# Patient Record
Sex: MALE | Race: WHITE | Employment: OTHER | URBAN - METROPOLITAN AREA
[De-identification: names, ages, dates, MRNs, and addresses within clinical notes are randomized per-mention and may not be internally consistent; named-entity substitution may affect disease eponyms.]

---

## 2017-02-01 ASSESSMENT — MIFFLIN-ST. JEOR: SCORE: 1941.06

## 2017-02-06 ENCOUNTER — SURGERY - HEALTHEAST (OUTPATIENT)
Dept: SURGERY | Facility: CLINIC | Age: 67
End: 2017-02-06

## 2017-02-06 ENCOUNTER — ANESTHESIA - HEALTHEAST (OUTPATIENT)
Dept: SURGERY | Facility: CLINIC | Age: 67
End: 2017-02-06

## 2017-02-06 ASSESSMENT — MIFFLIN-ST. JEOR: SCORE: 1984.15

## 2019-11-16 ENCOUNTER — HOSPITAL ENCOUNTER (EMERGENCY)
Facility: CLINIC | Age: 69
Discharge: HOME | End: 2019-11-16
Attending: FAMILY MEDICINE | Admitting: FAMILY MEDICINE
Payer: MEDICARE

## 2019-11-16 ENCOUNTER — APPOINTMENT (OUTPATIENT)
Dept: GENERAL RADIOLOGY | Facility: CLINIC | Age: 69
End: 2019-11-16
Attending: FAMILY MEDICINE
Payer: MEDICARE

## 2019-11-16 ENCOUNTER — APPOINTMENT (OUTPATIENT)
Dept: CT IMAGING | Facility: CLINIC | Age: 69
End: 2019-11-16
Attending: FAMILY MEDICINE
Payer: MEDICARE

## 2019-11-16 VITALS
RESPIRATION RATE: 24 BRPM | SYSTOLIC BLOOD PRESSURE: 160 MMHG | BODY MASS INDEX: 34.52 KG/M2 | DIASTOLIC BLOOD PRESSURE: 93 MMHG | HEIGHT: 74 IN | TEMPERATURE: 97.9 F | HEART RATE: 77 BPM | WEIGHT: 269 LBS | OXYGEN SATURATION: 92 %

## 2019-11-16 DIAGNOSIS — R06.09 DYSPNEA ON EXERTION: ICD-10-CM

## 2019-11-16 DIAGNOSIS — R79.89 ELEVATED BRAIN NATRIURETIC PEPTIDE (BNP) LEVEL: ICD-10-CM

## 2019-11-16 DIAGNOSIS — R93.89 ABNORMAL CT OF THE CHEST: ICD-10-CM

## 2019-11-16 PROBLEM — M54.16 LUMBAR RADICULOPATHY: Status: ACTIVE | Noted: 2017-02-06

## 2019-11-16 PROBLEM — Z98.890 S/P SPINAL SURGERY: Status: ACTIVE | Noted: 2019-11-16

## 2019-11-16 LAB
ALBUMIN SERPL-MCNC: 3.2 G/DL (ref 3.4–5)
ALP SERPL-CCNC: 122 U/L (ref 40–150)
ALT SERPL W P-5'-P-CCNC: 32 U/L (ref 0–70)
ANION GAP SERPL CALCULATED.3IONS-SCNC: 2 MMOL/L (ref 3–14)
AST SERPL W P-5'-P-CCNC: 22 U/L (ref 0–45)
BASOPHILS # BLD AUTO: 0 10E9/L (ref 0–0.2)
BASOPHILS NFR BLD AUTO: 0.3 %
BILIRUB DIRECT SERPL-MCNC: 0.2 MG/DL (ref 0–0.2)
BILIRUB SERPL-MCNC: 0.7 MG/DL (ref 0.2–1.3)
BUN SERPL-MCNC: 15 MG/DL (ref 7–30)
CALCIUM SERPL-MCNC: 8.3 MG/DL (ref 8.5–10.1)
CHLORIDE SERPL-SCNC: 108 MMOL/L (ref 94–109)
CO2 SERPL-SCNC: 32 MMOL/L (ref 20–32)
CREAT SERPL-MCNC: 0.8 MG/DL (ref 0.66–1.25)
D DIMER PPP FEU-MCNC: 0.6 UG/ML FEU (ref 0–0.5)
DIFFERENTIAL METHOD BLD: NORMAL
EOSINOPHIL # BLD AUTO: 0.1 10E9/L (ref 0–0.7)
EOSINOPHIL NFR BLD AUTO: 2.3 %
ERYTHROCYTE [DISTWIDTH] IN BLOOD BY AUTOMATED COUNT: 12.9 % (ref 10–15)
GFR SERPL CREATININE-BSD FRML MDRD: >90 ML/MIN/{1.73_M2}
GLUCOSE SERPL-MCNC: 132 MG/DL (ref 70–99)
HCT VFR BLD AUTO: 43.8 % (ref 40–53)
HGB BLD-MCNC: 14.2 G/DL (ref 13.3–17.7)
IMM GRANULOCYTES # BLD: 0 10E9/L (ref 0–0.4)
IMM GRANULOCYTES NFR BLD: 0.3 %
LYMPHOCYTES # BLD AUTO: 1.3 10E9/L (ref 0.8–5.3)
LYMPHOCYTES NFR BLD AUTO: 22.6 %
MCH RBC QN AUTO: 29.8 PG (ref 26.5–33)
MCHC RBC AUTO-ENTMCNC: 32.4 G/DL (ref 31.5–36.5)
MCV RBC AUTO: 92 FL (ref 78–100)
MONOCYTES # BLD AUTO: 0.4 10E9/L (ref 0–1.3)
MONOCYTES NFR BLD AUTO: 7 %
NEUTROPHILS # BLD AUTO: 3.9 10E9/L (ref 1.6–8.3)
NEUTROPHILS NFR BLD AUTO: 67.5 %
NRBC # BLD AUTO: 0 10*3/UL
NRBC BLD AUTO-RTO: 0 /100
NT-PROBNP SERPL-MCNC: 1490 PG/ML (ref 0–900)
PLATELET # BLD AUTO: 203 10E9/L (ref 150–450)
POTASSIUM SERPL-SCNC: 3.7 MMOL/L (ref 3.4–5.3)
PROT SERPL-MCNC: 6.5 G/DL (ref 6.8–8.8)
RBC # BLD AUTO: 4.76 10E12/L (ref 4.4–5.9)
SODIUM SERPL-SCNC: 142 MMOL/L (ref 133–144)
TROPONIN I SERPL-MCNC: 0.04 UG/L (ref 0–0.04)
TSH SERPL DL<=0.005 MIU/L-ACNC: 1.7 MU/L (ref 0.4–4)
WBC # BLD AUTO: 5.8 10E9/L (ref 4–11)

## 2019-11-16 PROCEDURE — 76604 US EXAM CHEST: CPT

## 2019-11-16 PROCEDURE — 71046 X-RAY EXAM CHEST 2 VIEWS: CPT

## 2019-11-16 PROCEDURE — 93005 ELECTROCARDIOGRAM TRACING: CPT

## 2019-11-16 PROCEDURE — 83880 ASSAY OF NATRIURETIC PEPTIDE: CPT | Performed by: FAMILY MEDICINE

## 2019-11-16 PROCEDURE — 85379 FIBRIN DEGRADATION QUANT: CPT | Performed by: FAMILY MEDICINE

## 2019-11-16 PROCEDURE — 99285 EMERGENCY DEPT VISIT HI MDM: CPT | Mod: 25

## 2019-11-16 PROCEDURE — 25000125 ZZHC RX 250: Performed by: FAMILY MEDICINE

## 2019-11-16 PROCEDURE — 80048 BASIC METABOLIC PNL TOTAL CA: CPT | Performed by: FAMILY MEDICINE

## 2019-11-16 PROCEDURE — 84443 ASSAY THYROID STIM HORMONE: CPT | Performed by: FAMILY MEDICINE

## 2019-11-16 PROCEDURE — 93010 ELECTROCARDIOGRAM REPORT: CPT | Mod: Z6 | Performed by: FAMILY MEDICINE

## 2019-11-16 PROCEDURE — 71275 CT ANGIOGRAPHY CHEST: CPT

## 2019-11-16 PROCEDURE — 25000128 H RX IP 250 OP 636: Performed by: FAMILY MEDICINE

## 2019-11-16 PROCEDURE — 99285 EMERGENCY DEPT VISIT HI MDM: CPT | Mod: 25 | Performed by: FAMILY MEDICINE

## 2019-11-16 PROCEDURE — 80076 HEPATIC FUNCTION PANEL: CPT | Performed by: FAMILY MEDICINE

## 2019-11-16 PROCEDURE — 84484 ASSAY OF TROPONIN QUANT: CPT | Performed by: FAMILY MEDICINE

## 2019-11-16 PROCEDURE — 85025 COMPLETE CBC W/AUTO DIFF WBC: CPT | Performed by: FAMILY MEDICINE

## 2019-11-16 RX ORDER — LOSARTAN POTASSIUM 50 MG/1
50 TABLET ORAL AT BEDTIME
COMMUNITY
End: 2023-07-21

## 2019-11-16 RX ORDER — ALCOHOL 70.47 ML/100ML
1 GEL TOPICAL DAILY
COMMUNITY
End: 2023-07-21

## 2019-11-16 RX ORDER — POTASSIUM CHLORIDE 750 MG/1
10 TABLET, EXTENDED RELEASE ORAL DAILY
Qty: 14 TABLET | Refills: 0 | Status: SHIPPED | OUTPATIENT
Start: 2019-11-16 | End: 2021-05-29

## 2019-11-16 RX ORDER — INSULIN GLARGINE 100 [IU]/ML
36 INJECTION, SOLUTION SUBCUTANEOUS 2 TIMES DAILY
COMMUNITY

## 2019-11-16 RX ORDER — IOPAMIDOL 755 MG/ML
100 INJECTION, SOLUTION INTRAVASCULAR ONCE
Status: COMPLETED | OUTPATIENT
Start: 2019-11-16 | End: 2019-11-16

## 2019-11-16 RX ORDER — HYDROCHLOROTHIAZIDE 25 MG/1
25 TABLET ORAL DAILY
COMMUNITY
End: 2021-09-16

## 2019-11-16 RX ORDER — FUROSEMIDE 20 MG
20 TABLET ORAL DAILY
Qty: 14 TABLET | Refills: 0 | Status: SHIPPED | OUTPATIENT
Start: 2019-11-16 | End: 2021-06-03

## 2019-11-16 RX ADMIN — SODIUM CHLORIDE 100 ML: 9 INJECTION, SOLUTION INTRAVENOUS at 12:10

## 2019-11-16 RX ADMIN — IOPAMIDOL 100 ML: 755 INJECTION, SOLUTION INTRAVENOUS at 12:10

## 2019-11-16 ASSESSMENT — ENCOUNTER SYMPTOMS
COUGH: 0
CHILLS: 0
DYSURIA: 0
PALPITATIONS: 0
DIARRHEA: 0
BLOOD IN STOOL: 0
DIAPHORESIS: 0
CONSTIPATION: 0
WHEEZING: 0
ABDOMINAL PAIN: 0
HEADACHES: 0
VOMITING: 0
NAUSEA: 0
UNEXPECTED WEIGHT CHANGE: 1
SORE THROAT: 0
FEVER: 0
SHORTNESS OF BREATH: 1
SINUS PRESSURE: 0
FREQUENCY: 0

## 2019-11-16 ASSESSMENT — MIFFLIN-ST. JEOR: SCORE: 2059.93
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
## 2019-11-16 NOTE — ED AVS SNAPSHOT
[facility]  [address] [address]  Phone:  [phone]  Fax:  [phone]                                    Reilly Borja   MRN: [id number]    Department:  [facility]   Date of Visit:  11/16/2019           After Visit Summary Signature Page    I have received my discharge instructions, and my questions have been answered. I have discussed any challenges I see with this plan with the nurse or doctor.    ..........................................................................................................................................  Patient/Patient Representative Signature      ..........................................................................................................................................  Patient Representative Print Name and Relationship to Patient    ..................................................               ................................................  Date                                   Time    ..........................................................................................................................................  Reviewed by Signature/Title    ...................................................              ..............................................  Date                                               Time          22EPIC Rev 08/18

## 2021-05-25 ENCOUNTER — RECORDS - HEALTHEAST (OUTPATIENT)
Dept: ADMINISTRATIVE | Facility: CLINIC | Age: 71
End: 2021-05-25

## 2021-05-28 ENCOUNTER — HOSPITAL ENCOUNTER (EMERGENCY)
Facility: CLINIC | Age: 71
Discharge: HOME | End: 2021-05-29
Attending: EMERGENCY MEDICINE | Admitting: EMERGENCY MEDICINE
Payer: MEDICARE

## 2021-05-28 ENCOUNTER — COMMUNICATION - HEALTHEAST (OUTPATIENT)
Dept: SCHEDULING | Facility: CLINIC | Age: 71
End: 2021-05-28

## 2021-05-28 DIAGNOSIS — I50.9 ACUTE ON CHRONIC CONGESTIVE HEART FAILURE, UNSPECIFIED HEART FAILURE TYPE (H): ICD-10-CM

## 2021-05-28 LAB
BASOPHILS # BLD AUTO: 0 10E9/L (ref 0–0.2)
BASOPHILS NFR BLD AUTO: 0.3 %
DIFFERENTIAL METHOD BLD: ABNORMAL
EOSINOPHIL # BLD AUTO: 0.2 10E9/L (ref 0–0.7)
EOSINOPHIL NFR BLD AUTO: 2.5 %
ERYTHROCYTE [DISTWIDTH] IN BLOOD BY AUTOMATED COUNT: 13.2 % (ref 10–15)
HCT VFR BLD AUTO: 35.7 % (ref 40–53)
HGB BLD-MCNC: 11.5 G/DL (ref 13.3–17.7)
IMM GRANULOCYTES # BLD: 0.1 10E9/L (ref 0–0.4)
IMM GRANULOCYTES NFR BLD: 1 %
LYMPHOCYTES # BLD AUTO: 1.5 10E9/L (ref 0.8–5.3)
LYMPHOCYTES NFR BLD AUTO: 20.4 %
MCH RBC QN AUTO: 29 PG (ref 26.5–33)
MCHC RBC AUTO-ENTMCNC: 32.2 G/DL (ref 31.5–36.5)
MCV RBC AUTO: 90 FL (ref 78–100)
MONOCYTES # BLD AUTO: 0.6 10E9/L (ref 0–1.3)
MONOCYTES NFR BLD AUTO: 7.9 %
NEUTROPHILS # BLD AUTO: 4.9 10E9/L (ref 1.6–8.3)
NEUTROPHILS NFR BLD AUTO: 67.9 %
NRBC # BLD AUTO: 0 10*3/UL
NRBC BLD AUTO-RTO: 0 /100
PLATELET # BLD AUTO: 360 10E9/L (ref 150–450)
RBC # BLD AUTO: 3.96 10E12/L (ref 4.4–5.9)
WBC # BLD AUTO: 7.2 10E9/L (ref 4–11)

## 2021-05-28 PROCEDURE — 96374 THER/PROPH/DIAG INJ IV PUSH: CPT | Mod: 59 | Performed by: EMERGENCY MEDICINE

## 2021-05-28 PROCEDURE — 84484 ASSAY OF TROPONIN QUANT: CPT | Performed by: EMERGENCY MEDICINE

## 2021-05-28 PROCEDURE — 83880 ASSAY OF NATRIURETIC PEPTIDE: CPT | Performed by: EMERGENCY MEDICINE

## 2021-05-28 PROCEDURE — 80053 COMPREHEN METABOLIC PANEL: CPT | Performed by: EMERGENCY MEDICINE

## 2021-05-28 PROCEDURE — 99285 EMERGENCY DEPT VISIT HI MDM: CPT | Mod: 25 | Performed by: EMERGENCY MEDICINE

## 2021-05-28 PROCEDURE — 93010 ELECTROCARDIOGRAM REPORT: CPT | Performed by: EMERGENCY MEDICINE

## 2021-05-28 PROCEDURE — 85025 COMPLETE CBC W/AUTO DIFF WBC: CPT | Performed by: EMERGENCY MEDICINE

## 2021-05-28 PROCEDURE — 93005 ELECTROCARDIOGRAM TRACING: CPT | Performed by: EMERGENCY MEDICINE

## 2021-05-28 PROCEDURE — 85379 FIBRIN DEGRADATION QUANT: CPT | Performed by: EMERGENCY MEDICINE

## 2021-05-28 RX ORDER — FUROSEMIDE 10 MG/ML
80 INJECTION INTRAMUSCULAR; INTRAVENOUS ONCE
Status: COMPLETED | OUTPATIENT
Start: 2021-05-28 | End: 2021-05-29

## 2021-05-28 ASSESSMENT — MIFFLIN-ST. JEOR: SCORE: 2031.78

## 2021-05-29 ENCOUNTER — APPOINTMENT (OUTPATIENT)
Dept: CT IMAGING | Facility: CLINIC | Age: 71
End: 2021-05-29
Attending: EMERGENCY MEDICINE
Payer: MEDICARE

## 2021-05-29 VITALS
RESPIRATION RATE: 18 BRPM | BODY MASS INDEX: 34.01 KG/M2 | HEART RATE: 99 BPM | SYSTOLIC BLOOD PRESSURE: 166 MMHG | WEIGHT: 265 LBS | OXYGEN SATURATION: 97 % | TEMPERATURE: 98.6 F | DIASTOLIC BLOOD PRESSURE: 89 MMHG | HEIGHT: 74 IN

## 2021-05-29 LAB
ALBUMIN SERPL-MCNC: 2.8 G/DL (ref 3.4–5)
ALP SERPL-CCNC: 562 U/L (ref 40–150)
ALT SERPL W P-5'-P-CCNC: 33 U/L (ref 0–70)
ANION GAP SERPL CALCULATED.3IONS-SCNC: 4 MMOL/L (ref 3–14)
AST SERPL W P-5'-P-CCNC: 21 U/L (ref 0–45)
BILIRUB SERPL-MCNC: 0.6 MG/DL (ref 0.2–1.3)
BUN SERPL-MCNC: 20 MG/DL (ref 7–30)
CALCIUM SERPL-MCNC: 8.7 MG/DL (ref 8.5–10.1)
CHLORIDE SERPL-SCNC: 100 MMOL/L (ref 94–109)
CO2 SERPL-SCNC: 32 MMOL/L (ref 20–32)
CREAT SERPL-MCNC: 0.86 MG/DL (ref 0.66–1.25)
D DIMER PPP FEU-MCNC: 3.3 UG/ML FEU (ref 0–0.5)
GFR SERPL CREATININE-BSD FRML MDRD: 88 ML/MIN/{1.73_M2}
GLUCOSE SERPL-MCNC: 238 MG/DL (ref 70–99)
NT-PROBNP SERPL-MCNC: 5762 PG/ML (ref 0–900)
POTASSIUM SERPL-SCNC: 3.8 MMOL/L (ref 3.4–5.3)
PROT SERPL-MCNC: 7.8 G/DL (ref 6.8–8.8)
SODIUM SERPL-SCNC: 136 MMOL/L (ref 133–144)
TROPONIN I SERPL-MCNC: 0.02 UG/L (ref 0–0.04)

## 2021-05-29 PROCEDURE — 71275 CT ANGIOGRAPHY CHEST: CPT | Mod: ME

## 2021-05-29 PROCEDURE — 250N000009 HC RX 250: Performed by: EMERGENCY MEDICINE

## 2021-05-29 PROCEDURE — 250N000011 HC RX IP 250 OP 636: Performed by: EMERGENCY MEDICINE

## 2021-05-29 RX ORDER — IOPAMIDOL 755 MG/ML
93 INJECTION, SOLUTION INTRAVASCULAR ONCE
Status: COMPLETED | OUTPATIENT
Start: 2021-05-29 | End: 2021-05-29

## 2021-05-29 RX ORDER — POTASSIUM CHLORIDE 750 MG/1
10 TABLET, EXTENDED RELEASE ORAL 2 TIMES DAILY
Qty: 14 TABLET | Refills: 0 | Status: SHIPPED | OUTPATIENT
Start: 2021-05-29 | End: 2021-09-16

## 2021-05-29 RX ADMIN — IOPAMIDOL 93 ML: 755 INJECTION, SOLUTION INTRAVENOUS at 01:31

## 2021-05-29 RX ADMIN — FUROSEMIDE 80 MG: 10 INJECTION, SOLUTION INTRAMUSCULAR; INTRAVENOUS at 01:48

## 2021-05-29 RX ADMIN — SODIUM CHLORIDE 100 ML: 9 INJECTION, SOLUTION INTRAVENOUS at 01:31

## 2021-05-29 ASSESSMENT — ENCOUNTER SYMPTOMS
VOMITING: 0
RHINORRHEA: 0
DYSPHORIC MOOD: 0
FATIGUE: 0
PALPITATIONS: 0
APPETITE CHANGE: 0
FEVER: 0
CHEST TIGHTNESS: 0
ABDOMINAL PAIN: 0
WEAKNESS: 0
LIGHT-HEADEDNESS: 0
NUMBNESS: 0
ACTIVITY CHANGE: 1
SHORTNESS OF BREATH: 1
BACK PAIN: 0
NAUSEA: 0
HEADACHES: 0
COUGH: 1

## 2021-05-29 NOTE — ED TRIAGE NOTES
Has hx of CHF. Last 4 days unable to lay down d/t unable to catch his breath. Having trouble now sleep upright in a chair. States he thinks he gained about 15 lbs of water weight. o2 sat goes down to 91% at night.

## 2021-05-29 NOTE — ED PROVIDER NOTES
History     Chief Complaint   Patient presents with     Shortness of Breath     HPI  Reilly Borja is a 71 year old male with a history of CHF, hypertension, and diabetes presenting for evaluation of gradually progressive dyspnea.  Patient ports worsening dyspnea especially at night over the past 4 to 5 days.  He does report difficulty with exertion as well but mostly notices it when trying to sleep.  He reports often waking up with difficulty breathing leading to poor sleep over the past 4 days.  He reports that he has noticed increased weight of about 15 pounds over the past week or so.  Has measured his oxygen levels dropping down to around 90% when sleeping and normally runs around 95% when sleeping.  Denies any chest pains.  Reports an occasional cough.  No fevers or chills.  Denies lightheadedness or dizziness.  Does report some increased swelling in his legs.  Had recent surgery on his left foot and remains in a boot and has had limited mobility for the past several weeks due to his surgery.  Has had bilateral lower leg swelling more than the left where his surgery occurred.  No history of blood clots.    Allergies:  No Known Allergies    Problem List:    Patient Active Problem List    Diagnosis Date Noted     Essential hypertension 11/16/2019     Priority: Medium     Type 2 diabetes mellitus with complication, with long-term current use of insulin (H) 11/16/2019     Priority: Medium     S/P spinal surgery 11/16/2019     Priority: Medium     Lumbar radiculopathy 02/06/2017     Priority: Medium        Past Medical History:    No past medical history on file.    Past Surgical History:    No past surgical history on file.    Family History:    No family history on file.    Social History:  Marital Status:  Single [1]  Social History     Tobacco Use     Smoking status: Not on file   Substance Use Topics     Alcohol use: Not on file     Drug use: Not on file        Medications:    potassium chloride ER  "(KLOR-CON M) 10 MEQ CR tablet  furosemide (LASIX) 20 MG tablet  hydrochlorothiazide (HYDRODIURIL) 25 MG tablet  insulin glargine (LANTUS VIAL) 100 UNIT/ML vial  losartan (COZAAR) 50 MG tablet  metFORMIN (GLUCOPHAGE) 1000 MG tablet  multivitamin (THERMEMS) TABS          Review of Systems   Constitutional: Positive for activity change (decreased due to left foot surgery about 10 wk ago). Negative for appetite change, fatigue and fever.   HENT: Negative for congestion and rhinorrhea.    Respiratory: Positive for cough (occasional) and shortness of breath. Negative for chest tightness.    Cardiovascular: Positive for leg swelling. Negative for chest pain and palpitations.   Gastrointestinal: Negative for abdominal pain, nausea and vomiting.   Genitourinary: Negative for decreased urine volume.   Musculoskeletal: Negative for back pain.   Skin: Negative for rash.   Neurological: Negative for weakness, light-headedness, numbness and headaches.   Psychiatric/Behavioral: Negative for dysphoric mood.   All other systems reviewed and are negative.      Physical Exam   BP: (!) 185/97  Pulse: 94  Temp: 97.1  F (36.2  C)  Resp: 20  Height: 188 cm (6' 2\")  Weight: 120.2 kg (265 lb)  SpO2: 94 %      Physical Exam  Vitals signs and nursing note reviewed.   Constitutional:       Appearance: He is obese. He is not ill-appearing or diaphoretic.      Comments: Sitting upright resting comfortably in no respiratory distress.  Able to speak in full sentences   HENT:      Head: Atraumatic.      Nose: Nose normal.      Mouth/Throat:      Mouth: Mucous membranes are moist.   Eyes:      Conjunctiva/sclera: Conjunctivae normal.   Neck:      Musculoskeletal: Normal range of motion.   Cardiovascular:      Rate and Rhythm: Normal rate.      Pulses: Normal pulses.   Pulmonary:      Effort: Pulmonary effort is normal.      Comments: Mild scattered wheeze  Abdominal:      Palpations: Abdomen is soft.      Tenderness: There is no abdominal " tenderness.      Comments: Protuberant   Musculoskeletal:      Right lower leg: Edema present.      Left lower leg: Edema present.      Comments: Bilateral lower extremity edema, left greater than right   Skin:     General: Skin is warm and dry.      Capillary Refill: Capillary refill takes less than 2 seconds.   Neurological:      Mental Status: He is alert and oriented to person, place, and time.   Psychiatric:         Mood and Affect: Mood normal.         ED Course        Procedures               EKG Interpretation:      Interpreted by Reynaldo Parker MD  Time reviewed: 2357  Symptoms at time of EKG: dyspnea   Rhythm: normal sinus   Rate: normal  Axis: leftward axis  Ectopy: none  Conduction: low voltage limb leads  ST Segments/ T Waves: No ST-T wave changes  Q Waves: none  Comparison to prior: EKG grossly similar to previous EKG 11/16/2019    Clinical Impression: Sinus rhythm without acute ischemic abnormality, not significantly changed from previous EKG in 2019                   Results for orders placed or performed during the hospital encounter of 05/28/21 (from the past 24 hour(s))   CBC with platelets, differential   Result Value Ref Range    WBC 7.2 4.0 - 11.0 10e9/L    RBC Count 3.96 (L) 4.4 - 5.9 10e12/L    Hemoglobin 11.5 (L) 13.3 - 17.7 g/dL    Hematocrit 35.7 (L) 40.0 - 53.0 %    MCV 90 78 - 100 fl    MCH 29.0 26.5 - 33.0 pg    MCHC 32.2 31.5 - 36.5 g/dL    RDW 13.2 10.0 - 15.0 %    Platelet Count 360 150 - 450 10e9/L    Diff Method Automated Method     % Neutrophils 67.9 %    % Lymphocytes 20.4 %    % Monocytes 7.9 %    % Eosinophils 2.5 %    % Basophils 0.3 %    % Immature Granulocytes 1.0 %    Nucleated RBCs 0 0 /100    Absolute Neutrophil 4.9 1.6 - 8.3 10e9/L    Absolute Lymphocytes 1.5 0.8 - 5.3 10e9/L    Absolute Monocytes 0.6 0.0 - 1.3 10e9/L    Absolute Eosinophils 0.2 0.0 - 0.7 10e9/L    Absolute Basophils 0.0 0.0 - 0.2 10e9/L    Abs Immature Granulocytes 0.1 0 - 0.4 10e9/L     Absolute Nucleated RBC 0.0    Comprehensive metabolic panel   Result Value Ref Range    Sodium 136 133 - 144 mmol/L    Potassium 3.8 3.4 - 5.3 mmol/L    Chloride 100 94 - 109 mmol/L    Carbon Dioxide 32 20 - 32 mmol/L    Anion Gap 4 3 - 14 mmol/L    Glucose 238 (H) 70 - 99 mg/dL    Urea Nitrogen 20 7 - 30 mg/dL    Creatinine 0.86 0.66 - 1.25 mg/dL    GFR Estimate 88 >60 mL/min/[1.73_m2]    GFR Estimate If Black >90 >60 mL/min/[1.73_m2]    Calcium 8.7 8.5 - 10.1 mg/dL    Bilirubin Total 0.6 0.2 - 1.3 mg/dL    Albumin 2.8 (L) 3.4 - 5.0 g/dL    Protein Total 7.8 6.8 - 8.8 g/dL    Alkaline Phosphatase 562 (H) 40 - 150 U/L    ALT 33 0 - 70 U/L    AST 21 0 - 45 U/L   Troponin I   Result Value Ref Range    Troponin I ES 0.017 0.000 - 0.045 ug/L   Nt probnp inpatient (BNP)   Result Value Ref Range    N-Terminal Pro BNP Inpatient 5,762 (H) 0 - 900 pg/mL   D dimer quantitative   Result Value Ref Range    D Dimer 3.3 (H) 0.0 - 0.50 ug/ml FEU   CT Chest Pulmonary Embolism w Contrast    Narrative    EXAM: CT CHEST PULMONARY EMBOLISM W CONTRAST  LOCATION: [facility]  DATE/TIME: 5/29/2021 1:30 AM    INDICATION: PE suspected, low/intermediate prob, positive D-dimer; CHF, hypertension, diabetes, progressive dyspnea.  COMPARISON: 11/16/2019  TECHNIQUE: CT chest pulmonary angiogram during arterial phase injection of IV contrast. Multiplanar reformats and MIP reconstructions were performed. Dose reduction techniques were used.   CONTRAST: 93 ml Isovue 370    FINDINGS:  ANGIOGRAM CHEST: Main pulmonary arteries are mildly dilated compatible with pulmonary arterial hypertension. No evidence of pulmonary embolus. Thoracic aorta is not well opacified and is  indeterminate for dissection. No CT evidence of right heart   strain.    LUNGS AND PLEURA: Small right pleural effusion and small to moderate left pleural effusion, not significantly changed. Interlobular septal thickening slightly increased in the interval compatible with  "edema. Mild patchy right lower lobe predominant   groundglass opacities also consistent with edema. Patent central airways.    MEDIASTINUM/AXILLAE: Numerous mediastinal and hilar nodes, measuring less than a centimeter in size, likely reactive. Small pericardial effusion.    CORONARY ARTERY CALCIFICATION: Moderate.    UPPER ABDOMEN: Unremarkable    MUSCULOSKELETAL: Multiple old rib fractures. Multilevel spondylosis.      Impression    IMPRESSION:  1.  No evidence pulmonary embolus.  2.  Findings compatible with mild pulmonary edema with bilateral pleural effusions, left greater than right..       Medications   furosemide (LASIX) injection 80 mg (80 mg Intravenous Given 5/29/21 0148)   iopamidol (ISOVUE-370) solution 93 mL (93 mLs Intravenous Given 5/29/21 0131)   sodium chloride 0.9 % bag 500mL for CT scan flush use (100 mLs As instructed Given 5/29/21 0131)        Patient Vitals for the past 24 hrs:   BP Temp Temp src Pulse Resp SpO2 Height Weight   05/29/21 0308 -- -- -- -- -- 97 % -- --   05/29/21 0306 (!) 166/89 -- -- 99 -- -- -- --   05/29/21 0145 -- -- -- -- -- 95 % -- --   05/28/21 2315 (!) 167/100 98.6  F (37  C) Temporal 95 18 96 % -- --   05/28/21 2225 (!) 185/97 97.1  F (36.2  C) Temporal 94 20 94 % 1.88 m (6' 2\") 120.2 kg (265 lb)       11:56 PM: Wells score for PE low risk (only risk is of recent surgery). Dimer added on as he cannot rule out by PERC.    12:36 AM: Dimer elevated at 3.3. CT PE ordered.    2:46 AM Patient re-assessed: Patient is resting relatively comfortably.  Has been up to urinate several times a large quantity.  Still is having difficulty sleeping due to waking up with some dyspnea.  When awake and talking, oxygen saturations remained in the mid 90s.  Will trial ambulatory pulse ox to see if he desaturates at all.    3:16 AM Patient re-assessed: Patient resting comfortably.  He ambulated well with pulse oximetry running up to 97%.  Given his ability to maintain oxygenation, and his " now diuresing, he is likely safe to go home.  Patient amenable to this plan.  Advised that if symptoms worsen, to return.  Also advised to be sure to take calcium supplementation and follow-up closely for repeat labs and assessment.  Return precautions given by myself.        Assessments & Plan (with Medical Decision Making)  71-year-old male with CHF, hypertension, diabetes presenting for evaluation of progressive dyspnea over the past several days.  Symptoms most prominent at night leading to very poor sleep due to waking up frequently with dyspnea.  Has put on additional weight over the last week and has had decreased urination.  [facility] and they increased his Lasix from 40 to 80 mg in the morning but patient has not yet had much increased urine output.  Tonight was having difficulty sleeping so came in for evaluation.  No fevers.  Has had a mild cough.  Not hypoxic at home but oxygen running notably lower in the low 90s compared to baseline in the mid 90s.  Patient in no distress in the ED.  Did have recent foot surgery and has lower extremity edema more prominent on the left than the right suggesting possible DVT.  D-dimer elevated so CT PE done.  EKG nonischemic and unchanged from baseline.  Troponin negative.  BNP notably elevated.  CT showed no evidence of pulmonary embolus but did show pulmonary edema with some bilateral pleural effusions.  Patient was given 80 mg of IV Lasix with significant positive urine output.  Patient did have some symptomatic improvement with this.  Ambulatory pulse oximetry encouragingly in the upper 90s and resting pulse oximetry in the mid 90s.  Patient still anxious about going home due to his difficulty sleeping however given the lack of hypoxia or increased work of breathing, he is deemed medically safe for discharge.  Encouraged him to continue sitting upright in a recliner tonight and continue the increased dose of Lasix at home.  Patient  would like to schedule primary care at our location here for follow-up as it is very inconvenient to [facility] to [facility].  Primary care follow-up was scheduled later this week for recheck.  Will likely need a potassium rechecked due to the increased Lasix.  Potassium was increased from 10 mEq daily to 10 mEq twice daily for the next week.  Return precautions given if he develops chest pain or worsening dyspnea despite treatment or if he has any other concerns that symptoms may be worsening.     I have reviewed the nursing notes.    I have reviewed the findings, diagnosis, plan and need for follow up with the patient.       Current Discharge Medication List          Final diagnoses:   Acute on chronic congestive heart failure, unspecified heart failure type (H)       5/28/2021   [facility] EMERGENCY DEPT     Parker, Reynaldo Messina MD  05/29/21 1064

## 2021-05-30 VITALS — BODY MASS INDEX: 32.66 KG/M2 | HEIGHT: 74 IN | WEIGHT: 254.5 LBS

## 2021-06-03 ENCOUNTER — OFFICE VISIT (OUTPATIENT)
Dept: FAMILY MEDICINE | Facility: CLINIC | Age: 71
End: 2021-06-03
Payer: MEDICARE

## 2021-06-03 VITALS
BODY MASS INDEX: 34.01 KG/M2 | SYSTOLIC BLOOD PRESSURE: 128 MMHG | TEMPERATURE: 98.6 F | HEIGHT: 74 IN | OXYGEN SATURATION: 94 % | WEIGHT: 265 LBS | RESPIRATION RATE: 16 BRPM | DIASTOLIC BLOOD PRESSURE: 70 MMHG | HEART RATE: 80 BPM

## 2021-06-03 DIAGNOSIS — I50.9 CHRONIC HEART FAILURE, UNSPECIFIED HEART FAILURE TYPE (H): Primary | ICD-10-CM

## 2021-06-03 LAB
ANION GAP SERPL CALCULATED.3IONS-SCNC: 6 MMOL/L (ref 3–14)
BUN SERPL-MCNC: 14 MG/DL (ref 7–30)
CALCIUM SERPL-MCNC: 8.6 MG/DL (ref 8.5–10.1)
CHLORIDE SERPL-SCNC: 96 MMOL/L (ref 94–109)
CO2 SERPL-SCNC: 33 MMOL/L (ref 20–32)
CREAT SERPL-MCNC: 0.9 MG/DL (ref 0.66–1.25)
GFR SERPL CREATININE-BSD FRML MDRD: 86 ML/MIN/{1.73_M2}
GLUCOSE SERPL-MCNC: 199 MG/DL (ref 70–99)
POTASSIUM SERPL-SCNC: 4.3 MMOL/L (ref 3.4–5.3)
SODIUM SERPL-SCNC: 135 MMOL/L (ref 133–144)

## 2021-06-03 PROCEDURE — 99204 OFFICE O/P NEW MOD 45 MIN: CPT | Performed by: FAMILY MEDICINE

## 2021-06-03 PROCEDURE — 80048 BASIC METABOLIC PNL TOTAL CA: CPT | Performed by: FAMILY MEDICINE

## 2021-06-03 PROCEDURE — 36415 COLL VENOUS BLD VENIPUNCTURE: CPT | Performed by: FAMILY MEDICINE

## 2021-06-03 RX ORDER — METOPROLOL SUCCINATE 100 MG/1
TABLET, EXTENDED RELEASE ORAL
COMMUNITY
Start: 2020-12-30 | End: 2021-09-16

## 2021-06-03 RX ORDER — GABAPENTIN 300 MG/1
CAPSULE ORAL
Status: ON HOLD | COMMUNITY
Start: 2021-05-21 | End: 2021-09-17

## 2021-06-03 RX ORDER — COLCHICINE 0.6 MG/1
TABLET ORAL
COMMUNITY
Start: 2020-09-24 | End: 2021-09-16

## 2021-06-03 RX ORDER — INDOMETHACIN 50 MG/1
CAPSULE ORAL
COMMUNITY
Start: 2021-05-21 | End: 2021-09-16

## 2021-06-03 RX ORDER — MULTIVITAMIN,THERAPEUTIC
1 TABLET ORAL
COMMUNITY
End: 2021-09-16

## 2021-06-03 RX ORDER — FERROUS SULFATE 325(65) MG
TABLET ORAL
COMMUNITY
Start: 2021-06-02 | End: 2021-09-16

## 2021-06-03 RX ORDER — FUROSEMIDE 20 MG
20 TABLET ORAL 2 TIMES DAILY
Qty: 14 TABLET | Refills: 0
Start: 2021-06-03 | End: 2022-09-22

## 2021-06-03 RX ORDER — ATORVASTATIN CALCIUM 80 MG/1
40 TABLET, FILM COATED ORAL DAILY
COMMUNITY
Start: 2021-02-26 | End: 2022-09-22

## 2021-06-03 ASSESSMENT — MIFFLIN-ST. JEOR: SCORE: 2031.78

## 2021-06-03 NOTE — PROGRESS NOTES
Assessment & Plan     Chronic heart failure, unspecified heart failure type (H)  Patient appears to be clinically well-compensated at this time.  Reviewed his med list. No change is made - patient is already at 60 mg of furosemide a day, and actually lost one lb between yesterday and today, he said. [facility] who kept him on the 60 mg daily of furosemide. Reviewed the encounter entry from yesterday but no visit notes are visible excet for his diagnoses and lab results.  He still does have bipedal edema, but he may have swelling in the left lwoer extremity also secondary to his recent surgery.  Recheck BMP today. If stable renal function, consider adding another 20 mg of furosemide at night for just the next 2-3 days.  Patient is unsure [facility]. Offered referral to local cardiology clinic - patient concurs to establish care with one here.  Reinforced sodium restriction.  Patient said his symptoms of breathing issue when supine (present even at the ER visit) resolves quickly with getting up and denies actual HUNT. Advised patient to continue to sleep on an incline for comfort.  All meds to continue.  Reasons to go to ER discussed in detail with patient. He states he is very well versed about ER and return precautions.  - furosemide (LASIX) 20 MG tablet    - Basic metabolic panel  - CARDIOLOGY EVAL ADULT REFERRAL        Review of prior external note(s) from [facility]  Review of the result(s) of each unique test - CMP, CT chest, CBC  33 minutes spent on the date of the encounter doing chart review, review of outside records, review of test results, interpretation of tests, patient visit and documentation        Patient Instructions   You will be contacted in 24 hours for results of your lab tests.    May consider adding another 20 mg of furosemide at night for total of 40 mg in the morning and at night for the next few days if  renal function is stable.       Patient Education     Heart Failure  What is heart failure?  The heart is a muscle that pumps oxygen-rich blood to all parts of the body. When you have heart failure, the heart can t pump as well as it should. Or the heart muscle can t relax and fill the pumping chamber with blood. Blood and fluid may back up into the lungs. This causes congestive heart failure. And it causes pulmonary edema. Some parts of the body also don t get enough oxygen-rich blood. This means they can't work well. These problems lead to the symptoms of heart failure.  What causes heart failure?  Heart failure may result from:    Heart valve disease    High blood pressure    Active infections of the heart valves or heart muscle, such as endocarditis    A past heart attack    Coronary artery disease    Disease of the heart muscle (cardiomyopathy)    Heart disease or problems that are present at birth (congenital)    Heart rhythm problems (arrhythmias) that lead to ongoing fast heart rates    Long-term (chronic) lung disease and pulmonary embolism    Some medicines    A reaction to medicines such as those used for chemotherapy    Anemia and too much blood loss    Complications of diabetes    Obstructive sleep apnea    Alcohol and drug abuse    Certain viral infections  What are the symptoms of heart failure?  The most common symptoms of heart failure are:    Shortness of breath while resting, exercising, or lying flat    Weight gain from water retention    Visible swelling of the legs and ankles from fluid buildup. Sometimes the belly (abdomen) may swell.    Severe tiredness (fatigue) and weakness    Loss of appetite, nausea, and belly pain    Cough that doesn t go away. It can cause blood-tinged or frothy sputum.  The severity of the condition and symptoms depends on how much of the heart's pumping ability has been affected. The first step in managing heart failure symptoms is knowing your baselines or what s  normal for you. How much do you weigh? Are you gaining weight but eating the same amount? How much can you do before you feel short of breath? Do your socks and shoes fit comfortably? Knowing what s normal for you will help you see when symptoms are getting worse. Once you know your baselines, watch for changes daily.   The symptoms of heart failure may look like other health problems. Always see your healthcare provider for a diagnosis.  How is heart failure diagnosed?  Your healthcare provider will ask about your health history. He or she will give you a physical exam. You may need tests such as:    Chest X-ray. This test makes images of internal tissues, bones, and organs on film. This test shows the size and shape of your heart. Fluid in the lungs will also show up on X-ray.    Echocardiogram. This test is also called echo. It uses sound waves to assess the motion of the heart s chambers and valves. The sound waves make an image on the screen as an ultrasound transducer is passed over the heart. This shows how well the heart pumps and relaxes. It also shows the thickness of the heart walls, and if the heart is enlarged. It is one of the most useful tests because it shows a lot of information about the heart s function. And it helps guide treatment choices.    Electrocardiogram (ECG). This test records the electrical activity of the heart. It shows abnormal rhythms. It can sometimes find heart muscle damage.    BNP testing. B-type natriuretic peptide (BNP) is a hormone released from the ventricles that occurs with heart failure. BNP levels are useful in the quick assessment of heart failure. The higher the BNP levels, the worse the heart failure. BNP is measured from a blood sample.    Cardiac MRI. This test uses a magnetic field to make images of the heart and its nearby tissues. It can assess how the heart muscle and valves are working.  How is heart failure treated?   The cause of heart failure will guide the  treatment plan. If heart failure is caused by a valve problem or coronary heart disease, then you may need a procedure. This may be a percutaneous coronary intervention. Or it may be surgery. If heart failure is caused by a problem such as anemia or an infection, you may need medicine to treat this problem.   Some causes of heart failure are reversible or short-term, such as in an acute infection. For many causes of heart failure there is no cure. But many forms of treatment can help with symptoms. They are listed below.  Lifestyle changes  These healthy habits may help with heart failure:    Controlling blood pressure    Controlling blood sugar if you have diabetes    Quitting smoking    Losing weight, if needed    Exercising more    Limiting salt and fat in your diet    Not drinking alcohol or using illicit drugs    Getting enough rest    Other important lifestyle habits include getting vaccines such as for the flu and pneumococcal pneumonia.  If you have sleep problems, getting a sleep study get help find out what s causing them. You may need to wear a C-PAP mask while you sleep. This will make sure you get enough oxygen. Too little oxygen can put stress on your heart.   Medicines  Many types of medicines are available for heart failure. They include:    Angiotensin converting enzyme (ACE) inhibitors. These lower the pressure inside the blood vessels. This reduces the pressure that the heart has to pump against. They can also help the heart have better pumping ability over time.    Angiotensin receptor blockers (ARB). Some people get a cough and need to stop taking ACE inhibitors. If that happens, an ARB may work for you. These help relax blood vessels and reduce stress on the heart.    Angiotensin receptor-neprilysin inhibitors (ARNIs). This medicine combines an ARB and a neprilysin inhibitor. This can help the heart as noted above. And it can promote salt and water loss.    Sinus node I-f channel blocker. This  may be used to lower your heart rate. Then then puts less stress on your heart.    Diuretics. These reduce the amount of fluid in the body. They are among the most important medicines in helping control fluid buildup in the body.    Vasodilators. These include hydralazine and nitroglycerin. These widen (dilate) the blood vessels. They reduce the workload on the heart.    Digitalis. This medicine helps the heart beat stronger. It may help with controlling heart rate if there is an abnormal heart rhythm.    Antiarrhythmics. These help keep normal heart rhythm.    Beta-blockers. These reduce the heart s tendency to beat faster. They can also help the heart pump better over time.    Aldosterone blockers. This blocks the effects of the hormone aldosterone. This hormone causes sodium and water retention.    Statins or PCKS9 inhibitors. These lower the amount of bad cholesterol in your blood. They are not used to treat heart failure. But you may take one if you have high cholesterol. Or you may take one if you have had a past heart attack and are at risk for heart failure. People who have inherited forms of high cholesterol (familial hypercholesterolemia) may get help from PCKS9 inhibitors. These medicines lower cholesterol.  Heart procedures  These include opening blocked arteries in the heart. This brings back blood flow to the heart muscle. It helps the ventricles squeeze as they should. The procedure can be done in the cardiac catheterization lab. It uses balloons to push plaque and blood clots out of the artery. It also uses stents to keep the artery open. This can also be done by bypassing blockages during surgery (coronary artery bypass surgery).  Heart valve repair or replacement  In some cases medicines can t help heart failure caused by heart valves that are narrowed (stenosed) or leak (regurgitant). The heart valve can be repaired or replaced. This can be done as an open-heart procedure. Or it can be done by  going through a small tube (catheter) that is put into an artery or vein.  Pacemaker  If your heart failure has also damaged your heart s electrical wiring system, a pacemaker can be implanted. This is done to restore normal heart rate and regularity. A cardiac resynchronizing pacemaker is used when 1 of the heart wires is damaged. This is often the wire located in the left ventricle. These pacemakers use implanted left and right sided wires to restore normal timing of the heart contraction in order to improve heart function.   ICD (implantable cardioverter defibrillator)  When heart muscle is damaged, dangerous heart circuits can form in the heart muscle. This leads to heart rhythms that can cause death. An ICD is implanted in the body to sense and treat these cardiac arrest rhythms. It does this by overdrive pacing the heart rhythm. Or it sends an energy shock to the heart.   VAD (ventricular assist devices)  This device is put in the chest during a surgery. It connects to an outside motor. The motor helps pump blood from the heart to the rest of the body. VADs can allow people with advanced heart failure to improve their overall symptoms and to walk more. This can be used as a long-term treatment. Or it can be used while someone waits for a donor heart for a transplant.  Heart transplant  In some cases, the diseased heart must be replaced with a healthy one from a donor.   Talk with your healthcare providers about the risks, benefits, and possible side effects of all treatments.  What are possible complications of heart failure?  Complications of heart failure include:    Fluid buildup in the lungs (pulmonary edema)    Kidney and liver failure    Stroke    Abnormal heart rhythms    Death  Key points about heart failure    When you have heart failure, the heart can t pump as well as it should.    Heart failure may result from health problems that affect the heart, such as high blood pressure, coronary artery  disease, and heart attack.    Some common symptoms are shortness of breath, weight gain, and visible swelling of the legs and ankles.    A chest X-ray can help diagnose lung congestion.    Treatment varies based on the cause of heart failure. Most people are advised to make certain lifestyle changes and to take certain medicines, often for life. Procedures such as coronary intervention and surgery may be needed.    Next steps  Tips to help you get the most from a visit to your healthcare provider:    Know the reason for your visit and what you want to happen.    Before your visit, write down questions you want answered.    Bring someone with you to help you ask questions and remember what your provider tells you.    At the visit, write down the name of a new diagnosis, and any new medicines, treatments, or tests. Also write down any new instructions your provider gives you.    Know why a new medicine or treatment is prescribed, and how it will help you. Also know what the side effects are.    Ask if your condition can be treated in other ways.    Know why a test or procedure is recommended and what the results could mean.    Know what to expect if you do not take the medicine or have the test or procedure.    If you have a follow-up appointment, write down the date, time, and purpose for that visit.    Know how you can contact your provider if you have questions.  [email or web address] last reviewed this educational content on 12/1/2018 2000-2021 [employer]. All rights reserved. This information is not intended as a substitute for professional medical care. Always follow your healthcare professional's instructions.           Patient Education     Coping with Heart Failure    It s normal to feel sad or down at times when you re living with heart failure. Some medicines can also affect your mood. Following your treatment plan may seem difficult at times. If you feel overwhelmed, just focus on one day at a time.  Don t be afraid to ask others for help when you need it.  Ways to feel better  Try not to withdraw from family and friends, even if you are finding it hard to talk to them. They can still be a good source of support. To feel better, you can also:    Spend time doing things you enjoy. This may include participating in a favorite hobby, meditating, praying, or spending time with people you care about. Find activities that make you happy and make those a priority.    Share what you learn about heart failure with the people in your life. Invite family members along when you visit your healthcare provider. This will help you feel supported as well as help you discuss the care plan you've agreed upon with your doctor.    Think about joining a support group for people with heart failure. It may be easier to talk to people who know firsthand what you re going through. They can offer advice and share stories. You may want to ask loved ones to join you for a meeting.  Asking for help  Having heart failure doesn t mean that you have to feel bad all the time. Consider talking to your healthcare provider or a therapist if:    You feel worthless or helpless, or are thinking about suicide. These are warning signs of depression. Treatment can help you feel better. When depression is under control, your overall health may also improve.    You feel anxious about what will happen to your loved ones if your health gets worse. Taking care of legal arrangements, such as a living will and durable power of , can help you feel more secure about the future.    Social support helps alleviate stress and helps your stick with your healthy lifestyle changes. Without social support, you may end up back in the hospital.  [employer] last reviewed this educational content on 4/1/2019 2000-2021 [employer]. All rights reserved. This information is not intended as a substitute for professional medical care. Always follow your  healthcare professional's instructions.               Return in about 1 week (around 6/10/2021) for In-clinic visit for if with any new symptoms.    Jad Dan MD  [facility]    Patient Instructions   You will be contacted in 24 hours for results of your lab tests.    May consider adding another 20 mg of furosemide at night for total of 40 mg in the morning and at night for the next few days if renal function is stable.       Patient Education     Heart Failure  What is heart failure?  The heart is a muscle that pumps oxygen-rich blood to all parts of the body. When you have heart failure, the heart can t pump as well as it should. Or the heart muscle can t relax and fill the pumping chamber with blood. Blood and fluid may back up into the lungs. This causes congestive heart failure. And it causes pulmonary edema. Some parts of the body also don t get enough oxygen-rich blood. This means they can't work well. These problems lead to the symptoms of heart failure.  What causes heart failure?  Heart failure may result from:    Heart valve disease    High blood pressure    Active infections of the heart valves or heart muscle, such as endocarditis    A past heart attack    Coronary artery disease    Disease of the heart muscle (cardiomyopathy)    Heart disease or problems that are present at birth (congenital)    Heart rhythm problems (arrhythmias) that lead to ongoing fast heart rates    Long-term (chronic) lung disease and pulmonary embolism    Some medicines    A reaction to medicines such as those used for chemotherapy    Anemia and too much blood loss    Complications of diabetes    Obstructive sleep apnea    Alcohol and drug abuse    Certain viral infections  What are the symptoms of heart failure?  The most common symptoms of heart failure are:    Shortness of breath while resting, exercising, or lying flat    Weight gain from water retention    Visible swelling of the legs and  ankles from fluid buildup. Sometimes the belly (abdomen) may swell.    Severe tiredness (fatigue) and weakness    Loss of appetite, nausea, and belly pain    Cough that doesn t go away. It can cause blood-tinged or frothy sputum.  The severity of the condition and symptoms depends on how much of the heart's pumping ability has been affected. The first step in managing heart failure symptoms is knowing your baselines or what s normal for you. How much do you weigh? Are you gaining weight but eating the same amount? How much can you do before you feel short of breath? Do your socks and shoes fit comfortably? Knowing what s normal for you will help you see when symptoms are getting worse. Once you know your baselines, watch for changes daily.   The symptoms of heart failure may look like other health problems. Always see your healthcare provider for a diagnosis.  How is heart failure diagnosed?  Your healthcare provider will ask about your health history. He or she will give you a physical exam. You may need tests such as:    Chest X-ray. This test makes images of internal tissues, bones, and organs on film. This test shows the size and shape of your heart. Fluid in the lungs will also show up on X-ray.    Echocardiogram. This test is also called echo. It uses sound waves to assess the motion of the heart s chambers and valves. The sound waves make an image on the screen as an ultrasound transducer is passed over the heart. This shows how well the heart pumps and relaxes. It also shows the thickness of the heart walls, and if the heart is enlarged. It is one of the most useful tests because it shows a lot of information about the heart s function. And it helps guide treatment choices.    Electrocardiogram (ECG). This test records the electrical activity of the heart. It shows abnormal rhythms. It can sometimes find heart muscle damage.    BNP testing. B-type natriuretic peptide (BNP) is a hormone released from the  ventricles that occurs with heart failure. BNP levels are useful in the quick assessment of heart failure. The higher the BNP levels, the worse the heart failure. BNP is measured from a blood sample.    Cardiac MRI. This test uses a magnetic field to make images of the heart and its nearby tissues. It can assess how the heart muscle and valves are working.  How is heart failure treated?   The cause of heart failure will guide the treatment plan. If heart failure is caused by a valve problem or coronary heart disease, then you may need a procedure. This may be a percutaneous coronary intervention. Or it may be surgery. If heart failure is caused by a problem such as anemia or an infection, you may need medicine to treat this problem.   Some causes of heart failure are reversible or short-term, such as in an acute infection. For many causes of heart failure there is no cure. But many forms of treatment can help with symptoms. They are listed below.  Lifestyle changes  These healthy habits may help with heart failure:    Controlling blood pressure    Controlling blood sugar if you have diabetes    Quitting smoking    Losing weight, if needed    Exercising more    Limiting salt and fat in your diet    Not drinking alcohol or using illicit drugs    Getting enough rest    Other important lifestyle habits include getting vaccines such as for the flu and pneumococcal pneumonia.  If you have sleep problems, getting a sleep study get help find out what s causing them. You may need to wear a C-PAP mask while you sleep. This will make sure you get enough oxygen. Too little oxygen can put stress on your heart.   Medicines  Many types of medicines are available for heart failure. They include:    Angiotensin converting enzyme (ACE) inhibitors. These lower the pressure inside the blood vessels. This reduces the pressure that the heart has to pump against. They can also help the heart have better pumping ability over  time.    Angiotensin receptor blockers (ARB). Some people get a cough and need to stop taking ACE inhibitors. If that happens, an ARB may work for you. These help relax blood vessels and reduce stress on the heart.    Angiotensin receptor-neprilysin inhibitors (ARNIs). This medicine combines an ARB and a neprilysin inhibitor. This can help the heart as noted above. And it can promote salt and water loss.    Sinus node I-f channel blocker. This may be used to lower your heart rate. Then then puts less stress on your heart.    Diuretics. These reduce the amount of fluid in the body. They are among the most important medicines in helping control fluid buildup in the body.    Vasodilators. These include hydralazine and nitroglycerin. These widen (dilate) the blood vessels. They reduce the workload on the heart.    Digitalis. This medicine helps the heart beat stronger. It may help with controlling heart rate if there is an abnormal heart rhythm.    Antiarrhythmics. These help keep normal heart rhythm.    Beta-blockers. These reduce the heart s tendency to beat faster. They can also help the heart pump better over time.    Aldosterone blockers. This blocks the effects of the hormone aldosterone. This hormone causes sodium and water retention.    Statins or PCKS9 inhibitors. These lower the amount of bad cholesterol in your blood. They are not used to treat heart failure. But you may take one if you have high cholesterol. Or you may take one if you have had a past heart attack and are at risk for heart failure. People who have inherited forms of high cholesterol (familial hypercholesterolemia) may get help from PCKS9 inhibitors. These medicines lower cholesterol.  Heart procedures  These include opening blocked arteries in the heart. This brings back blood flow to the heart muscle. It helps the ventricles squeeze as they should. The procedure can be done in the cardiac catheterization lab. It uses balloons to push plaque  and blood clots out of the artery. It also uses stents to keep the artery open. This can also be done by bypassing blockages during surgery (coronary artery bypass surgery).  Heart valve repair or replacement  In some cases medicines can t help heart failure caused by heart valves that are narrowed (stenosed) or leak (regurgitant). The heart valve can be repaired or replaced. This can be done as an open-heart procedure. Or it can be done by going through a small tube (catheter) that is put into an artery or vein.  Pacemaker  If your heart failure has also damaged your heart s electrical wiring system, a pacemaker can be implanted. This is done to restore normal heart rate and regularity. A cardiac resynchronizing pacemaker is used when 1 of the heart wires is damaged. This is often the wire located in the left ventricle. These pacemakers use implanted left and right sided wires to restore normal timing of the heart contraction in order to improve heart function.   ICD (implantable cardioverter defibrillator)  When heart muscle is damaged, dangerous heart circuits can form in the heart muscle. This leads to heart rhythms that can cause death. An ICD is implanted in the body to sense and treat these cardiac arrest rhythms. It does this by overdrive pacing the heart rhythm. Or it sends an energy shock to the heart.   VAD (ventricular assist devices)  This device is put in the chest during a surgery. It connects to an outside motor. The motor helps pump blood from the heart to the rest of the body. VADs can allow people with advanced heart failure to improve their overall symptoms and to walk more. This can be used as a long-term treatment. Or it can be used while someone waits for a donor heart for a transplant.  Heart transplant  In some cases, the diseased heart must be replaced with a healthy one from a donor.   Talk with your healthcare providers about the risks, benefits, and possible side effects of all  treatments.  What are possible complications of heart failure?  Complications of heart failure include:    Fluid buildup in the lungs (pulmonary edema)    Kidney and liver failure    Stroke    Abnormal heart rhythms    Death  Key points about heart failure    When you have heart failure, the heart can t pump as well as it should.    Heart failure may result from health problems that affect the heart, such as high blood pressure, coronary artery disease, and heart attack.    Some common symptoms are shortness of breath, weight gain, and visible swelling of the legs and ankles.    A chest X-ray can help diagnose lung congestion.    Treatment varies based on the cause of heart failure. Most people are advised to make certain lifestyle changes and to take certain medicines, often for life. Procedures such as coronary intervention and surgery may be needed.    Next steps  Tips to help you get the most from a visit to your healthcare provider:    Know the reason for your visit and what you want to happen.    Before your visit, write down questions you want answered.    Bring someone with you to help you ask questions and remember what your provider tells you.    At the visit, write down the name of a new diagnosis, and any new medicines, treatments, or tests. Also write down any new instructions your provider gives you.    Know why a new medicine or treatment is prescribed, and how it will help you. Also know what the side effects are.    Ask if your condition can be treated in other ways.    Know why a test or procedure is recommended and what the results could mean.    Know what to expect if you do not take the medicine or have the test or procedure.    If you have a follow-up appointment, write down the date, time, and purpose for that visit.    Know how you can contact your provider if you have questions.  [employer] last reviewed this educational content on 12/1/2018 2000-2021 [employer]. All rights  reserved. This information is not intended as a substitute for professional medical care. Always follow your healthcare professional's instructions.           Patient Education     Coping with Heart Failure    It s normal to feel sad or down at times when you re living with heart failure. Some medicines can also affect your mood. Following your treatment plan may seem difficult at times. If you feel overwhelmed, just focus on one day at a time. Don t be afraid to ask others for help when you need it.  Ways to feel better  Try not to withdraw from family and friends, even if you are finding it hard to talk to them. They can still be a good source of support. To feel better, you can also:    Spend time doing things you enjoy. This may include participating in a favorite hobby, meditating, praying, or spending time with people you care about. Find activities that make you happy and make those a priority.    Share what you learn about heart failure with the people in your life. Invite family members along when you visit your healthcare provider. This will help you feel supported as well as help you discuss the care plan you've agreed upon with your doctor.    Think about joining a support group for people with heart failure. It may be easier to talk to people who know firsthand what you re going through. They can offer advice and share stories. You may want to ask loved ones to join you for a meeting.  Asking for help  Having heart failure doesn t mean that you have to feel bad all the time. Consider talking to your healthcare provider or a therapist if:    You feel worthless or helpless, or are thinking about suicide. These are warning signs of depression. Treatment can help you feel better. When depression is under control, your overall health may also improve.    You feel anxious about what will happen to your loved ones if your health gets worse. Taking care of legal arrangements, such as a living will and durable  "power of , can help you feel more secure about the future.    Social support helps alleviate stress and helps your stick with your healthy lifestyle changes. Without social support, you may end up back in the hospital.  [employer] last reviewed this educational content on 4/1/2019 2000-2021 [employer]. All rights reserved. This information is not intended as a substitute for professional medical care. Always follow your healthcare professional's instructions.               Return in about 1 week (around 6/10/2021) for In-clinic visit for if with any new symptoms.    Jad Dan MD  [facility]    Srini Grover is a 71 year old who presents for the following health issues     HPI     ED/UC Followup:    Facility:  [facility] Emergency Dept  Date of visit: 05/28//2021-05/29/2021 (4 hour visit)  Reason for visit: Shortness of Breath; acute exacerbation of CHF  Current Status: Patient states that he feels like he is still retaining a lot of fluid. Patient states that he felt better the day after ED visit but has felt himself getting worse again.     Vital Signs 5/28/2021 5/28/2021 5/29/2021 5/29/2021 5/29/2021   Systolic 185 167  166    Diastolic 97 100  89    Pulse 94 95  99    Temperature 97.1 98.6      Respirations 20 18      Weight (LB) 265 lb       Height 6' 2\"       BMI (Calculated) 34.02       O2 94 96 95  97     Vital Signs 6/3/2021   Systolic 128   Diastolic 70   Pulse 80   Temperature 98.6   Respirations 16   Weight (LB) 265 lb   Height 6' 2\"   BMI (Calculated) 34.02   O2 94     CT chest on 5/28/2021  IMPRESSION:  1.  No evidence pulmonary embolus.  2.  Findings compatible with mild pulmonary edema with bilateral pleural effusions, left greater than right..    Currently on furosemide 40 mg in AM and 20 mg at night adjusted yesterday [facility] BID [facility] due to the weight gain.    Patient Active Problem List   Diagnosis     Essential " hypertension     Lumbar radiculopathy     Type 2 diabetes mellitus with complication, with long-term current use of insulin (H)     S/P spinal surgery     Chronic heart failure, unspecified heart failure type (H)     History reviewed. No pertinent surgical history.    Social History     Tobacco Use     Smoking status: Never Smoker     Smokeless tobacco: Never Used   Substance Use Topics     Alcohol use: Not on file     History reviewed. No pertinent family history.      Current Outpatient Medications   Medication Sig Dispense Refill     aspirin (ASA) 81 MG EC tablet TAKE ONE TABLET BY MOUTH EVERY DAY       atorvastatin (LIPITOR) 80 MG tablet TAKE ONE-HALF TABLET BY MOUTH AT BEDTIME FOR CHOLESTEROL       colchicine (COLCYRS) 0.6 MG tablet TAKE TWO TABLETS BY MOUTH AT FLARE ONSET AND TAKE ONE TABLET ONE HOUR LATER THEN STOP, FOR GOUT FLARE       ferrous sulfate (FEROSUL) 325 (65 Fe) MG tablet TAKE ONE TABLET BY MOUTH EVERY DAY       furosemide (LASIX) 20 MG tablet Take 1 tablet (20 mg) by mouth daily 14 tablet 0     gabapentin (NEURONTIN) 300 MG capsule TAKE 1 CAPSULE BY MOUTH AT BEDTIME       glucose-vitamin C 4-6 GM-MG CHEW CHEW FOUR TABLETS BY MOUTH  AS NEEDED FOR LOW BLOOD SUGAR       hydrochlorothiazide (HYDRODIURIL) 25 MG tablet Take 25 mg by mouth daily       indomethacin (INDOCIN) 50 MG capsule TAKE 1 CAPSULE BY MOUTH THREE TIMES A DAY AS NEEDED       insulin glargine (LANTUS VIAL) 100 UNIT/ML vial Inject 34 Units Subcutaneous 2 times daily       losartan (COZAAR) 50 MG tablet Take 50 mg by mouth At Bedtime       metFORMIN (GLUCOPHAGE) 1000 MG tablet Take 1,000 mg by mouth 2 times daily (with meals)       metoprolol succinate ER (TOPROL-XL) 100 MG 24 hr tablet TAKE ONE-HALF TABLET BY MOUTH EVERY DAY       Multiple Vitamins-Minerals (ONCOVITE) TABS Take 1 tablet by mouth       multivitamin (THERMEMS) TABS Take 1 tablet by mouth daily       potassium chloride ER (KLOR-CON M) 10 MEQ CR tablet Take 1 tablet (10  "mEq) by mouth 2 times daily 14 tablet 0     Semaglutide,0.25 or 0.5MG/DOS, 2 MG/1.5ML SOPN INJECT 0.5MG UNDER THE SKIN ONCE A WEEK FOR DIABETES -MULTIPLE DOSES PER PEN -NEEDLES ARE INCLUDED IN BOX       Allergies   Allergen Reactions     Simvastatin Muscle Pain (Myalgia)     Review of Systems   Constitutional, HEENT, cardiovascular, pulmonary, GI, , musculoskeletal, neuro, skin, endocrine and psych systems are negative, except as otherwise noted.      Objective    /70   Pulse 80   Temp 98.6  F (37  C) (Tympanic)   Resp 16   Ht 1.88 m (6' 2\")   Wt 120.2 kg (265 lb)   SpO2 94%   BMI 34.02 kg/m    Body mass index is 34.02 kg/m .  Physical Exam   GENERAL: healthy, alert and no distress, ambulatory w/o assist  NECK: no tenderness, no adenopathy,  Thyroid not enlarged  RESP: lungs clear to auscultation - no rales, no rhonchi, no wheezes  CV: regular rates and rhythm, no murmur  MS: grade 1-2+ edema on right lower extremity to the proximal lower leg; left lower extremity with immobilizer boot (not removed today) but with grade 2+ edema that patient said is about same as last few days.  SKIN: no suspicious lesions, no rashes  ABD: protuberant,  nontender    Admission on 05/28/2021, Discharged on 05/29/2021   Component Date Value Ref Range Status     WBC 05/28/2021 7.2  4.0 - 11.0 10e9/L Final     RBC Count 05/28/2021 3.96* 4.4 - 5.9 10e12/L Final     Hemoglobin 05/28/2021 11.5* 13.3 - 17.7 g/dL Final     Hematocrit 05/28/2021 35.7* 40.0 - 53.0 % Final     MCV 05/28/2021 90  78 - 100 fl Final     MCH 05/28/2021 29.0  26.5 - 33.0 pg Final     MCHC 05/28/2021 32.2  31.5 - 36.5 g/dL Final     RDW 05/28/2021 13.2  10.0 - 15.0 % Final     Platelet Count 05/28/2021 360  150 - 450 10e9/L Final     Diff Method 05/28/2021 Automated Method   Final     % Neutrophils 05/28/2021 67.9  % Final     % Lymphocytes 05/28/2021 20.4  % Final     % Monocytes 05/28/2021 7.9  % Final     % Eosinophils 05/28/2021 2.5  % Final     % " Basophils 05/28/2021 0.3  % Final     % Immature Granulocytes 05/28/2021 1.0  % Final     Nucleated RBCs 05/28/2021 0  0 /100 Final     Absolute Neutrophil 05/28/2021 4.9  1.6 - 8.3 10e9/L Final     Absolute Lymphocytes 05/28/2021 1.5  0.8 - 5.3 10e9/L Final     Absolute Monocytes 05/28/2021 0.6  0.0 - 1.3 10e9/L Final     Absolute Eosinophils 05/28/2021 0.2  0.0 - 0.7 10e9/L Final     Absolute Basophils 05/28/2021 0.0  0.0 - 0.2 10e9/L Final     Abs Immature Granulocytes 05/28/2021 0.1  0 - 0.4 10e9/L Final     Absolute Nucleated RBC 05/28/2021 0.0   Final     Sodium 05/28/2021 136  133 - 144 mmol/L Final     Potassium 05/28/2021 3.8  3.4 - 5.3 mmol/L Final     Chloride 05/28/2021 100  94 - 109 mmol/L Final     Carbon Dioxide 05/28/2021 32  20 - 32 mmol/L Final     Anion Gap 05/28/2021 4  3 - 14 mmol/L Final     Glucose 05/28/2021 238* 70 - 99 mg/dL Final     Urea Nitrogen 05/28/2021 20  7 - 30 mg/dL Final     Creatinine 05/28/2021 0.86  0.66 - 1.25 mg/dL Final     GFR Estimate 05/28/2021 88  >60 mL/min/[1.73_m2] Final    Comment: Non  GFR Calc  Starting 12/18/2018, serum creatinine based estimated GFR (eGFR) will be   calculated using the Chronic Kidney Disease Epidemiology Collaboration   (CKD-EPI) equation.       GFR Estimate If Black 05/28/2021 >90  >60 mL/min/[1.73_m2] Final    Comment:  GFR Calc  Starting 12/18/2018, serum creatinine based estimated GFR (eGFR) will be   calculated using the Chronic Kidney Disease Epidemiology Collaboration   (CKD-EPI) equation.       Calcium 05/28/2021 8.7  8.5 - 10.1 mg/dL Final     Bilirubin Total 05/28/2021 0.6  0.2 - 1.3 mg/dL Final     Albumin 05/28/2021 2.8* 3.4 - 5.0 g/dL Final     Protein Total 05/28/2021 7.8  6.8 - 8.8 g/dL Final     Alkaline Phosphatase 05/28/2021 562* 40 - 150 U/L Final     ALT 05/28/2021 33  0 - 70 U/L Final     AST 05/28/2021 21  0 - 45 U/L Final     Troponin I ES 05/28/2021 0.017  0.000 - 0.045 ug/L Final     Comment: The 99th percentile for upper reference range is 0.045 ug/L.  Troponin values   in the range of 0.045 - 0.120 ug/L may be associated with risks of adverse   clinical events.       N-Terminal Pro BNP Inpatient 05/28/2021 5,762* 0 - 900 pg/mL Final    Comment:    Reference range shown and results flagged as abnormal are suggested inpatient   cut points for confirming diagnosis if CHF in an acute setting. Establishing a   baseline value for each individual patient is useful for follow-up. An   inpatient or emergency department NT-proPBNP <300 pg/mL effectively rules out   acute CHF, with 99% negative predictive value.  The outpatient non-acute reference range for ruling out CHF is:   0-125 pg/mL (age 18 to less than 75)   0-450 pg/mL (age 75 yrs and older)       D Dimer 05/28/2021 3.3* 0.0 - 0.50 ug/ml FEU Final    Comment: This D-dimer assay is intended for use in conjunction with a clinical pretest   probability assessment model to exclude pulmonary embolism (PE) and deep   venous thrombosis (DVT) in outpatients suspected of PE or DVT. The cut-off   value is 0.5 ug/mL FEU.

## 2021-06-03 NOTE — PATIENT INSTRUCTIONS
You will be contacted in 24 hours for results of your lab tests.    May consider adding another 20 mg of furosemide at night for total of 40 mg in the morning and at night for the next few days if renal function is stable.       Patient Education     Heart Failure  What is heart failure?  The heart is a muscle that pumps oxygen-rich blood to all parts of the body. When you have heart failure, the heart can t pump as well as it should. Or the heart muscle can t relax and fill the pumping chamber with blood. Blood and fluid may back up into the lungs. This causes congestive heart failure. And it causes pulmonary edema. Some parts of the body also don t get enough oxygen-rich blood. This means they can't work well. These problems lead to the symptoms of heart failure.  What causes heart failure?  Heart failure may result from:    Heart valve disease    High blood pressure    Active infections of the heart valves or heart muscle, such as endocarditis    A past heart attack    Coronary artery disease    Disease of the heart muscle (cardiomyopathy)    Heart disease or problems that are present at birth (congenital)    Heart rhythm problems (arrhythmias) that lead to ongoing fast heart rates    Long-term (chronic) lung disease and pulmonary embolism    Some medicines    A reaction to medicines such as those used for chemotherapy    Anemia and too much blood loss    Complications of diabetes    Obstructive sleep apnea    Alcohol and drug abuse    Certain viral infections  What are the symptoms of heart failure?  The most common symptoms of heart failure are:    Shortness of breath while resting, exercising, or lying flat    Weight gain from water retention    Visible swelling of the legs and ankles from fluid buildup. Sometimes the belly (abdomen) may swell.    Severe tiredness (fatigue) and weakness    Loss of appetite, nausea, and belly pain    Cough that doesn t go away. It can cause blood-tinged or frothy sputum.  The  severity of the condition and symptoms depends on how much of the heart's pumping ability has been affected. The first step in managing heart failure symptoms is knowing your baselines or what s normal for you. How much do you weigh? Are you gaining weight but eating the same amount? How much can you do before you feel short of breath? Do your socks and shoes fit comfortably? Knowing what s normal for you will help you see when symptoms are getting worse. Once you know your baselines, watch for changes daily.   The symptoms of heart failure may look like other health problems. Always see your healthcare provider for a diagnosis.  How is heart failure diagnosed?  Your healthcare provider will ask about your health history. He or she will give you a physical exam. You may need tests such as:    Chest X-ray. This test makes images of internal tissues, bones, and organs on film. This test shows the size and shape of your heart. Fluid in the lungs will also show up on X-ray.    Echocardiogram. This test is also called echo. It uses sound waves to assess the motion of the heart s chambers and valves. The sound waves make an image on the screen as an ultrasound transducer is passed over the heart. This shows how well the heart pumps and relaxes. It also shows the thickness of the heart walls, and if the heart is enlarged. It is one of the most useful tests because it shows a lot of information about the heart s function. And it helps guide treatment choices.    Electrocardiogram (ECG). This test records the electrical activity of the heart. It shows abnormal rhythms. It can sometimes find heart muscle damage.    BNP testing. B-type natriuretic peptide (BNP) is a hormone released from the ventricles that occurs with heart failure. BNP levels are useful in the quick assessment of heart failure. The higher the BNP levels, the worse the heart failure. BNP is measured from a blood sample.    Cardiac MRI. This test uses a  magnetic field to make images of the heart and its nearby tissues. It can assess how the heart muscle and valves are working.  How is heart failure treated?   The cause of heart failure will guide the treatment plan. If heart failure is caused by a valve problem or coronary heart disease, then you may need a procedure. This may be a percutaneous coronary intervention. Or it may be surgery. If heart failure is caused by a problem such as anemia or an infection, you may need medicine to treat this problem.   Some causes of heart failure are reversible or short-term, such as in an acute infection. For many causes of heart failure there is no cure. But many forms of treatment can help with symptoms. They are listed below.  Lifestyle changes  These healthy habits may help with heart failure:    Controlling blood pressure    Controlling blood sugar if you have diabetes    Quitting smoking    Losing weight, if needed    Exercising more    Limiting salt and fat in your diet    Not drinking alcohol or using illicit drugs    Getting enough rest    Other important lifestyle habits include getting vaccines such as for the flu and pneumococcal pneumonia.  If you have sleep problems, getting a sleep study get help find out what s causing them. You may need to wear a C-PAP mask while you sleep. This will make sure you get enough oxygen. Too little oxygen can put stress on your heart.   Medicines  Many types of medicines are available for heart failure. They include:    Angiotensin converting enzyme (ACE) inhibitors. These lower the pressure inside the blood vessels. This reduces the pressure that the heart has to pump against. They can also help the heart have better pumping ability over time.    Angiotensin receptor blockers (ARB). Some people get a cough and need to stop taking ACE inhibitors. If that happens, an ARB may work for you. These help relax blood vessels and reduce stress on the heart.    Angiotensin  receptor-neprilysin inhibitors (ARNIs). This medicine combines an ARB and a neprilysin inhibitor. This can help the heart as noted above. And it can promote salt and water loss.    Sinus node I-f channel blocker. This may be used to lower your heart rate. Then then puts less stress on your heart.    Diuretics. These reduce the amount of fluid in the body. They are among the most important medicines in helping control fluid buildup in the body.    Vasodilators. These include hydralazine and nitroglycerin. These widen (dilate) the blood vessels. They reduce the workload on the heart.    Digitalis. This medicine helps the heart beat stronger. It may help with controlling heart rate if there is an abnormal heart rhythm.    Antiarrhythmics. These help keep normal heart rhythm.    Beta-blockers. These reduce the heart s tendency to beat faster. They can also help the heart pump better over time.    Aldosterone blockers. This blocks the effects of the hormone aldosterone. This hormone causes sodium and water retention.    Statins or PCKS9 inhibitors. These lower the amount of bad cholesterol in your blood. They are not used to treat heart failure. But you may take one if you have high cholesterol. Or you may take one if you have had a past heart attack and are at risk for heart failure. People who have inherited forms of high cholesterol (familial hypercholesterolemia) may get help from PCKS9 inhibitors. These medicines lower cholesterol.  Heart procedures  These include opening blocked arteries in the heart. This brings back blood flow to the heart muscle. It helps the ventricles squeeze as they should. The procedure can be done in the cardiac catheterization lab. It uses balloons to push plaque and blood clots out of the artery. It also uses stents to keep the artery open. This can also be done by bypassing blockages during surgery (coronary artery bypass surgery).  Heart valve repair or replacement  In some cases  medicines can t help heart failure caused by heart valves that are narrowed (stenosed) or leak (regurgitant). The heart valve can be repaired or replaced. This can be done as an open-heart procedure. Or it can be done by going through a small tube (catheter) that is put into an artery or vein.  Pacemaker  If your heart failure has also damaged your heart s electrical wiring system, a pacemaker can be implanted. This is done to restore normal heart rate and regularity. A cardiac resynchronizing pacemaker is used when 1 of the heart wires is damaged. This is often the wire located in the left ventricle. These pacemakers use implanted left and right sided wires to restore normal timing of the heart contraction in order to improve heart function.   ICD (implantable cardioverter defibrillator)  When heart muscle is damaged, dangerous heart circuits can form in the heart muscle. This leads to heart rhythms that can cause death. An ICD is implanted in the body to sense and treat these cardiac arrest rhythms. It does this by overdrive pacing the heart rhythm. Or it sends an energy shock to the heart.   VAD (ventricular assist devices)  This device is put in the chest during a surgery. It connects to an outside motor. The motor helps pump blood from the heart to the rest of the body. VADs can allow people with advanced heart failure to improve their overall symptoms and to walk more. This can be used as a long-term treatment. Or it can be used while someone waits for a donor heart for a transplant.  Heart transplant  In some cases, the diseased heart must be replaced with a healthy one from a donor.   Talk with your healthcare providers about the risks, benefits, and possible side effects of all treatments.  What are possible complications of heart failure?  Complications of heart failure include:    Fluid buildup in the lungs (pulmonary edema)    Kidney and liver failure    Stroke    Abnormal heart rhythms    Death  Key  points about heart failure    When you have heart failure, the heart can t pump as well as it should.    Heart failure may result from health problems that affect the heart, such as high blood pressure, coronary artery disease, and heart attack.    Some common symptoms are shortness of breath, weight gain, and visible swelling of the legs and ankles.    A chest X-ray can help diagnose lung congestion.    Treatment varies based on the cause of heart failure. Most people are advised to make certain lifestyle changes and to take certain medicines, often for life. Procedures such as coronary intervention and surgery may be needed.    Next steps  Tips to help you get the most from a visit to your healthcare provider:    Know the reason for your visit and what you want to happen.    Before your visit, write down questions you want answered.    Bring someone with you to help you ask questions and remember what your provider tells you.    At the visit, write down the name of a new diagnosis, and any new medicines, treatments, or tests. Also write down any new instructions your provider gives you.    Know why a new medicine or treatment is prescribed, and how it will help you. Also know what the side effects are.    Ask if your condition can be treated in other ways.    Know why a test or procedure is recommended and what the results could mean.    Know what to expect if you do not take the medicine or have the test or procedure.    If you have a follow-up appointment, write down the date, time, and purpose for that visit.    Know how you can contact your provider if you have questions.  [employer] last reviewed this educational content on 12/1/2018 2000-2021 [employer]. All rights reserved. This information is not intended as a substitute for professional medical care. Always follow your healthcare professional's instructions.           Patient Education     Coping with Heart Failure    It s normal to feel  sad or down at times when you re living with heart failure. Some medicines can also affect your mood. Following your treatment plan may seem difficult at times. If you feel overwhelmed, just focus on one day at a time. Don t be afraid to ask others for help when you need it.  Ways to feel better  Try not to withdraw from family and friends, even if you are finding it hard to talk to them. They can still be a good source of support. To feel better, you can also:    Spend time doing things you enjoy. This may include participating in a favorite hobby, meditating, praying, or spending time with people you care about. Find activities that make you happy and make those a priority.    Share what you learn about heart failure with the people in your life. Invite family members along when you visit your healthcare provider. This will help you feel supported as well as help you discuss the care plan you've agreed upon with your doctor.    Think about joining a support group for people with heart failure. It may be easier to talk to people who know firsthand what you re going through. They can offer advice and share stories. You may want to ask loved ones to join you for a meeting.  Asking for help  Having heart failure doesn t mean that you have to feel bad all the time. Consider talking to your healthcare provider or a therapist if:    You feel worthless or helpless, or are thinking about suicide. These are warning signs of depression. Treatment can help you feel better. When depression is under control, your overall health may also improve.    You feel anxious about what will happen to your loved ones if your health gets worse. Taking care of legal arrangements, such as a living will and durable power of , can help you feel more secure about the future.    Social support helps alleviate stress and helps your stick with your healthy lifestyle changes. Without social support, you may end up back in the  [facility]  Mary Grace last reviewed this educational content on 4/1/2019 2000-2021 [employer]. All rights reserved. This information is not intended as a substitute for professional medical care. Always follow your healthcare professional's instructions.

## 2021-06-08 NOTE — ANESTHESIA POSTPROCEDURE EVALUATION
Patient: Reilly Borja  RIGHT L5-S1 TRANSFORAMINAL LUMBAR INTERBODY FUSION  Anesthesia type: general    Patient location: PACU  Last vitals:   Vitals:    02/06/17 1349   BP: 138/73   Pulse: 80   Resp: 11   Temp: 36.3  C (97.4  F)   SpO2: 100%     Post vital signs: stable  Level of consciousness: awake and responds to simple questions  Post-anesthesia pain: pain controlled  Post-anesthesia nausea and vomiting: no  Pulmonary: unassisted, return to baseline  Cardiovascular: stable and blood pressure at baseline  Hydration: adequate  Anesthetic events: no    QCDR Measures:  ASA# 11 - Rebekah-op Cardiac Arrest: ASA11B - Patient did NOT experience unanticipated cardiac arrest  ASA# 12 - Rebekah-op Mortality Rate: ASA12B - Patient did NOT die  ASA# 13 - PACU Re-Intubation Rate: ASA13B - Patient did NOT require a new airway mgmt  ASA# 10 - Composite Anes Safety: ASA10A - No serious adverse event  ASA# 38 - New Corneal Injury: ASA38A - No new exposure keratitis or corneal abrasion in PACU    Additional Notes:

## 2021-06-08 NOTE — ANESTHESIA PREPROCEDURE EVALUATION
Anesthesia Evaluation      Patient summary reviewed   No history of anesthetic complications     Airway   Mallampati: II  Neck ROM: full   Pulmonary                           Cardiovascular   (+) hypertension well controlled, ,     Rhythm: regular  Rate: normal,         Neuro/Psych - negative ROS     Endo/Other    (+) diabetes mellitus type 2 poorly controlled using insulin, obesity,      GI/Hepatic/Renal - negative ROS           Dental - normal exam                        Anesthesia Plan  Planned anesthetic: general endotracheal  propofol  ASA 3   Induction: intravenous   Anesthetic plan and risks discussed with: patient    Post-op plan: routine recovery

## 2021-06-08 NOTE — ANESTHESIA CARE TRANSFER NOTE
Last vitals:   Vitals:    02/06/17 1349   BP: 138/73   Pulse: 80   Resp: 11   Temp: 36.3  C (97.4  F)   SpO2: 100%     Patient's level of consciousness is awake  Spontaneous respirations: yes  Maintains airway independently: yes  Dentition unchanged: yes  Oropharynx: oropharynx clear of all foreign objects    QCDR Measures:  ASA# 20 - Surgical Safety Checklist: ASA20A - Safety Checks Done  PQRS# 430 - Adult PONV Prevention: 4558F - Pt received => 2 anti-emetic agents (different classes) preop & intraop  ASA# 8 - Peds PONV Prevention: 4558F - Pt received => 2 anti-emetic agents (different classes) preop & intraop  PQRS# 424 - Rebekah-op Temp Management: 4559F - At least one body temp DOCUMENTED => 35.5C or 95.9F within required timeframe  PQRS# 426 - PACU Transfer Protocol: - Transfer of care checklist used  ASA# 14 - Acute Post-op Pain: ASA14B - Patient did NOT experience pain >= 7 out of 10   The patient is Spont Breathing, responding to commands,  Reflexes  Intact.  VSS    I completed my SBAR handoff to the receiving nurse per policy and procedure.

## 2021-06-25 NOTE — TELEPHONE ENCOUNTER
Patient calling reporting having difficulty of breathing when attempting to sleep. States he has not slept well for 4 nights. States he has a history of having congestive heart disease. Per guideline, advised patient to be seen at the emergency department.     Kelvin Ledbetter RN  [facility]     COVID 19 Nurse Triage Plan/Patient Instructions    Please be aware that novel coronavirus (COVID-19) may be circulating in the community. If you develop symptoms such as fever, cough, or SOB or if you have concerns about the presence of another infection including coronavirus (COVID-19), please contact your health care provider or visit  [email or web address].    Disposition/Instructions    ED Visit recommended. Follow protocol based instructions.      Bring Your Own Device:  Please also bring your smart device(s) (smart phones, tablets, laptops) and their charging cables for your personal use and to communicate with your care team during your visit.      Thank you for taking steps to prevent the spread of this virus.  o Limit your contact with others.  o Wear a simple mask to cover your cough.  o Wash your hands well and often.    Resources    [facility]: About COVID-19: [email or web address]    CDC: What to Do If You're Sick: www.cdc.gov/coronavirus/2019-ncov/about/steps-when-sick.html    CDC: Ending Home Isolation: www.cdc.gov/coronavirus/2019-ncov/hcp/disposition-in-home-patients.html     CDC: Caring for Someone: www.cdc.gov/coronavirus/2019-ncov/if-you-are-sick/care-for-someone.html     [facility]: Interim Guidance for Hospital Discharge to Home: www.health.[facility].[place]/diseases/coronavirus/hcp/hospdischarge.pdf    [facility] clinical trials (COVID-19 research studies): clinicalaffairs.[email or web address]-trials     Below are the COVID-19 hotlines at the [facility] ([facility]). Interpreters are available.   o For health questions: Call 396-625-1686 or  1-795.988.1703 (7 a.m. to 7 p.m.)  o For questions about schools and childcare: Call 064-427-3290 or 1-283.428.2509 (7 a.m. to 7 p.m.)       Reason for Disposition    [1] MODERATE difficulty breathing (e.g., speaks in phrases, SOB even at rest, pulse 100-120) AND [2] NEW-onset or WORSE than normal    Additional Information    Difficulty breathing, severe    Negative: [1] Breathing stopped AND [2] hasn't returned    Negative: Choking on something    Negative: Severe difficulty breathing (e.g., struggling for each breath, speaks in single words)    Negative: Bluish (or gray) lips or face now    Negative: Difficult to awaken or acting confused (e.g., disoriented, slurred speech)    Negative: Passed out (i.e., lost consciousness, collapsed and was not responding)    Negative: Wheezing started suddenly after medicine, an allergic food or bee sting    Negative: Stridor    Negative: Slow, shallow and weak breathing    Negative: Sounds like a life-threatening emergency to the triager    Protocols used: BREATHING DIFFICULTY-A-AH, RESPIRATORY MULTIPLE SYMPTOMS - GUIDELINE [id number]

## 2021-09-14 ENCOUNTER — LAB (OUTPATIENT)
Dept: LAB | Facility: CLINIC | Age: 71
End: 2021-09-14
Attending: ORTHOPAEDIC SURGERY
Payer: MEDICARE

## 2021-09-14 DIAGNOSIS — M86.8X3 OTHER OSTEOMYELITIS OF RADIUS, UNSPECIFIED LATERALITY (H): ICD-10-CM

## 2021-09-14 PROCEDURE — U0005 INFEC AGEN DETEC AMPLI PROBE: HCPCS

## 2021-09-14 PROCEDURE — U0003 INFECTIOUS AGENT DETECTION BY NUCLEIC ACID (DNA OR RNA); SEVERE ACUTE RESPIRATORY SYNDROME CORONAVIRUS 2 (SARS-COV-2) (CORONAVIRUS DISEASE [COVID-19]), AMPLIFIED PROBE TECHNIQUE, MAKING USE OF HIGH THROUGHPUT TECHNOLOGIES AS DESCRIBED BY CMS-2020-01-R: HCPCS

## 2021-09-15 LAB — SARS-COV-2 RNA RESP QL NAA+PROBE: NEGATIVE

## 2021-09-16 ENCOUNTER — OFFICE VISIT (OUTPATIENT)
Dept: FAMILY MEDICINE | Facility: CLINIC | Age: 71
End: 2021-09-16
Payer: MEDICARE

## 2021-09-16 VITALS
HEART RATE: 82 BPM | WEIGHT: 265 LBS | BODY MASS INDEX: 34.01 KG/M2 | RESPIRATION RATE: 16 BRPM | SYSTOLIC BLOOD PRESSURE: 132 MMHG | TEMPERATURE: 98 F | HEIGHT: 74 IN | OXYGEN SATURATION: 95 % | DIASTOLIC BLOOD PRESSURE: 76 MMHG

## 2021-09-16 DIAGNOSIS — Z01.818 PREOP GENERAL PHYSICAL EXAM: Primary | ICD-10-CM

## 2021-09-16 DIAGNOSIS — D50.9 IRON DEFICIENCY ANEMIA, UNSPECIFIED IRON DEFICIENCY ANEMIA TYPE: ICD-10-CM

## 2021-09-16 DIAGNOSIS — M14.672 CHARCOT'S JOINT OF LEFT FOOT: ICD-10-CM

## 2021-09-16 DIAGNOSIS — Z79.4 TYPE 2 DIABETES MELLITUS WITH COMPLICATION, WITH LONG-TERM CURRENT USE OF INSULIN (H): ICD-10-CM

## 2021-09-16 DIAGNOSIS — E11.8 TYPE 2 DIABETES MELLITUS WITH COMPLICATION, WITH LONG-TERM CURRENT USE OF INSULIN (H): ICD-10-CM

## 2021-09-16 DIAGNOSIS — I50.9 CHRONIC HEART FAILURE, UNSPECIFIED HEART FAILURE TYPE (H): ICD-10-CM

## 2021-09-16 DIAGNOSIS — I10 ESSENTIAL HYPERTENSION: ICD-10-CM

## 2021-09-16 PROBLEM — Z87.39 HX OF GOUT: Status: ACTIVE | Noted: 2021-09-16

## 2021-09-16 PROBLEM — H02.30 BLEPHAROCHALASIS: Status: ACTIVE | Noted: 2021-09-16

## 2021-09-16 PROBLEM — H43.811 VITREOUS DEGENERATION OF RIGHT EYE: Status: ACTIVE | Noted: 2021-09-16

## 2021-09-16 PROBLEM — H52.209 ASTIGMATISM: Status: ACTIVE | Noted: 2021-09-16

## 2021-09-16 PROBLEM — H26.9 CATARACT: Status: ACTIVE | Noted: 2021-09-16

## 2021-09-16 PROBLEM — K76.0 NONALCOHOLIC FATTY LIVER DISEASE: Status: ACTIVE | Noted: 2021-09-16

## 2021-09-16 PROBLEM — E78.00 PURE HYPERCHOLESTEROLEMIA: Status: ACTIVE | Noted: 2021-09-16

## 2021-09-16 PROBLEM — E66.3 OVERWEIGHT: Status: ACTIVE | Noted: 2021-09-16

## 2021-09-16 PROBLEM — H52.4 PRESBYOPIA: Status: ACTIVE | Noted: 2021-09-16

## 2021-09-16 PROBLEM — M25.572 PAIN IN LEFT ANKLE: Status: ACTIVE | Noted: 2021-09-16

## 2021-09-16 PROBLEM — Z86.0100 PERSONAL HISTORY OF COLONIC POLYPS: Status: ACTIVE | Noted: 2021-09-16

## 2021-09-16 LAB
ANION GAP SERPL CALCULATED.3IONS-SCNC: 1 MMOL/L (ref 3–14)
BUN SERPL-MCNC: 19 MG/DL (ref 7–30)
CALCIUM SERPL-MCNC: 8.4 MG/DL (ref 8.5–10.1)
CHLORIDE BLD-SCNC: 99 MMOL/L (ref 94–109)
CO2 SERPL-SCNC: 35 MMOL/L (ref 20–32)
CREAT SERPL-MCNC: 0.88 MG/DL (ref 0.66–1.25)
ERYTHROCYTE [DISTWIDTH] IN BLOOD BY AUTOMATED COUNT: 17.2 % (ref 10–15)
GFR SERPL CREATININE-BSD FRML MDRD: 87 ML/MIN/1.73M2
GLUCOSE BLD-MCNC: 159 MG/DL (ref 70–99)
HBA1C MFR BLD: 7.3 % (ref 0–5.6)
HCT VFR BLD AUTO: 33.5 % (ref 40–53)
HGB BLD-MCNC: 10.5 G/DL (ref 13.3–17.7)
MCH RBC QN AUTO: 26.3 PG (ref 26.5–33)
MCHC RBC AUTO-ENTMCNC: 31.3 G/DL (ref 31.5–36.5)
MCV RBC AUTO: 84 FL (ref 78–100)
PLATELET # BLD AUTO: 327 10E3/UL (ref 150–450)
POTASSIUM BLD-SCNC: 4.2 MMOL/L (ref 3.4–5.3)
RBC # BLD AUTO: 3.99 10E6/UL (ref 4.4–5.9)
SODIUM SERPL-SCNC: 135 MMOL/L (ref 133–144)
WBC # BLD AUTO: 5.9 10E3/UL (ref 4–11)

## 2021-09-16 PROCEDURE — 83036 HEMOGLOBIN GLYCOSYLATED A1C: CPT | Performed by: NURSE PRACTITIONER

## 2021-09-16 PROCEDURE — 99214 OFFICE O/P EST MOD 30 MIN: CPT | Performed by: NURSE PRACTITIONER

## 2021-09-16 PROCEDURE — 80048 BASIC METABOLIC PNL TOTAL CA: CPT | Performed by: NURSE PRACTITIONER

## 2021-09-16 PROCEDURE — 36415 COLL VENOUS BLD VENIPUNCTURE: CPT | Performed by: NURSE PRACTITIONER

## 2021-09-16 PROCEDURE — 85027 COMPLETE CBC AUTOMATED: CPT | Performed by: NURSE PRACTITIONER

## 2021-09-16 PROCEDURE — 93000 ELECTROCARDIOGRAM COMPLETE: CPT | Performed by: NURSE PRACTITIONER

## 2021-09-16 ASSESSMENT — MIFFLIN-ST. JEOR: SCORE: 2031.78

## 2021-09-16 NOTE — PROGRESS NOTES
[facility]  [address]  [facility] [address]  Phone: [phone]  Primary Provider: No Ref-Primary, Physician  {FV AMB Performing Provider (Optional):657776}    {Provider  Link to PREOP SmartSet  Use this to apply standard patient instructions to AVS; includes medication directions, common orders, guidelines for anemia, warfarin, additional testing   :044769}  PREOPERATIVE EVALUATION:  Today's date: 9/16/2021    Reilly Borja is a 71 year old male who presents for a preoperative evaluation.    Surgical Information:  Surgery/Procedure: ***  Surgery Location: ***  Surgeon: ***  Surgery Date: ***  Time of Surgery: ***  Where patient plans to recover: {Preop post recovery plans :242775}  Fax number for surgical facility: {SURGERY FAX NUMBER:[id number]}    Type of Anesthesia Anticipated: {ANESTHESIA:202822}    {2021 Provider Charting Preference for Preop :081120}    Subjective     HPI related to upcoming procedure: ***    {Click here to pull in Questionnaire Data after Qnr completed :995524}  Health Care Directive:  Patient does not have a Health Care Directive or Living Will: {ADVANCE_DIRECTIVE_STATUS:016442}    Preoperative Review of :  {Mnpmpreport:547334}  {Review MNPMP for all patients per ICSI MNPMP Profile:454839}    {Chronic problem details (Optional) :437245}    Review of Systems  {ROS Preop Choices:458887}    Patient Active Problem List    Diagnosis Date Noted     Chronic heart failure, unspecified heart failure type (H) 06/03/2021     Priority: Medium     Essential hypertension 11/16/2019     Priority: Medium     Type 2 diabetes mellitus with complication, with long-term current use of insulin (H) 11/16/2019     Priority: Medium     S/P spinal surgery 11/16/2019     Priority: Medium     Lumbar radiculopathy 02/06/2017     Priority: Medium      Past Medical History:   Diagnosis Date     Congestive heart failure (H)      Diabetes (H)      Hypertension      Past  Surgical History:   Procedure Laterality Date     ARTHROSCOPY KNEE Left      C LUMBAR SPINE FUSN,POST INTRBDY Right 2/6/2017    Procedure: RIGHT L5-S1 TRANSFORAMINAL LUMBAR INTERBODY FUSION;  Surgeon: Rajiv Zavala MD;  Location: [facility];  Service: Spine     Current Outpatient Medications   Medication Sig Dispense Refill     atorvastatin (LIPITOR) 80 MG tablet TAKE ONE-HALF TABLET BY MOUTH AT BEDTIME FOR CHOLESTEROL       colchicine (COLCYRS) 0.6 MG tablet TAKE TWO TABLETS BY MOUTH AT FLARE ONSET AND TAKE ONE TABLET ONE HOUR LATER THEN STOP, FOR GOUT FLARE       ferrous sulfate (FEROSUL) 325 (65 Fe) MG tablet TAKE ONE TABLET BY MOUTH EVERY DAY       furosemide (LASIX) 20 MG tablet Take 1 tablet (20 mg) by mouth 2 times daily 14 tablet 0     gabapentin (NEURONTIN) 300 MG capsule TAKE 1 CAPSULE BY MOUTH AT BEDTIME       glucose-vitamin C 4-6 GM-MG CHEW CHEW FOUR TABLETS BY MOUTH  AS NEEDED FOR LOW BLOOD SUGAR       hydrochlorothiazide (HYDRODIURIL) 25 MG tablet Take 25 mg by mouth daily       indomethacin (INDOCIN) 50 MG capsule TAKE 1 CAPSULE BY MOUTH THREE TIMES A DAY AS NEEDED       insulin glargine (LANTUS VIAL) 100 UNIT/ML vial Inject 34 Units Subcutaneous 2 times daily       losartan (COZAAR) 50 MG tablet Take 50 mg by mouth At Bedtime       metFORMIN (GLUCOPHAGE) 1000 MG tablet Take 1,000 mg by mouth 2 times daily (with meals)       metoprolol succinate ER (TOPROL-XL) 100 MG 24 hr tablet TAKE ONE-HALF TABLET BY MOUTH EVERY DAY       Multiple Vitamins-Minerals (ONCOVITE) TABS Take 1 tablet by mouth       multivitamin (THERMEMS) TABS Take 1 tablet by mouth daily       potassium chloride ER (KLOR-CON M) 10 MEQ CR tablet Take 1 tablet (10 mEq) by mouth 2 times daily 14 tablet 0     Semaglutide,0.25 or 0.5MG/DOS, 2 MG/1.5ML SOPN INJECT 0.5MG UNDER THE SKIN ONCE A WEEK FOR DIABETES -MULTIPLE DOSES PER PEN -NEEDLES ARE INCLUDED IN BOX         Allergies   Allergen Reactions     Simvastatin Muscle Pain  "(Myalgia)        Social History     Tobacco Use     Smoking status: Never Smoker     Smokeless tobacco: Never Used   Substance Use Topics     Alcohol use: Not on file     {FAMILY HISTORY (Optional):597100219}  History   Drug Use Not on file         Objective     There were no vitals taken for this visit.    Physical Exam  {EXAM Preop Choices:615511}    Recent Labs   Lab Test 21  1347 21  2317 19  1004 19  1004   HGB  --  11.5*  --  14.2   PLT  --  360  --  203    136   < > 142   POTASSIUM 4.3 3.8   < > 3.7   CR 0.90 0.86   < > 0.80    < > = values in this interval not displayed.        Diagnostics:  {LABS:144333}   {EK}    Revised Cardiac Risk Index (RCRI):  The patient has the following serious cardiovascular risks for perioperative complications:  {PREOP REVISED CARDIAC RISK INDEX (RCRI) :595848::\" - No serious cardiac risks = 0 points\"}     RCRI Interpretation: {REVISED CARDIAC RISK INTERPRETATION :135308}         Signed Electronically by: Molly Mckeon NP  Copy of this evaluation report is provided to requesting physician.    {Provider Resources  Preop [facility] Preop Guidelines  Revised Cardiac Risk Index :761589}  "

## 2021-09-16 NOTE — PATIENT INSTRUCTIONS

## 2021-09-16 NOTE — H&P (VIEW-ONLY)
[facility]  [address]  [facility] [address]  Phone: [phone]  Primary Provider: No Ref-Primary, Physician  Pre-op Performing Provider: MEREDITH RICH    PREOPERATIVE EVALUATION:  Today's date: 9/16/2021    Reilly Borja is a 71 year old male who presents for a preoperative evaluation.    Surgical Information:  Surgery/Procedure: Left charcot foot reconstruction  Sur[place] [facility] [place]  Surgeon: Julius Campbell DO  Surgery Date: 9/17/2021  Time of Surgery: 1:00 pm   Where patient plans to recover: At home with family  Fax number for surgical facility: Note does not need to be faxed, will be available electronically in Epic.     Type of Anesthesia Anticipated: Choice    Assessment & Plan     The proposed surgical procedure is considered INTERMEDIATE risk.    Preop general physical exam  Patient has normal exam today.  Labs and EKG ordered.  Discussed medications and what to hold.    - Hemoglobin A1c; Future  - CBC with platelets; Future  - Basic metabolic panel  (Ca, Cl, CO2, Creat, Gluc, K, Na, BUN); Future    Type 2 diabetes mellitus with complication, with long-term current use of insulin (H)  Stable.  A1c ordered for evaluation prior to surgery that is pending.  - Hemoglobin A1c; Future    Essential hypertension  Stable.  Ordered BMP for evaluation prior to surgery that is pending.  - Basic metabolic panel  (Ca, Cl, CO2, Creat, Gluc, K, Na, BUN); Future    Chronic heart failure, unspecified heart failure type (H)  EKG is stable as compared to recent EKG in May.    Iron deficiency anemia, unspecified iron deficiency anemia type  Stable.  Taking iron supplement.  Unable to determine cause if chronic disease, diet or other due to not having access to patient's primary care medical records.  - CBC with platelets; Future    Risks and Recommendations:  The patient has the following additional risks and recommendations for  perioperative complications:  Diabetes:  - Patient is on insulin therapy; diabetic NPO guidelines provided and discussed.  Anemia/Bleeding/Clotting:    - Taking iron, CBC ordered prior to surgery    Medication Instructions:   - ACE/ARB: May be continued on the day of surgery.    - Diuretics: HOLD on the day of surgery.   - Statins: Continue taking on the day of surgery.    - Long acting insulin (e.g. glargine, detemir): Take 80% of the usual evening or morning dose before surgery.{    RECOMMENDATION:  APPROVAL GIVEN to proceed with proposed procedure without further diagnostic evaluation.    Subjective     HPI related to upcoming procedure: Charcot in left foot that now needs removal of hardware and possibly reconstruct the foot.    Preop Questions 9/16/2021   1. Have you ever had a heart attack or stroke? No   2. Have you ever had surgery on your heart or blood vessels, such as a stent placement, a coronary artery bypass, or surgery on an artery in your head, neck, heart, or legs? No   3. Do you have chest pain with activity? No   4. Do you have a history of  heart failure? YES - Congestive Heart Failure, Systolic   5. Do you currently have a cold, bronchitis or symptoms of other infection? No   6. Do you have a cough, shortness of breath, or wheezing? No   7. Do you or anyone in your family have previous history of blood clots? No   8. Do you or does anyone in your family have a serious bleeding problem such as prolonged bleeding following surgeries or cuts? No   9. Have you ever had problems with anemia or been told to take iron pills? Yes - Unknown cause, taking iron supplements   10. Have you had any abnormal blood loss such as black, tarry or bloody stools? No   11. Have you ever had a blood transfusion? No   12. Are you willing to have a blood transfusion if it is medically needed before, during, or after your surgery? Yes   13. Have you or any of your relatives ever had problems with anesthesia? No   14. Do  you have sleep apnea, excessive snoring or daytime drowsiness? No   15. Do you have any artifical heart valves or other implanted medical devices like a pacemaker, defibrillator, or continuous glucose monitor? No   16. Do you have artificial joints? No   17. Are you allergic to latex? No       Health Care Directive:  Patient does not have a Health Care Directive or Living Will: Discussed advance care planning with patient; information given to patient to review.    Preoperative Review of :   reviewed - no record of controlled substances prescribed.    Status of Chronic Conditions:  ANEMIA - Patient has a recent history of moderate-severe anemia, which has not been symptomatic. Work up to date has revealed unknown (unable to retrieve health record at PCP). Treatment has been Iron supplements.     CHF - Patient has a longstanding history of moderate-severe CHF. Exacerbating conditions include hypertension. Currently the patient's condition is same. Current treatment regimen includes Angiotensin 2 receptor blocker and diuretic. The patient denies chest pain, edema, orthopnea, SOB or recent weight gain. Last Echocardiogram 11/19, EKG 5/2021.     DIABETES - Patient has a longstanding history of DiabetesType Type II . Patient is being treated with diet, oral agents and insulin injections and denies significant side effects. Control has been good. Complicating factors include but are not limited to: hypertension, hyperlipidemia, retinopathy and neuropathy.     HYPERLIPIDEMIA - Patient has a long history of significant Hyperlipidemia requiring medication for treatment with recent good control. Patient reports no problems or side effects with the medication.     HYPERTENSION - Patient has longstanding history of HTN , currently denies any symptoms referable to elevated blood pressure. Specifically denies chest pain, palpitations, dyspnea, orthopnea, PND or peripheral edema. Blood pressure readings have been in normal  range. Current medication regimen is as listed below. Patient denies any side effects of medication.       Review of Systems  CONSTITUTIONAL: NEGATIVE for fever, chills, change in weight  INTEGUMENTARY/SKIN: NEGATIVE for worrisome rashes, moles or lesions  EYES: POSITIVE for floater in right eye, hx of retinopathy  ENT/MOUTH: NEGATIVE for ear, mouth and throat problems  RESP: NEGATIVE for significant cough or SOB  CV: NEGATIVE for chest pain, palpitations or peripheral edema  CV: POSITIVE for left leg swelling and NEGATIVE for chest pain/chest pressure, dyspnea on exertion and HX HTN  GI: NEGATIVE for nausea, abdominal pain, heartburn, or change in bowel habits  : NEGATIVE for frequency, dysuria, or hematuria  MUSCULOSKELETAL:POSITIVE  for pain in surgical area of left foot with charcot  NEURO: NEGATIVE for weakness, dizziness or paresthesias  ENDOCRINE: NEGATIVE for temperature intolerance, skin/hair changes  HEME: NEGATIVE for bleeding problems  PSYCHIATRIC: NEGATIVE for changes in mood or affect    Patient Active Problem List    Diagnosis Date Noted     Iron deficiency anemia, unspecified 09/16/2021     Priority: Medium     Vitreous degeneration of right eye 09/16/2021     Priority: Medium     Hx of gout 09/16/2021     Priority: Medium     Pain in left ankle 09/16/2021     Priority: Medium     Personal history of colonic polyps 09/16/2021     Priority: Medium     Astigmatism 09/16/2021     Priority: Medium     Blepharochalasis 09/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     Priority: Medium     Cataract 09/16/2021     Priority: Medium     Nonalcoholic fatty liver disease 09/16/2021     Priority: Medium     Overweight 09/16/2021     Priority: Medium     Presbyopia 09/16/2021     Priority: Medium     Pure hypercholesterolemia 09/16/2021     Priority: Medium     Chronic heart failure, unspecified heart failure type (H) 06/03/2021     Priority: Medium     Essential hypertension 11/16/2019     Priority: Medium     Type 2 diabetes mellitus with  complication, with long-term current use of insulin (H) 11/16/2019     Priority: Medium     S/P spinal surgery 11/16/2019     Priority: Medium     Lumbar radiculopathy 02/06/2017     Priority: Medium      Past Medical History:   Diagnosis Date     Congestive heart failure (H)      Diabetes (H)      Hypertension      Iron deficiency anemia secondary to inadequate dietary iron intake      Past Surgical History:   Procedure Laterality Date     ARTHROSCOPY KNEE Left      C LUMBAR SPINE FUSN,POST INTRBDY Right 02/06/2017    Procedure: RIGHT L5-S1 TRANSFORAMINAL LUMBAR INTERBODY FUSION;  Surgeon: Rajiv Zavala MD;  Location: [facility];  Service: Spine     OTHER SURGICAL HISTORY Left     surgery for charcot of left foot     Current Outpatient Medications   Medication Sig Dispense Refill     atorvastatin (LIPITOR) 80 MG tablet TAKE ONE-HALF TABLET BY MOUTH AT BEDTIME FOR CHOLESTEROL       furosemide (LASIX) 20 MG tablet Take 1 tablet (20 mg) by mouth 2 times daily 14 tablet 0     gabapentin (NEURONTIN) 300 MG capsule TAKE 1 CAPSULE BY MOUTH AT BEDTIME       glucose-vitamin C 4-6 GM-MG CHEW CHEW FOUR TABLETS BY MOUTH  AS NEEDED FOR LOW BLOOD SUGAR       insulin glargine (LANTUS VIAL) 100 UNIT/ML vial Inject 34 Units Subcutaneous 2 times daily       losartan (COZAAR) 50 MG tablet Take 50 mg by mouth At Bedtime       metFORMIN (GLUCOPHAGE) 1000 MG tablet Take 1,000 mg by mouth 2 times daily (with meals)       multivitamin (THERMEMS) TABS Take 1 tablet by mouth daily       Semaglutide,0.25 or 0.5MG/DOS, 2 MG/1.5ML SOPN INJECT 0.5MG UNDER THE SKIN ONCE A WEEK FOR DIABETES -MULTIPLE DOSES PER PEN -NEEDLES ARE INCLUDED IN BOX         Allergies   Allergen Reactions     Simvastatin Muscle Pain (Myalgia)        Social History     Tobacco Use     Smoking status: Never Smoker     Smokeless tobacco: Never Used   Substance Use Topics     Alcohol use: Not Currently     Family History   Problem Relation Age of Onset      "Diabetes Mother      History   Drug Use Unknown         Objective     /76   Pulse 82   Temp 98  F (36.7  C) (Tympanic)   Resp 16   Ht 1.88 m (6' 2\")   Wt 120.2 kg (265 lb)   SpO2 95%   BMI 34.02 kg/m      Physical Exam    GENERAL APPEARANCE: healthy, alert and no distress     EYES: EOMI,  PERRL     HENT: ear canals and TM's normal and nose and mouth without ulcers or lesions     NECK: no adenopathy, no asymmetry, masses, or scars and thyroid normal to palpation     RESP: lungs clear to auscultation - no rales, rhonchi or wheezes     CV: regular rates and rhythm, normal S1 S2, no S3 or S4 and no murmur, click or rub     ABDOMEN:  soft, nontender, no HSM or masses and bowel sounds normal     MS: extremities normal- no gross deformities noted, no evidence of inflammation in joints, FROM in all extremities.     SKIN: no suspicious lesions or rashes     NEURO: Normal strength and tone, sensory exam grossly normal, mentation intact and speech normal     PSYCH: mentation appears normal. and affect normal/bright     LYMPHATICS: No cervical adenopathy    Recent Labs   Lab Test 06/03/21  1347 05/28/21  2317 11/16/19  1004 11/16/19  1004   HGB  --  11.5*  --  14.2   PLT  --  360  --  203    136   < > 142   POTASSIUM 4.3 3.8   < > 3.7   CR 0.90 0.86   < > 0.80    < > = values in this interval not displayed.        Diagnostics:  Labs pending at this time.  Results will be reviewed when available.   EKG: appears normal, NSR, unchanged from previous tracings, Left axis anterior fasicular block, old infarct and Nonspecific T wave abnormality    Lab Results   Component Value Date    A1C 7.3 09/16/2021     Lab Results   Component Value Date    WBC 5.9 09/16/2021    WBC 7.2 05/28/2021     Lab Results   Component Value Date    RBC 3.99 09/16/2021    RBC 3.96 05/28/2021     Lab Results   Component Value Date    HGB 10.5 09/16/2021    HGB 11.5 05/28/2021     Lab Results   Component Value Date    HCT 33.5 09/16/2021    " HCT 35.7 05/28/2021     No components found for: MCT  Lab Results   Component Value Date    MCV 84 09/16/2021    MCV 90 05/28/2021     Lab Results   Component Value Date    MCH 26.3 09/16/2021    MCH 29.0 05/28/2021     Lab Results   Component Value Date    MCHC 31.3 09/16/2021    MCHC 32.2 05/28/2021     Lab Results   Component Value Date    RDW 17.2 09/16/2021    RDW 13.2 05/28/2021     Lab Results   Component Value Date     09/16/2021     05/28/2021     Last Comprehensive Metabolic Panel:  Sodium   Date Value Ref Range Status   09/16/2021 135 133 - 144 mmol/L Final   06/03/2021 135 133 - 144 mmol/L Final     Potassium   Date Value Ref Range Status   09/16/2021 4.2 3.4 - 5.3 mmol/L Final   06/03/2021 4.3 3.4 - 5.3 mmol/L Final     Chloride   Date Value Ref Range Status   09/16/2021 99 94 - 109 mmol/L Final   06/03/2021 96 94 - 109 mmol/L Final     Carbon Dioxide   Date Value Ref Range Status   06/03/2021 33 (H) 20 - 32 mmol/L Final     Carbon Dioxide (CO2)   Date Value Ref Range Status   09/16/2021 35 (H) 20 - 32 mmol/L Final     Anion Gap   Date Value Ref Range Status   09/16/2021 1 (L) 3 - 14 mmol/L Final   06/03/2021 6 3 - 14 mmol/L Final     Glucose   Date Value Ref Range Status   09/16/2021 159 (H) 70 - 99 mg/dL Final   06/03/2021 199 (H) 70 - 99 mg/dL Final     Urea Nitrogen   Date Value Ref Range Status   09/16/2021 19 7 - 30 mg/dL Final   06/03/2021 14 7 - 30 mg/dL Final     Creatinine   Date Value Ref Range Status   09/16/2021 0.88 0.66 - 1.25 mg/dL Final   06/03/2021 0.90 0.66 - 1.25 mg/dL Final     GFR Estimate   Date Value Ref Range Status   09/16/2021 87 >60 mL/min/1.73m2 Final     Comment:     As of July 11, 2021, eGFR is calculated by the CKD-EPI creatinine equation, without race adjustment. eGFR can be influenced by muscle mass, exercise, and diet. The reported eGFR is an estimation only and is only applicable if the renal function is stable.   06/03/2021 86 >60 mL/min/[1.73_m2] Final      Comment:     Non  GFR Calc  Starting 12/18/2018, serum creatinine based estimated GFR (eGFR) will be   calculated using the Chronic Kidney Disease Epidemiology Collaboration   (CKD-EPI) equation.       Calcium   Date Value Ref Range Status   09/16/2021 8.4 (L) 8.5 - 10.1 mg/dL Final   06/03/2021 8.6 8.5 - 10.1 mg/dL Final         Revised Cardiac Risk Index (RCRI):  The patient has the following serious cardiovascular risks for perioperative complications:   - Congestive Heart Failure (pulmonary edema, PND, s3 zelalem, CXR with pulmonary congestion, basilar rales) = 1 point   - Diabetes Mellitus (on Insulin) = 1 point     RCRI Interpretation: 2 points: Class III (moderate risk - 6.6% complication rate)     Estimated Functional Capacity: [facility] Activity Status Index (DASI) score: 9.89     Signed Electronically by: Molly Mckeon NP  Copy of this evaluation report is provided to requesting physician.

## 2021-09-16 NOTE — PROGRESS NOTES
[facility]  [id number]  [facility] [address]  Phone: [phone]  Primary Provider: No Ref-Primary, Physician  Pre-op Performing Provider: MEREDITH RICH    PREOPERATIVE EVALUATION:  Today's date: 9/16/2021    Reilly Broja is a 71 year old male who presents for a preoperative evaluation.    Surgical Information:  Surgery/Procedure: Left charcot foot reconstruction  Surgery Location: [facility] [place]  Surgeon: Julius Campbell DO  Surgery Date: 9/17/2021  Time of Surgery: 1:00 pm   Where patient plans to recover: At home with family  Fax number for surgical facility: Note does not need to be faxed, will be available electronically in Epic.     Type of Anesthesia Anticipated: Choice    Assessment & Plan     The proposed surgical procedure is considered INTERMEDIATE risk.    Preop general physical exam  Patient has normal exam today.  Labs and EKG ordered.  Discussed medications and what to hold.    - Hemoglobin A1c; Future  - CBC with platelets; Future  - Basic metabolic panel  (Ca, Cl, CO2, Creat, Gluc, K, Na, BUN); Future    Type 2 diabetes mellitus with complication, with long-term current use of insulin (H)  Stable.  A1c ordered for evaluation prior to surgery that is pending.  - Hemoglobin A1c; Future    Essential hypertension  Stable.  Ordered BMP for evaluation prior to surgery that is pending.  - Basic metabolic panel  (Ca, Cl, CO2, Creat, Gluc, K, Na, BUN); Future    Chronic heart failure, unspecified heart failure type (H)  EKG is stable as compared to recent EKG in May.    Iron deficiency anemia, unspecified iron deficiency anemia type  Stable.  Taking iron supplement.  Unable to determine cause if chronic disease, diet or other due to not having access to patient's primary care medical records.  - CBC with platelets; Future    Risks and Recommendations:  The patient has the following additional risks and recommendations for  perioperative complications:  Diabetes:  - Patient is on insulin therapy; diabetic NPO guidelines provided and discussed.  Anemia/Bleeding/Clotting:    - Taking iron, CBC ordered prior to surgery    Medication Instructions:   - ACE/ARB: May be continued on the day of surgery.    - Diuretics: HOLD on the day of surgery.   - Statins: Continue taking on the day of surgery.    - Long acting insulin (e.g. glargine, detemir): Take 80% of the usual evening or morning dose before surgery.{    RECOMMENDATION:  APPROVAL GIVEN to proceed with proposed procedure without further diagnostic evaluation.    Subjective     HPI related to upcoming procedure: Charcot in left foot that now needs removal of hardware and possibly reconstruct the foot.    Preop Questions 9/16/2021   1. Have you ever had a heart attack or stroke? No   2. Have you ever had surgery on your heart or blood vessels, such as a stent placement, a coronary artery bypass, or surgery on an artery in your head, neck, heart, or legs? No   3. Do you have chest pain with activity? No   4. Do you have a history of  heart failure? YES - Congestive Heart Failure, Systolic   5. Do you currently have a cold, bronchitis or symptoms of other infection? No   6. Do you have a cough, shortness of breath, or wheezing? No   7. Do you or anyone in your family have previous history of blood clots? No   8. Do you or does anyone in your family have a serious bleeding problem such as prolonged bleeding following surgeries or cuts? No   9. Have you ever had problems with anemia or been told to take iron pills? Yes - Unknown cause, taking iron supplements   10. Have you had any abnormal blood loss such as black, tarry or bloody stools? No   11. Have you ever had a blood transfusion? No   12. Are you willing to have a blood transfusion if it is medically needed before, during, or after your surgery? Yes   13. Have you or any of your relatives ever had problems with anesthesia? No   14. Do  you have sleep apnea, excessive snoring or daytime drowsiness? No   15. Do you have any artifical heart valves or other implanted medical devices like a pacemaker, defibrillator, or continuous glucose monitor? No   16. Do you have artificial joints? No   17. Are you allergic to latex? No       Health Care Directive:  Patient does not have a Health Care Directive or Living Will: Discussed advance care planning with patient; information given to patient to review.    Preoperative Review of :   reviewed - no record of controlled substances prescribed.    Status of Chronic Conditions:  ANEMIA - Patient has a recent history of moderate-severe anemia, which has not been symptomatic. Work up to date has revealed unknown (unable to retrieve health record at PCP). Treatment has been Iron supplements.     CHF - Patient has a longstanding history of moderate-severe CHF. Exacerbating conditions include hypertension. Currently the patient's condition is same. Current treatment regimen includes Angiotensin 2 receptor blocker and diuretic. The patient denies chest pain, edema, orthopnea, SOB or recent weight gain. Last Echocardiogram 11/19, EKG 5/2021.     DIABETES - Patient has a longstanding history of DiabetesType Type II . Patient is being treated with diet, oral agents and insulin injections and denies significant side effects. Control has been good. Complicating factors include but are not limited to: hypertension, hyperlipidemia, retinopathy and neuropathy.     HYPERLIPIDEMIA - Patient has a long history of significant Hyperlipidemia requiring medication for treatment with recent good control. Patient reports no problems or side effects with the medication.     HYPERTENSION - Patient has longstanding history of HTN , currently denies any symptoms referable to elevated blood pressure. Specifically denies chest pain, palpitations, dyspnea, orthopnea, PND or peripheral edema. Blood pressure readings have been in normal  range. Current medication regimen is as listed below. Patient denies any side effects of medication.       Review of Systems  CONSTITUTIONAL: NEGATIVE for fever, chills, change in weight  INTEGUMENTARY/SKIN: NEGATIVE for worrisome rashes, moles or lesions  EYES: POSITIVE for floater in right eye, hx of retinopathy  ENT/MOUTH: NEGATIVE for ear, mouth and throat problems  RESP: NEGATIVE for significant cough or SOB  CV: NEGATIVE for chest pain, palpitations or peripheral edema  CV: POSITIVE for left leg swelling and NEGATIVE for chest pain/chest pressure, dyspnea on exertion and HX HTN  GI: NEGATIVE for nausea, abdominal pain, heartburn, or change in bowel habits  : NEGATIVE for frequency, dysuria, or hematuria  MUSCULOSKELETAL:POSITIVE  for pain in surgical area of left foot with charcot  NEURO: NEGATIVE for weakness, dizziness or paresthesias  ENDOCRINE: NEGATIVE for temperature intolerance, skin/hair changes  HEME: NEGATIVE for bleeding problems  PSYCHIATRIC: NEGATIVE for changes in mood or affect    Patient Active Problem List    Diagnosis Date Noted     Iron deficiency anemia, unspecified 09/16/2021     Priority: Medium     Vitreous degeneration of right eye 09/16/2021     Priority: Medium     Hx of gout 09/16/2021     Priority: Medium     Pain in left ankle 09/16/2021     Priority: Medium     Personal history of colonic polyps 09/16/2021     Priority: Medium     Astigmatism 09/16/2021     Priority: Medium     Blepharochalasis 09/16/2021     Priority: Medium     Cataract 09/16/2021     Priority: Medium     Nonalcoholic fatty liver disease 09/16/2021     Priority: Medium     Overweight 09/16/2021     Priority: Medium     Presbyopia 09/16/2021     Priority: Medium     Pure hypercholesterolemia 09/16/2021     Priority: Medium     Chronic heart failure, unspecified heart failure type (H) 06/03/2021     Priority: Medium     Essential hypertension 11/16/2019     Priority: Medium     Type 2 diabetes mellitus with  complication, with long-term current use of insulin (H) 11/16/2019     Priority: Medium     S/P spinal surgery 11/16/2019     Priority: Medium     Lumbar radiculopathy 02/06/2017     Priority: Medium      Past Medical History:   Diagnosis Date     Congestive heart failure (H)      Diabetes (H)      Hypertension      Iron deficiency anemia secondary to inadequate dietary iron intake      Past Surgical History:   Procedure Laterality Date     ARTHROSCOPY KNEE Left      C LUMBAR SPINE FUSN,POST INTRBDY Right 02/06/2017    Procedure: RIGHT L5-S1 TRANSFORAMINAL LUMBAR INTERBODY FUSION;  Surgeon: Rajiv Zavala MD;  Location: [facility];  Service: Spine     OTHER SURGICAL HISTORY Left     surgery for charcot of left foot     Current Outpatient Medications   Medication Sig Dispense Refill     atorvastatin (LIPITOR) 80 MG tablet TAKE ONE-HALF TABLET BY MOUTH AT BEDTIME FOR CHOLESTEROL       furosemide (LASIX) 20 MG tablet Take 1 tablet (20 mg) by mouth 2 times daily 14 tablet 0     gabapentin (NEURONTIN) 300 MG capsule TAKE 1 CAPSULE BY MOUTH AT BEDTIME       glucose-vitamin C 4-6 GM-MG CHEW CHEW FOUR TABLETS BY MOUTH  AS NEEDED FOR LOW BLOOD SUGAR       insulin glargine (LANTUS VIAL) 100 UNIT/ML vial Inject 34 Units Subcutaneous 2 times daily       losartan (COZAAR) 50 MG tablet Take 50 mg by mouth At Bedtime       metFORMIN (GLUCOPHAGE) 1000 MG tablet Take 1,000 mg by mouth 2 times daily (with meals)       multivitamin (THERMEMS) TABS Take 1 tablet by mouth daily       Semaglutide,0.25 or 0.5MG/DOS, 2 MG/1.5ML SOPN INJECT 0.5MG UNDER THE SKIN ONCE A WEEK FOR DIABETES -MULTIPLE DOSES PER PEN -NEEDLES ARE INCLUDED IN BOX         Allergies   Allergen Reactions     Simvastatin Muscle Pain (Myalgia)        Social History     Tobacco Use     Smoking status: Never Smoker     Smokeless tobacco: Never Used   Substance Use Topics     Alcohol use: Not Currently     Family History   Problem Relation Age of Onset      "Diabetes Mother      History   Drug Use Unknown         Objective     /76   Pulse 82   Temp 98  F (36.7  C) (Tympanic)   Resp 16   Ht 1.88 m (6' 2\")   Wt 120.2 kg (265 lb)   SpO2 95%   BMI 34.02 kg/m      Physical Exam    GENERAL APPEARANCE: healthy, alert and no distress     EYES: EOMI,  PERRL     HENT: ear canals and TM's normal and nose and mouth without ulcers or lesions     NECK: no adenopathy, no asymmetry, masses, or scars and thyroid normal to palpation     RESP: lungs clear to auscultation - no rales, rhonchi or wheezes     CV: regular rates and rhythm, normal S1 S2, no S3 or S4 and no murmur, click or rub     ABDOMEN:  soft, nontender, no HSM or masses and bowel sounds normal     MS: extremities normal- no gross deformities noted, no evidence of inflammation in joints, FROM in all extremities.     SKIN: no suspicious lesions or rashes     NEURO: Normal strength and tone, sensory exam grossly normal, mentation intact and speech normal     PSYCH: mentation appears normal. and affect normal/bright     LYMPHATICS: No cervical adenopathy    Recent Labs   Lab Test 06/03/21  1347 05/28/21  2317 11/16/19  1004 11/16/19  1004   HGB  --  11.5*  --  14.2   PLT  --  360  --  203    136   < > 142   POTASSIUM 4.3 3.8   < > 3.7   CR 0.90 0.86   < > 0.80    < > = values in this interval not displayed.        Diagnostics:  Labs pending at this time.  Results will be reviewed when available.   EKG: appears normal, NSR, unchanged from previous tracings, Left axis anterior fasicular block, old infarct and Nonspecific T wave abnormality    Lab Results   Component Value Date    A1C 7.3 09/16/2021     Lab Results   Component Value Date    WBC 5.9 09/16/2021    WBC 7.2 05/28/2021     Lab Results   Component Value Date    RBC 3.99 09/16/2021    RBC 3.96 05/28/2021     Lab Results   Component Value Date    HGB 10.5 09/16/2021    HGB 11.5 05/28/2021     Lab Results   Component Value Date    HCT 33.5 09/16/2021    " HCT 35.7 05/28/2021     No components found for: MCT  Lab Results   Component Value Date    MCV 84 09/16/2021    MCV 90 05/28/2021     Lab Results   Component Value Date    MCH 26.3 09/16/2021    MCH 29.0 05/28/2021     Lab Results   Component Value Date    MCHC 31.3 09/16/2021    MCHC 32.2 05/28/2021     Lab Results   Component Value Date    RDW 17.2 09/16/2021    RDW 13.2 05/28/2021     Lab Results   Component Value Date     09/16/2021     05/28/2021     Last Comprehensive Metabolic Panel:  Sodium   Date Value Ref Range Status   09/16/2021 135 133 - 144 mmol/L Final   06/03/2021 135 133 - 144 mmol/L Final     Potassium   Date Value Ref Range Status   09/16/2021 4.2 3.4 - 5.3 mmol/L Final   06/03/2021 4.3 3.4 - 5.3 mmol/L Final     Chloride   Date Value Ref Range Status   09/16/2021 99 94 - 109 mmol/L Final   06/03/2021 96 94 - 109 mmol/L Final     Carbon Dioxide   Date Value Ref Range Status   06/03/2021 33 (H) 20 - 32 mmol/L Final     Carbon Dioxide (CO2)   Date Value Ref Range Status   09/16/2021 35 (H) 20 - 32 mmol/L Final     Anion Gap   Date Value Ref Range Status   09/16/2021 1 (L) 3 - 14 mmol/L Final   06/03/2021 6 3 - 14 mmol/L Final     Glucose   Date Value Ref Range Status   09/16/2021 159 (H) 70 - 99 mg/dL Final   06/03/2021 199 (H) 70 - 99 mg/dL Final     Urea Nitrogen   Date Value Ref Range Status   09/16/2021 19 7 - 30 mg/dL Final   06/03/2021 14 7 - 30 mg/dL Final     Creatinine   Date Value Ref Range Status   09/16/2021 0.88 0.66 - 1.25 mg/dL Final   06/03/2021 0.90 0.66 - 1.25 mg/dL Final     GFR Estimate   Date Value Ref Range Status   09/16/2021 87 >60 mL/min/1.73m2 Final     Comment:     As of July 11, 2021, eGFR is calculated by the CKD-EPI creatinine equation, without race adjustment. eGFR can be influenced by muscle mass, exercise, and diet. The reported eGFR is an estimation only and is only applicable if the renal function is stable.   06/03/2021 86 >60 mL/min/[1.73_m2] Final      Comment:     Non  GFR Calc  Starting 12/18/2018, serum creatinine based estimated GFR (eGFR) will be   calculated using the Chronic Kidney Disease Epidemiology Collaboration   (CKD-EPI) equation.       Calcium   Date Value Ref Range Status   09/16/2021 8.4 (L) 8.5 - 10.1 mg/dL Final   06/03/2021 8.6 8.5 - 10.1 mg/dL Final         Revised Cardiac Risk Index (RCRI):  The patient has the following serious cardiovascular risks for perioperative complications:   - Congestive Heart Failure (pulmonary edema, PND, s3 zelalem, CXR with pulmonary congestion, basilar rales) = 1 point   - Diabetes Mellitus (on Insulin) = 1 point     RCRI Interpretation: 2 points: Class III (moderate risk - 6.6% complication rate)     Estimated Functional Capacity: Duke Activity Status Index (DASI) score: 9.89     Signed Electronically by: Molly Mckeon NP  Copy of this evaluation report is provided to requesting physician.

## 2021-09-17 ENCOUNTER — ANCILLARY PROCEDURE (OUTPATIENT)
Dept: ULTRASOUND IMAGING | Facility: CLINIC | Age: 71
DRG: 496 | End: 2021-09-17
Attending: ANESTHESIOLOGY
Payer: MEDICARE

## 2021-09-17 ENCOUNTER — HOSPITAL ENCOUNTER (INPATIENT)
Facility: CLINIC | Age: 71
LOS: 1 days | Discharge: HOME HEALTH | DRG: 496 | End: 2021-09-20
Attending: ORTHOPAEDIC SURGERY | Admitting: ORTHOPAEDIC SURGERY
Payer: MEDICARE

## 2021-09-17 ENCOUNTER — ANESTHESIA EVENT (OUTPATIENT)
Dept: SURGERY | Facility: CLINIC | Age: 71
DRG: 496 | End: 2021-09-17
Payer: MEDICARE

## 2021-09-17 ENCOUNTER — TRANSFERRED RECORDS (OUTPATIENT)
Dept: HEALTH INFORMATION MANAGEMENT | Facility: CLINIC | Age: 71
End: 2021-09-17

## 2021-09-17 ENCOUNTER — ANESTHESIA (OUTPATIENT)
Dept: SURGERY | Facility: CLINIC | Age: 71
DRG: 496 | End: 2021-09-17
Payer: MEDICARE

## 2021-09-17 ENCOUNTER — APPOINTMENT (OUTPATIENT)
Dept: RADIOLOGY | Facility: CLINIC | Age: 71
DRG: 496 | End: 2021-09-17
Attending: ORTHOPAEDIC SURGERY
Payer: MEDICARE

## 2021-09-17 DIAGNOSIS — Z98.890 STATUS POST DEBRIDEMENT: ICD-10-CM

## 2021-09-17 DIAGNOSIS — M25.572 CHRONIC PAIN OF LEFT ANKLE: Primary | ICD-10-CM

## 2021-09-17 DIAGNOSIS — M14.672 CHARCOT'S JOINT OF LEFT FOOT: ICD-10-CM

## 2021-09-17 DIAGNOSIS — M86.672 OTHER CHRONIC OSTEOMYELITIS OF LEFT FOOT (H): ICD-10-CM

## 2021-09-17 DIAGNOSIS — G89.29 CHRONIC PAIN OF LEFT ANKLE: Primary | ICD-10-CM

## 2021-09-17 PROBLEM — I10 ESSENTIAL HYPERTENSION: Status: ACTIVE | Noted: 2019-11-16

## 2021-09-17 PROBLEM — E11.8 TYPE 2 DIABETES MELLITUS WITH COMPLICATION, WITH LONG-TERM CURRENT USE OF INSULIN (H): Status: ACTIVE | Noted: 2019-11-16

## 2021-09-17 PROBLEM — Z79.4 TYPE 2 DIABETES MELLITUS WITH COMPLICATION, WITH LONG-TERM CURRENT USE OF INSULIN (H): Status: ACTIVE | Noted: 2019-11-16

## 2021-09-17 LAB
GLUCOSE BLDC GLUCOMTR-MCNC: 101 MG/DL (ref 70–99)
GLUCOSE BLDC GLUCOMTR-MCNC: 119 MG/DL (ref 70–99)
GLUCOSE BLDC GLUCOMTR-MCNC: 200 MG/DL (ref 70–99)
GLUCOSE BLDC GLUCOMTR-MCNC: 87 MG/DL (ref 70–99)
GRAM STAIN RESULT: ABNORMAL

## 2021-09-17 PROCEDURE — 272N000001 HC OR GENERAL SUPPLY STERILE: Performed by: ORTHOPAEDIC SURGERY

## 2021-09-17 PROCEDURE — 710N000010 HC RECOVERY PHASE 1, LEVEL 2, PER MIN: Performed by: ORTHOPAEDIC SURGERY

## 2021-09-17 PROCEDURE — 250N000025 HC SEVOFLURANE, PER MIN: Performed by: ORTHOPAEDIC SURGERY

## 2021-09-17 PROCEDURE — 250N000011 HC RX IP 250 OP 636: Performed by: PHYSICIAN ASSISTANT

## 2021-09-17 PROCEDURE — 250N000011 HC RX IP 250 OP 636: Performed by: ORTHOPAEDIC SURGERY

## 2021-09-17 PROCEDURE — 250N000011 HC RX IP 250 OP 636: Performed by: NURSE ANESTHETIST, CERTIFIED REGISTERED

## 2021-09-17 PROCEDURE — 250N000013 HC RX MED GY IP 250 OP 250 PS 637: Performed by: HOSPITALIST

## 2021-09-17 PROCEDURE — 99204 OFFICE O/P NEW MOD 45 MIN: CPT | Performed by: HOSPITALIST

## 2021-09-17 PROCEDURE — 250N000011 HC RX IP 250 OP 636: Performed by: ANESTHESIOLOGY

## 2021-09-17 PROCEDURE — 96372 THER/PROPH/DIAG INJ SC/IM: CPT | Performed by: HOSPITALIST

## 2021-09-17 PROCEDURE — 99207 PR CDG-CODE CATEGORY CHANGED: CPT | Performed by: HOSPITALIST

## 2021-09-17 PROCEDURE — 87075 CULTR BACTERIA EXCEPT BLOOD: CPT | Performed by: ORTHOPAEDIC SURGERY

## 2021-09-17 PROCEDURE — 250N000013 HC RX MED GY IP 250 OP 250 PS 637: Performed by: ORTHOPAEDIC SURGERY

## 2021-09-17 PROCEDURE — 360N000082 HC SURGERY LEVEL 2 W/ FLUORO, PER MIN: Performed by: ORTHOPAEDIC SURGERY

## 2021-09-17 PROCEDURE — 258N000003 HC RX IP 258 OP 636: Performed by: ORTHOPAEDIC SURGERY

## 2021-09-17 PROCEDURE — 250N000009 HC RX 250: Performed by: NURSE ANESTHETIST, CERTIFIED REGISTERED

## 2021-09-17 PROCEDURE — 87186 SC STD MICRODIL/AGAR DIL: CPT | Performed by: ORTHOPAEDIC SURGERY

## 2021-09-17 PROCEDURE — 360N000083 HC SURGERY LEVEL 3 W/ FLUORO, PER MIN: Performed by: ORTHOPAEDIC SURGERY

## 2021-09-17 PROCEDURE — 0QPM04Z REMOVAL OF INTERNAL FIXATION DEVICE FROM LEFT TARSAL, OPEN APPROACH: ICD-10-PCS | Performed by: ORTHOPAEDIC SURGERY

## 2021-09-17 PROCEDURE — 250N000013 HC RX MED GY IP 250 OP 250 PS 637: Performed by: NURSE ANESTHETIST, CERTIFIED REGISTERED

## 2021-09-17 PROCEDURE — 258N000003 HC RX IP 258 OP 636: Performed by: NURSE ANESTHETIST, CERTIFIED REGISTERED

## 2021-09-17 PROCEDURE — 999N000141 HC STATISTIC PRE-PROCEDURE NURSING ASSESSMENT: Performed by: ORTHOPAEDIC SURGERY

## 2021-09-17 PROCEDURE — 250N000012 HC RX MED GY IP 250 OP 636 PS 637: Performed by: HOSPITALIST

## 2021-09-17 PROCEDURE — 999N000182 XR SURGERY CARM FLUORO GREATER THAN 5 MIN: Mod: TC

## 2021-09-17 PROCEDURE — 258N000001 HC RX 258: Performed by: ORTHOPAEDIC SURGERY

## 2021-09-17 PROCEDURE — 370N000017 HC ANESTHESIA TECHNICAL FEE, PER MIN: Performed by: ORTHOPAEDIC SURGERY

## 2021-09-17 PROCEDURE — 87077 CULTURE AEROBIC IDENTIFY: CPT | Performed by: ORTHOPAEDIC SURGERY

## 2021-09-17 PROCEDURE — 258N000003 HC RX IP 258 OP 636: Performed by: ANESTHESIOLOGY

## 2021-09-17 PROCEDURE — 87205 SMEAR GRAM STAIN: CPT | Performed by: ORTHOPAEDIC SURGERY

## 2021-09-17 PROCEDURE — 250N000009 HC RX 250: Performed by: ANESTHESIOLOGY

## 2021-09-17 PROCEDURE — 250N000013 HC RX MED GY IP 250 OP 250 PS 637: Performed by: ANESTHESIOLOGY

## 2021-09-17 PROCEDURE — 0QDP0ZZ EXTRACTION OF LEFT METATARSAL, OPEN APPROACH: ICD-10-PCS | Performed by: ORTHOPAEDIC SURGERY

## 2021-09-17 RX ORDER — ONDANSETRON 4 MG/1
4 TABLET, ORALLY DISINTEGRATING ORAL EVERY 6 HOURS PRN
Status: DISCONTINUED | OUTPATIENT
Start: 2021-09-17 | End: 2021-09-20 | Stop reason: HOSPADM

## 2021-09-17 RX ORDER — OXYCODONE HYDROCHLORIDE 5 MG/1
10 TABLET ORAL EVERY 4 HOURS PRN
Status: DISCONTINUED | OUTPATIENT
Start: 2021-09-17 | End: 2021-09-20 | Stop reason: HOSPADM

## 2021-09-17 RX ORDER — CEFAZOLIN SODIUM IN 0.9 % NACL 3 G/100 ML
3 INTRAVENOUS SOLUTION, PIGGYBACK (ML) INTRAVENOUS
Status: COMPLETED | OUTPATIENT
Start: 2021-09-17 | End: 2021-09-17

## 2021-09-17 RX ORDER — HALOPERIDOL 5 MG/ML
1 INJECTION INTRAMUSCULAR
Status: DISCONTINUED | OUTPATIENT
Start: 2021-09-17 | End: 2021-09-17 | Stop reason: HOSPADM

## 2021-09-17 RX ORDER — LIDOCAINE 40 MG/G
CREAM TOPICAL
Status: DISCONTINUED | OUTPATIENT
Start: 2021-09-17 | End: 2021-09-17 | Stop reason: HOSPADM

## 2021-09-17 RX ORDER — GLYCOPYRROLATE 0.2 MG/ML
INJECTION, SOLUTION INTRAMUSCULAR; INTRAVENOUS PRN
Status: DISCONTINUED | OUTPATIENT
Start: 2021-09-17 | End: 2021-09-17

## 2021-09-17 RX ORDER — FENTANYL CITRATE 50 UG/ML
50 INJECTION, SOLUTION INTRAMUSCULAR; INTRAVENOUS EVERY 5 MIN PRN
Status: DISCONTINUED | OUTPATIENT
Start: 2021-09-17 | End: 2021-09-17 | Stop reason: HOSPADM

## 2021-09-17 RX ORDER — NICOTINE POLACRILEX 4 MG
15-30 LOZENGE BUCCAL
Status: DISCONTINUED | OUTPATIENT
Start: 2021-09-17 | End: 2021-09-20 | Stop reason: HOSPADM

## 2021-09-17 RX ORDER — ACETAMINOPHEN 500 MG
1000 TABLET ORAL EVERY 8 HOURS
Status: COMPLETED | OUTPATIENT
Start: 2021-09-17 | End: 2021-09-20

## 2021-09-17 RX ORDER — OXYCODONE HYDROCHLORIDE 5 MG/1
5 TABLET ORAL EVERY 4 HOURS PRN
Status: DISCONTINUED | OUTPATIENT
Start: 2021-09-17 | End: 2021-09-20 | Stop reason: HOSPADM

## 2021-09-17 RX ORDER — AMOXICILLIN 250 MG
1 CAPSULE ORAL 2 TIMES DAILY
Status: DISCONTINUED | OUTPATIENT
Start: 2021-09-17 | End: 2021-09-20 | Stop reason: HOSPADM

## 2021-09-17 RX ORDER — BUPIVACAINE HYDROCHLORIDE 5 MG/ML
INJECTION, SOLUTION EPIDURAL; INTRACAUDAL PRN
Status: DISCONTINUED | OUTPATIENT
Start: 2021-09-17 | End: 2021-09-17

## 2021-09-17 RX ORDER — SODIUM CHLORIDE, SODIUM LACTATE, POTASSIUM CHLORIDE, CALCIUM CHLORIDE 600; 310; 30; 20 MG/100ML; MG/100ML; MG/100ML; MG/100ML
INJECTION, SOLUTION INTRAVENOUS CONTINUOUS
Status: DISCONTINUED | OUTPATIENT
Start: 2021-09-17 | End: 2021-09-20 | Stop reason: HOSPADM

## 2021-09-17 RX ORDER — LIDOCAINE 40 MG/G
CREAM TOPICAL
Status: DISCONTINUED | OUTPATIENT
Start: 2021-09-17 | End: 2021-09-20 | Stop reason: HOSPADM

## 2021-09-17 RX ORDER — PIPERACILLIN SODIUM, TAZOBACTAM SODIUM 3; .375 G/15ML; G/15ML
3.38 INJECTION, POWDER, LYOPHILIZED, FOR SOLUTION INTRAVENOUS EVERY 8 HOURS
Status: DISCONTINUED | OUTPATIENT
Start: 2021-09-17 | End: 2021-09-20

## 2021-09-17 RX ORDER — HYDROMORPHONE HCL IN WATER/PF 6 MG/30 ML
0.4 PATIENT CONTROLLED ANALGESIA SYRINGE INTRAVENOUS EVERY 5 MIN PRN
Status: DISCONTINUED | OUTPATIENT
Start: 2021-09-17 | End: 2021-09-17 | Stop reason: HOSPADM

## 2021-09-17 RX ORDER — MEPERIDINE HYDROCHLORIDE 25 MG/ML
12.5 INJECTION INTRAMUSCULAR; INTRAVENOUS; SUBCUTANEOUS
Status: DISCONTINUED | OUTPATIENT
Start: 2021-09-17 | End: 2021-09-17 | Stop reason: HOSPADM

## 2021-09-17 RX ORDER — ACETAMINOPHEN 325 MG/1
650 TABLET ORAL EVERY 4 HOURS PRN
Status: DISCONTINUED | OUTPATIENT
Start: 2021-09-20 | End: 2021-09-20 | Stop reason: HOSPADM

## 2021-09-17 RX ORDER — ONDANSETRON 2 MG/ML
4 INJECTION INTRAMUSCULAR; INTRAVENOUS EVERY 6 HOURS PRN
Status: DISCONTINUED | OUTPATIENT
Start: 2021-09-17 | End: 2021-09-20 | Stop reason: HOSPADM

## 2021-09-17 RX ORDER — ALBUTEROL SULFATE 90 UG/1
AEROSOL, METERED RESPIRATORY (INHALATION) PRN
Status: DISCONTINUED | OUTPATIENT
Start: 2021-09-17 | End: 2021-09-17

## 2021-09-17 RX ORDER — NALOXONE HYDROCHLORIDE 0.4 MG/ML
0.4 INJECTION, SOLUTION INTRAMUSCULAR; INTRAVENOUS; SUBCUTANEOUS
Status: DISCONTINUED | OUTPATIENT
Start: 2021-09-17 | End: 2021-09-20 | Stop reason: HOSPADM

## 2021-09-17 RX ORDER — BUPIVACAINE HYDROCHLORIDE 5 MG/ML
INJECTION, SOLUTION EPIDURAL; INTRACAUDAL
Status: DISCONTINUED | OUTPATIENT
Start: 2021-09-17 | End: 2021-09-17

## 2021-09-17 RX ORDER — FUROSEMIDE 20 MG
20 TABLET ORAL
Status: DISCONTINUED | OUTPATIENT
Start: 2021-09-17 | End: 2021-09-20 | Stop reason: HOSPADM

## 2021-09-17 RX ORDER — ONDANSETRON 2 MG/ML
4 INJECTION INTRAMUSCULAR; INTRAVENOUS EVERY 30 MIN PRN
Status: DISCONTINUED | OUTPATIENT
Start: 2021-09-17 | End: 2021-09-17 | Stop reason: HOSPADM

## 2021-09-17 RX ORDER — MAGNESIUM SULFATE 4 G/50ML
4 INJECTION INTRAVENOUS ONCE
Status: COMPLETED | OUTPATIENT
Start: 2021-09-17 | End: 2021-09-17

## 2021-09-17 RX ORDER — ACETAMINOPHEN 325 MG/1
975 TABLET ORAL ONCE
Status: COMPLETED | OUTPATIENT
Start: 2021-09-17 | End: 2021-09-17

## 2021-09-17 RX ORDER — BISACODYL 10 MG
10 SUPPOSITORY, RECTAL RECTAL DAILY PRN
Status: DISCONTINUED | OUTPATIENT
Start: 2021-09-17 | End: 2021-09-20 | Stop reason: HOSPADM

## 2021-09-17 RX ORDER — POLYETHYLENE GLYCOL 3350 17 G/17G
17 POWDER, FOR SOLUTION ORAL DAILY
Status: DISCONTINUED | OUTPATIENT
Start: 2021-09-18 | End: 2021-09-20 | Stop reason: HOSPADM

## 2021-09-17 RX ORDER — HYDROMORPHONE HCL IN WATER/PF 6 MG/30 ML
0.4 PATIENT CONTROLLED ANALGESIA SYRINGE INTRAVENOUS
Status: DISCONTINUED | OUTPATIENT
Start: 2021-09-17 | End: 2021-09-20 | Stop reason: HOSPADM

## 2021-09-17 RX ORDER — HYDROMORPHONE HCL IN WATER/PF 6 MG/30 ML
0.2 PATIENT CONTROLLED ANALGESIA SYRINGE INTRAVENOUS
Status: DISCONTINUED | OUTPATIENT
Start: 2021-09-17 | End: 2021-09-20 | Stop reason: HOSPADM

## 2021-09-17 RX ORDER — LOSARTAN POTASSIUM 50 MG/1
50 TABLET ORAL AT BEDTIME
Status: DISCONTINUED | OUTPATIENT
Start: 2021-09-17 | End: 2021-09-20 | Stop reason: HOSPADM

## 2021-09-17 RX ORDER — ATORVASTATIN CALCIUM 40 MG/1
40 TABLET, FILM COATED ORAL AT BEDTIME
Status: DISCONTINUED | OUTPATIENT
Start: 2021-09-17 | End: 2021-09-20 | Stop reason: HOSPADM

## 2021-09-17 RX ORDER — NALOXONE HYDROCHLORIDE 0.4 MG/ML
0.2 INJECTION, SOLUTION INTRAMUSCULAR; INTRAVENOUS; SUBCUTANEOUS
Status: DISCONTINUED | OUTPATIENT
Start: 2021-09-17 | End: 2021-09-20 | Stop reason: HOSPADM

## 2021-09-17 RX ORDER — SODIUM CHLORIDE, SODIUM LACTATE, POTASSIUM CHLORIDE, CALCIUM CHLORIDE 600; 310; 30; 20 MG/100ML; MG/100ML; MG/100ML; MG/100ML
INJECTION, SOLUTION INTRAVENOUS CONTINUOUS
Status: DISCONTINUED | OUTPATIENT
Start: 2021-09-17 | End: 2021-09-17 | Stop reason: HOSPADM

## 2021-09-17 RX ORDER — PROCHLORPERAZINE MALEATE 5 MG
5 TABLET ORAL EVERY 6 HOURS PRN
Status: DISCONTINUED | OUTPATIENT
Start: 2021-09-17 | End: 2021-09-20 | Stop reason: HOSPADM

## 2021-09-17 RX ORDER — CEFAZOLIN SODIUM IN 0.9 % NACL 3 G/100 ML
3 INTRAVENOUS SOLUTION, PIGGYBACK (ML) INTRAVENOUS SEE ADMIN INSTRUCTIONS
Status: DISCONTINUED | OUTPATIENT
Start: 2021-09-17 | End: 2021-09-17 | Stop reason: HOSPADM

## 2021-09-17 RX ORDER — ASPIRIN 81 MG/1
81 TABLET ORAL 2 TIMES DAILY
Status: DISCONTINUED | OUTPATIENT
Start: 2021-09-17 | End: 2021-09-20 | Stop reason: HOSPADM

## 2021-09-17 RX ORDER — EPHEDRINE SULFATE 50 MG/ML
INJECTION, SOLUTION INTRAMUSCULAR; INTRAVENOUS; SUBCUTANEOUS PRN
Status: DISCONTINUED | OUTPATIENT
Start: 2021-09-17 | End: 2021-09-17

## 2021-09-17 RX ORDER — LABETALOL HYDROCHLORIDE 5 MG/ML
10 INJECTION, SOLUTION INTRAVENOUS
Status: DISCONTINUED | OUTPATIENT
Start: 2021-09-17 | End: 2021-09-17 | Stop reason: HOSPADM

## 2021-09-17 RX ORDER — FENTANYL CITRATE 50 UG/ML
50 INJECTION, SOLUTION INTRAMUSCULAR; INTRAVENOUS
Status: DISCONTINUED | OUTPATIENT
Start: 2021-09-17 | End: 2021-09-17 | Stop reason: HOSPADM

## 2021-09-17 RX ORDER — ONDANSETRON 4 MG/1
4 TABLET, ORALLY DISINTEGRATING ORAL EVERY 30 MIN PRN
Status: DISCONTINUED | OUTPATIENT
Start: 2021-09-17 | End: 2021-09-17 | Stop reason: HOSPADM

## 2021-09-17 RX ORDER — KETAMINE HYDROCHLORIDE 50 MG/ML
INJECTION, SOLUTION INTRAMUSCULAR; INTRAVENOUS PRN
Status: DISCONTINUED | OUTPATIENT
Start: 2021-09-17 | End: 2021-09-17

## 2021-09-17 RX ORDER — DEXTROSE MONOHYDRATE 25 G/50ML
25-50 INJECTION, SOLUTION INTRAVENOUS
Status: DISCONTINUED | OUTPATIENT
Start: 2021-09-17 | End: 2021-09-20 | Stop reason: HOSPADM

## 2021-09-17 RX ORDER — FENTANYL CITRATE 50 UG/ML
50 INJECTION, SOLUTION INTRAMUSCULAR; INTRAVENOUS
Status: COMPLETED | OUTPATIENT
Start: 2021-09-17 | End: 2021-09-17

## 2021-09-17 RX ORDER — FENTANYL CITRATE 50 UG/ML
INJECTION, SOLUTION INTRAMUSCULAR; INTRAVENOUS PRN
Status: DISCONTINUED | OUTPATIENT
Start: 2021-09-17 | End: 2021-09-17

## 2021-09-17 RX ORDER — OXYCODONE HYDROCHLORIDE 5 MG/1
5 TABLET ORAL EVERY 4 HOURS PRN
Status: DISCONTINUED | OUTPATIENT
Start: 2021-09-17 | End: 2021-09-17

## 2021-09-17 RX ADMIN — PIPERACILLIN AND TAZOBACTAM 3.38 G: 3; .375 INJECTION, POWDER, LYOPHILIZED, FOR SOLUTION INTRAVENOUS at 20:26

## 2021-09-17 RX ADMIN — BUPIVACAINE HYDROCHLORIDE 20 ML: 5 INJECTION, SOLUTION EPIDURAL; INTRACAUDAL; PERINEURAL at 13:23

## 2021-09-17 RX ADMIN — KETAMINE HYDROCHLORIDE 25 MG: 50 INJECTION, SOLUTION INTRAMUSCULAR; INTRAVENOUS at 14:20

## 2021-09-17 RX ADMIN — SODIUM CHLORIDE, POTASSIUM CHLORIDE, SODIUM LACTATE AND CALCIUM CHLORIDE: 600; 310; 30; 20 INJECTION, SOLUTION INTRAVENOUS at 15:44

## 2021-09-17 RX ADMIN — VANCOMYCIN HYDROCHLORIDE 1750 MG: 5 INJECTION, POWDER, LYOPHILIZED, FOR SOLUTION INTRAVENOUS at 17:51

## 2021-09-17 RX ADMIN — SUGAMMADEX 200 MG: 100 INJECTION, SOLUTION INTRAVENOUS at 15:34

## 2021-09-17 RX ADMIN — Medication 10 MG: at 14:45

## 2021-09-17 RX ADMIN — FENTANYL CITRATE 50 MCG: 50 INJECTION, SOLUTION INTRAMUSCULAR; INTRAVENOUS at 13:16

## 2021-09-17 RX ADMIN — MIDAZOLAM HYDROCHLORIDE 1 MG: 1 INJECTION, SOLUTION INTRAMUSCULAR; INTRAVENOUS at 13:15

## 2021-09-17 RX ADMIN — Medication 5 MG: at 14:49

## 2021-09-17 RX ADMIN — FENTANYL CITRATE 50 MCG: 50 INJECTION, SOLUTION INTRAMUSCULAR; INTRAVENOUS at 13:18

## 2021-09-17 RX ADMIN — PHENYLEPHRINE HYDROCHLORIDE 100 MCG: 10 INJECTION INTRAVENOUS at 14:36

## 2021-09-17 RX ADMIN — ALBUTEROL SULFATE 6 PUFF: 90 AEROSOL, METERED RESPIRATORY (INHALATION) at 15:21

## 2021-09-17 RX ADMIN — PHENYLEPHRINE HYDROCHLORIDE 100 MCG: 10 INJECTION INTRAVENOUS at 14:33

## 2021-09-17 RX ADMIN — PHENYLEPHRINE HYDROCHLORIDE 200 MCG: 10 INJECTION INTRAVENOUS at 14:44

## 2021-09-17 RX ADMIN — PHENYLEPHRINE HYDROCHLORIDE 200 MCG: 10 INJECTION INTRAVENOUS at 14:39

## 2021-09-17 RX ADMIN — ACETAMINOPHEN 975 MG: 325 TABLET ORAL at 11:50

## 2021-09-17 RX ADMIN — FUROSEMIDE 20 MG: 20 TABLET ORAL at 17:47

## 2021-09-17 RX ADMIN — LOSARTAN POTASSIUM 50 MG: 50 TABLET, FILM COATED ORAL at 20:26

## 2021-09-17 RX ADMIN — MAGNESIUM SULFATE HEPTAHYDRATE 4 G: 80 INJECTION, SOLUTION INTRAVENOUS at 11:55

## 2021-09-17 RX ADMIN — Medication 3 G: at 14:12

## 2021-09-17 RX ADMIN — INSULIN GLARGINE 22 UNITS: 100 INJECTION, SOLUTION SUBCUTANEOUS at 20:32

## 2021-09-17 RX ADMIN — PHENYLEPHRINE HYDROCHLORIDE 0.5 MCG/KG/MIN: 10 INJECTION INTRAVENOUS at 14:46

## 2021-09-17 RX ADMIN — ACETAMINOPHEN 1000 MG: 500 TABLET, FILM COATED ORAL at 20:26

## 2021-09-17 RX ADMIN — ALBUTEROL SULFATE 6 PUFF: 90 AEROSOL, METERED RESPIRATORY (INHALATION) at 15:28

## 2021-09-17 RX ADMIN — BUPIVACAINE HYDROCHLORIDE 30 ML: 5 INJECTION, SOLUTION EPIDURAL; INTRACAUDAL; PERINEURAL at 13:20

## 2021-09-17 RX ADMIN — GLYCOPYRROLATE 0.2 MG: 0.2 INJECTION, SOLUTION INTRAMUSCULAR; INTRAVENOUS at 14:36

## 2021-09-17 RX ADMIN — FENTANYL CITRATE 100 MCG: 50 INJECTION, SOLUTION INTRAMUSCULAR; INTRAVENOUS at 14:20

## 2021-09-17 RX ADMIN — ATORVASTATIN CALCIUM 40 MG: 40 TABLET, FILM COATED ORAL at 20:26

## 2021-09-17 RX ADMIN — Medication 10 MG: at 14:43

## 2021-09-17 RX ADMIN — BENZOCAINE AND MENTHOL 1 LOZENGE: 15; 3.6 LOZENGE ORAL at 19:02

## 2021-09-17 RX ADMIN — DOCUSATE SODIUM 50MG AND SENNOSIDES 8.6MG 1 TABLET: 8.6; 5 TABLET, FILM COATED ORAL at 20:26

## 2021-09-17 RX ADMIN — SODIUM CHLORIDE, POTASSIUM CHLORIDE, SODIUM LACTATE AND CALCIUM CHLORIDE: 600; 310; 30; 20 INJECTION, SOLUTION INTRAVENOUS at 17:16

## 2021-09-17 RX ADMIN — SODIUM CHLORIDE, POTASSIUM CHLORIDE, SODIUM LACTATE AND CALCIUM CHLORIDE: 600; 310; 30; 20 INJECTION, SOLUTION INTRAVENOUS at 14:14

## 2021-09-17 RX ADMIN — SODIUM CHLORIDE, POTASSIUM CHLORIDE, SODIUM LACTATE AND CALCIUM CHLORIDE: 600; 310; 30; 20 INJECTION, SOLUTION INTRAVENOUS at 11:55

## 2021-09-17 RX ADMIN — ASPIRIN 81 MG: 81 TABLET, DELAYED RELEASE ORAL at 20:26

## 2021-09-17 RX ADMIN — MIDAZOLAM HYDROCHLORIDE 1 MG: 1 INJECTION, SOLUTION INTRAMUSCULAR; INTRAVENOUS at 13:17

## 2021-09-17 RX ADMIN — PHENYLEPHRINE HYDROCHLORIDE 200 MCG: 10 INJECTION INTRAVENOUS at 14:41

## 2021-09-17 ASSESSMENT — MIFFLIN-ST. JEOR: SCORE: 2031.78

## 2021-09-17 ASSESSMENT — ENCOUNTER SYMPTOMS: SEIZURES: 0

## 2021-09-17 NOTE — ANESTHESIA CARE TRANSFER NOTE
Patient: Reilly Borja    Procedure(s):  REMOVAL OF HARDWARE LEFT FOOT DEEP, IRRIGATION AND DEBRIDEMENT AND BONE BIOPSY LEFT FOOT    Diagnosis: Diabetes mellitus (H) [E11.9]  Foot pain [M79.673]  Diagnosis Additional Information: No value filed.    Anesthesia Type:   General     Note:    Oropharynx: oropharynx clear of all foreign objects  Level of Consciousness: drowsy and awake  Oxygen Supplementation: face mask  Level of Supplemental Oxygen (L/min / FiO2): 8  Independent Airway: airway patency satisfactory and stable  Dentition: dentition unchanged  Vital Signs Stable: post-procedure vital signs reviewed and stable  Report to RN Given: handoff report given  Patient transferred to: PACU    Handoff Report: Identifed the Patient, Identified the Reponsible Provider, Reviewed the pertinent medical history, Discussed the surgical course, Reviewed Intra-OP anesthesia mangement and issues during anesthesia, Set expectations for post-procedure period and Allowed opportunity for questions and acknowledgement of understanding      Vitals:  Vitals Value Taken Time   /83 09/17/21 1553   Temp 36.3  C (97.4  F) 09/17/21 1553   Pulse 72 09/17/21 1556   Resp 18 09/17/21 1553   SpO2 100 % 09/17/21 1556   Vitals shown include unvalidated device data.    Electronically Signed By: ANGELO Duarte CRNA  September 17, 2021  3:57 PM

## 2021-09-17 NOTE — ANESTHESIA PROCEDURE NOTES
Airway       Patient location during procedure: OR  Staff -        CRNA: Corona Alcantara APRN CRNA       Performed By: CRNA  Consent for Airway        Urgency: elective  Indications and Patient Condition       Indications for airway management: danni-procedural       Induction type:intravenous       Mask difficulty assessment: 1 - vent by mask    Final Airway Details       Final airway type: supraglottic airway    Supraglottic Airway Details        Type: LMA       LMA size: 5    Post intubation assessment        Placement verified by: capnometry and chest rise        Number of attempts at approach: 1       Number of other approaches attempted: 1       Ease of procedure: easy       Dentition: Intact

## 2021-09-17 NOTE — PHARMACY-VANCOMYCIN DOSING SERVICE
"Pharmacy Vancomycin Initial Note  Date of Service 2021  Patient's  1950  71 year old, male    Indication: Bone and Joint Infection    Current estimated CrCl = Estimated Creatinine Clearance: 107.6 mL/min (based on SCr of 0.88 mg/dL).    Creatinine for last 3 days  2021: 10:16 AM Creatinine 0.88 mg/dL    Recent Vancomycin Level(s) for last 3 days  No results found for requested labs within last 72 hours.      Vancomycin IV Administrations (past 72 hours)      No vancomycin orders with administrations in past 72 hours.                Nephrotoxins and other renal medications (From now, onward)    Start     Dose/Rate Route Frequency Ordered Stop    21 0600  vancomycin 1250 mg in 0.9% NaCl 250 mL intermittent infusion 1,250 mg      1,250 mg  over 90 Minutes Intravenous EVERY 12 HOURS 21 1720      21 2100  piperacillin-tazobactam (ZOSYN) 3.375 g vial to attach to  mL bag     Note to Pharmacy: For [facility], [facility] and [facility]: For Zosyn-naive patients, use the \"Zosyn initial dose + extended infusion\" order panel.    3.375 g  over 240 Minutes Intravenous EVERY 8 HOURS 21 1703      21 1800  furosemide (LASIX) tablet 20 mg      20 mg Oral 2 TIMES DAILY. 21 1650      21 1800  vancomycin 1750 mg in 0.9% NaCl 500 ml intermittent infusion 1,750 mg      1,750 mg  over 2 Hours Intravenous ONCE 21 1720            Contrast Orders - past 72 hours (72h ago, onward)    None        Loading dose: 1750 mg at 18:00 202.  Regimen: 1250 mg IV every 12 hours.  Start time: 17:18 on 2021  Exposure target: AUC24 (range)400-600 mg/L.hr   AUC24,ss: 504 mg/L.hr  Probability of AUC24 > 400: 74 %  Ctrough,ss: 16.6 mg/L  Probability of Ctrough,ss > 20: 33 %  Probability of nephrotoxicity (Lodise SHANNAN ): 12 %        Plan:  1. Start vancomycin  1750 mg x1 dose, then 1250mg IV q12h.   2. Vancomycin monitoring method: AUC  3. Vancomycin therapeutic monitoring goal: 400-600 " mg*h/L  4. Pharmacy will check vancomycin levels as appropriate in 1-3 Days.    5. Serum creatinine levels will be ordered daily for the first week of therapy and at least twice weekly for subsequent weeks.      Basilia Bhardwaj RPH

## 2021-09-17 NOTE — ANESTHESIA PROCEDURE NOTES
Sciatic Procedure Note  Pre-Procedure   Staff -        Anesthesiologist:  Ambrosio Downing MD       Performed By: anesthesiologist       Location: pre-op       Procedure Start/Stop Times: 9/17/2021 3:15 PM and 9/17/2021 3:20 PM       Pre-Anesthestic Checklist: patient identified, IV checked, site marked, risks and benefits discussed, informed consent, monitors and equipment checked, pre-op evaluation, at physician/surgeon's request and post-op pain management  Timeout:       Correct Patient: Yes        Correct Procedure: Yes        Correct Site: Yes        Correct Position: Yes        Correct Laterality: Yes        Site Marked: Yes  Procedure Documentation  Procedure: Sciatic       Diagnosis: POSTOP ANALGESIA PER SURGEON REQUEST       Laterality: left       Patient Position: supine       Patient Prep/Sterile Barriers: sterile gloves, mask       Skin prep: Chloraprep       Needle Gauge: 20.        Needle Length (Inches): 4        1. Ultrasound was used to identify targeted nerve, plexus, vascular marker, or fascial plane and place a needle adjacent to it in real-time.       2. Ultrasound was used to visualize the spread of anesthetic in close proximity to the above referenced structure.       3. A permanent image is entered into the patient's record.       4. The visualized anatomic structures appeared normal.       5. There were no apparent abnormal pathologic findings.    Assessment/Narrative         The placement was negative for: blood aspirated, painful injection and site bleeding       Paresthesias: No.     Bolus given via needle..        Secured via.        Insertion/Infusion Method: Single Shot       Complications: none    Medication(s) Administered   Medication Administration Time: 9/17/2021 3:20 PM

## 2021-09-17 NOTE — ANESTHESIA POSTPROCEDURE EVALUATION
Patient: Reilly Borja    Procedure(s):  REMOVAL OF HARDWARE LEFT FOOT DEEP, IRRIGATION AND DEBRIDEMENT AND BONE BIOPSY LEFT FOOT    Diagnosis:Diabetes mellitus (H) [E11.9]  Foot pain [M79.673]  Diagnosis Additional Information: No value filed.    Anesthesia Type:  General    Note:  Disposition: Outpatient   Postop Pain Control: Uneventful            Sign Out: Well controlled pain   PONV: No   Neuro/Psych: Uneventful            Sign Out: Acceptable/Baseline neuro status   Airway/Respiratory: Uneventful            Sign Out: Acceptable/Baseline resp. status   CV/Hemodynamics: Uneventful            Sign Out: Acceptable CV status; No obvious hypovolemia; No obvious fluid overload   Other NRE: NONE   DID A NON-ROUTINE EVENT OCCUR? No           Last vitals:  Vitals Value Taken Time   /78 09/17/21 1630   Temp 36.3  C (97.4  F) 09/17/21 1553   Pulse 72 09/17/21 1633   Resp 17 09/17/21 1632   SpO2 96 % 09/17/21 1633   Vitals shown include unvalidated device data.    Electronically Signed By: Leilani Urrutia MD  September 17, 2021  5:02 PM

## 2021-09-17 NOTE — PHARMACY-ADMISSION MEDICATION HISTORY
Pharmacist completed medication history with the patient while in the [facility].  Prior to admission (PTA) med list completed and updated in the electronic medical record (EMR).  Pharmacy Note - Admission Medication History  Pertinent Provider Information: none   ______________________________________________________________________  Prior To Admission (PTA) med list completed and updated in EMR.     Medications Prior to Admission   Medication Sig Dispense Refill Last Dose     atorvastatin (LIPITOR) 80 MG tablet Take 40 mg by mouth daily    9/16/2021 at Unknown time     furosemide (LASIX) 20 MG tablet Take 1 tablet (20 mg) by mouth 2 times daily 14 tablet 0 9/16/2021 at Unknown time     glucose-vitamin C 4-6 GM-MG CHEW Take 1 tablet by mouth every hour as needed    9/16/2021 at Unknown time     insulin glargine (LANTUS VIAL) 100 UNIT/ML vial Inject 30 Units Subcutaneous 2 times daily    9/16/2021 at (17 units )     losartan (COZAAR) 50 MG tablet Take 50 mg by mouth At Bedtime   9/16/2021 at Unknown time     metFORMIN (GLUCOPHAGE) 1000 MG tablet Take 1,000 mg by mouth 2 times daily (with meals)   9/16/2021 at Unknown time     multivitamin (THERMEMS) TABS Take 1 tablet by mouth daily   9/16/2021 at Unknown time     Semaglutide,0.25 or 0.5MG/DOS, 2 MG/1.5ML SOPN Inject 0.5 mg Subcutaneous every 7 days Sunday 9/5/2021         Information source(s): Patient and CareEverywhere/SureScripts  Patient was asked about OTC/herbal products specifically.  PTA med list reflects this.  Based on the pharmacist s assessment, the PTA med list information appears reliable  Allergies were reviewed, assessed, and updated with the patient.    Medications available for use during hospital stay: none  Thank you for the opportunity to participate in the care of this patient.    The patient was asked about OTC/herbal products specifically and the PTA med list reflects the patient's response.    Allergies were reviewed and assessed with the  patient, responses were updated in the EMR.    Thank you for the opportunity to participate in the care of this patient.    Paulo Allen [facility] 9/17/2021 12:30 PM

## 2021-09-17 NOTE — ANESTHESIA PREPROCEDURE EVALUATION
Anesthesia Pre-Procedure Evaluation    Patient: Reilly Borja   MRN: [id number] : 1950        Preoperative Diagnosis: Diabetes mellitus (H) [E11.9]  Foot pain [M79.673]   Procedure : Procedure(s):  LEFT CHARCOT FOOT RECONSTRUCTION     Past Medical History:   Diagnosis Date     Congestive heart failure (H)      Diabetes (H)      Hypertension      Iron deficiency anemia secondary to inadequate dietary iron intake       Past Surgical History:   Procedure Laterality Date     ARTHROSCOPY KNEE Left      C LUMBAR SPINE FUSN,POST INTRBDY Right 2017    Procedure: RIGHT L5-S1 TRANSFORAMINAL LUMBAR INTERBODY FUSION;  Surgeon: Rajiv Zavala MD;  Location: [facility];  Service: Spine     OTHER SURGICAL HISTORY Left     surgery for charcot of left foot      Allergies   Allergen Reactions     Simvastatin Muscle Pain (Myalgia)      Social History     Tobacco Use     Smoking status: Never Smoker     Smokeless tobacco: Never Used   Substance Use Topics     Alcohol use: Not Currently      Wt Readings from Last 1 Encounters:   21 120.2 kg (265 lb)        Anesthesia Evaluation            ROS/MED HX  ENT/Pulmonary:    (-) asthma and sleep apnea   Neurologic:    (-) no seizures and no CVA   Cardiovascular:     (+) hypertension-----CHF (mild per patient, no echo available)  (-) taking anticoagulants/antiplatelets, pacemaker, stent and pacemaker   METS/Exercise Tolerance: >4 METS    Hematologic:  - neg hematologic  ROS     Musculoskeletal: Comment: Charcot joint      GI/Hepatic:     (+) liver disease (NAFLD),  (-) GERD   Renal/Genitourinary:       Endo:     (+) type I DM,     Psychiatric/Substance Use:  - neg psychiatric ROS     Infectious Disease:  - neg infectious disease ROS     Malignancy:       Other:            Physical Exam    Airway        Mallampati: II   TM distance: > 3 FB   Neck ROM: full   Mouth opening: > 3 cm    Respiratory Devices and Support         Dental  no notable dental history          Cardiovascular          Rhythm and rate: regular and normal     Pulmonary           breath sounds clear to auscultation           OUTSIDE LABS:  CBC:   Lab Results   Component Value Date    WBC 5.9 09/16/2021    WBC 7.2 05/28/2021    HGB 10.5 (L) 09/16/2021    HGB 11.5 (L) 05/28/2021    HCT 33.5 (L) 09/16/2021    HCT 35.7 (L) 05/28/2021     09/16/2021     05/28/2021     BMP:   Lab Results   Component Value Date     09/16/2021     06/03/2021    POTASSIUM 4.2 09/16/2021    POTASSIUM 4.3 06/03/2021    CHLORIDE 99 09/16/2021    CHLORIDE 96 06/03/2021    CO2 35 (H) 09/16/2021    CO2 33 (H) 06/03/2021    BUN 19 09/16/2021    BUN 14 06/03/2021    CR 0.88 09/16/2021    CR 0.90 06/03/2021     (H) 09/17/2021     (H) 09/16/2021     COAGS: No results found for: PTT, INR, FIBR  POC: No results found for: BGM, HCG, HCGS  HEPATIC:   Lab Results   Component Value Date    ALBUMIN 2.8 (L) 05/28/2021    PROTTOTAL 7.8 05/28/2021    ALT 33 05/28/2021    AST 21 05/28/2021    ALKPHOS 562 (H) 05/28/2021    BILITOTAL 0.6 05/28/2021     OTHER:   Lab Results   Component Value Date    A1C 7.3 (H) 09/16/2021    ISSA 8.4 (L) 09/16/2021    TSH 1.70 11/16/2019       Anesthesia Plan    ASA Status:  3   NPO Status:  NPO Appropriate    Anesthesia Type: General.     - Airway: ETT   Induction: Propofol.   Maintenance: Balanced.   Techniques and Equipment:       - Drips/Meds: Ketamine     Consents    Anesthesia Plan(s) and associated risks, benefits, and realistic alternatives discussed. Questions answered and patient/representative(s) expressed understanding.     - Discussed with:  Patient      - Extended Intubation/Ventilatory Support Discussed: No.      - Patient is DNR/DNI Status: No    Use of blood products discussed: No .     Postoperative Care    Pain management: Peripheral nerve block (Single Shot).   PONV prophylaxis: Ondansetron (or other 5HT-3), Dexamethasone or Solumedrol, Background Propofol  Infusion     Comments:    Sciatic + adductor canal PNB, GETA, background propofol, ketamine            Shane Ac MD

## 2021-09-17 NOTE — CONSULTS
[facility] MEDICINE CONSULT NOTE   Physician requesting consult: Julius Campbell*    Reason for consult: Postoperative medical management of medical co-morbidities as below    Assessment and Plan    Reilly Borja is a 71 year old old male with a history of IDDM2, HTN, HLD, NAFLD, HFpEF, OA underwent left foot hardware removal and irrigation and debridement and biopsy with general anesthesia. [facility] service was asked to evaluate patient for postoperative medical management as follows below. Please resume the home medications as reconciled and further noted below with ordered hold parameters.  Vital signs have been stable post-operatively including hemodynamically stable blood pressure and heart rate. Thank you for this consult; we will continue to follow this patient until discharge.    HTN  HFpEF  -Order home medication losartan with the written tiered hold parameters given risk for post-operative hypotension. Given ACEi/ARB/diuretic medication, ordered creatine, potassium, and magnesium to evaluate renal function and electrolytes, respectively.   -Home lasix ordered, BID.      DM2  -Hold home oral anti-glycemics given risk for acute hypoglycemia with sulfonylureas and nephrotoxicity if contrast is utilized in any emergent imaging with biguanides; in lieu, order and utilize medium scale insulin corrections three times a day.   -Order home long-acting insulin/lantus and any meal-associating short-acting insulin with inpatient dose-reduction to 75-85% given risk for hypoglycemia and adjust as needed.  -Home Lantus 30 U BID dose-reduced for now to 22 U BID; further adjust back towards home dosing as tolerated and as diet is restarted per post-op bedside protocol.   -Carb-controlled/diabetic diet when not NPO.  -Re-assess daily based on BG trend needs.  -BG post-prandial goal < 180. BG AM fasting goal < 130.     HLD  NAFLD  -Order home statin.    S/p left foot hardware removal and  irrigation and debridement   Infected Hardware  Acute Post-Op Pain  -Agree with current pain control regimen; consider acute pain team consultation if increasingly difficult to control or proves refractory.   -PT evaluation.   -OT evaluation.  -IS frequently, initially every hour while awake as tolerated. Directly encouraged and discussed.   -Appreciate ID consultation for antibiotics.   -Follow-up intra-operative culture results and tailor antibiotics based on biopsy and culture s/s.     Post-Op DVT Prophylaxis  -As per primary surgery team.    Procedure(s):  REMOVAL OF HARDWARE LEFT FOOT DEEP, IRRIGATION AND DEBRIDEMENT AND BONE BIOPSY LEFT FOOT  Post-operative Day: Day of Surgery  Code status:No Order     Type of Anesthesia   General    Estimated Blood Loss:  * No values recorded between 9/17/2021  2:40 PM and 9/17/2021  3:50 PM *    Hospital Problem List   No problem-specific Assessment & Plan notes found for this encounter.    Active Problems:    Charcot's joint of left foot      -Reviewed the patient's preoperative H and P and updated missing elements.  -Home medication reconciliation has been reviewed.  Medications have been ordered as noted from the home list and changes are documented above     HISTORY     Reilly Borja is a 71 year old old male with a history of IDDM2, HTN, HLD, NAFLD, HFpEF, OA underwent left foot hardware removal and irrigation and debridement and biopsy with general anesthesia.    He has been in his usual state of health prior to presenting for this elective surgery.  He denies any associated symptoms prior to coming in today, and also denies any recent travel domestically or internationally, and also denies any recent sick contacts around him including any family or friends who have been sick.  When asked, he denies any fevers, rigors, chest pain or shortness of breath, nausea or vomiting or abdominal pain, changes in bowel movements/pattern, urinary symptoms, focal weakness, or  any other new and associated symptoms at this time.  He has been compliant with his medications.  14 point review of systems performed with the patient without any other pertinent positives at this time.    Presently, he states his pain is currently well-tolerated.  He denies any new complaints or symptoms at this time.    His social history, family history, and past medical history were directly reviewed with him and corroborated to be accurate as documented below. All questions he had at this time were answered to his verbalized and stated satisfaction and understanding.     Past Medical History     Patient Active Problem List    Diagnosis Date Noted     Charcot's joint of left foot 09/17/2021     Priority: Medium     Iron deficiency anemia, unspecified 09/16/2021     Priority: Medium     Vitreous degeneration of right eye 09/16/2021     Priority: Medium     Hx of gout 09/16/2021     Priority: Medium     Pain in left ankle 09/16/2021     Priority: Medium     Personal history of colonic polyps 09/16/2021     Priority: Medium     Astigmatism 09/16/2021     Priority: Medium     Blepharochalasis 09/16/2021     Priority: Medium     Cataract 09/16/2021     Priority: Medium     Nonalcoholic fatty liver disease 09/16/2021     Priority: Medium     Overweight 09/16/2021     Priority: Medium     Presbyopia 09/16/2021     Priority: Medium     Pure hypercholesterolemia 09/16/2021     Priority: Medium     Chronic heart failure, unspecified heart failure type (H) 06/03/2021     Priority: Medium     Essential hypertension 11/16/2019     Priority: Medium     Type 2 diabetes mellitus with complication, with long-term current use of insulin (H) 11/16/2019     Priority: Medium     S/P spinal surgery 11/16/2019     Priority: Medium     Lumbar radiculopathy 02/06/2017     Priority: Medium        Surgical History   He  has a past surgical history that includes Arthroscopy knee (Left); LUMBAR SPINE FUSN,POST INTRBDY (Right, 02/06/2017);  and other surgical history (Left).     Past Surgical History:   Procedure Laterality Date     ARTHROSCOPY KNEE Left      C LUMBAR SPINE FUSN,POST INTRBDY Right 02/06/2017    Procedure: RIGHT L5-S1 TRANSFORAMINAL LUMBAR INTERBODY FUSION;  Surgeon: Rajiv Zavala MD;  Location: [facility];  Service: Spine     OTHER SURGICAL HISTORY Left     surgery for charcot of left foot       Family History    Reviewed, and family history includes Diabetes in his mother.  NO other family history endorsed.   Social History    Reviewed, and he  reports that he has never smoked. He has never used smokeless tobacco. He reports previous alcohol use. He reports that he does not use drugs.  Social History     Tobacco Use     Smoking status: Never Smoker     Smokeless tobacco: Never Used   Substance Use Topics     Alcohol use: Not Currently     Allergies     Allergies   Allergen Reactions     Simvastatin Muscle Pain (Myalgia)       Prior to Admission Medications      Medications Prior to Admission   Medication Sig Dispense Refill Last Dose     atorvastatin (LIPITOR) 80 MG tablet Take 40 mg by mouth daily    9/16/2021 at Unknown time     furosemide (LASIX) 20 MG tablet Take 1 tablet (20 mg) by mouth 2 times daily 14 tablet 0 9/16/2021 at Unknown time     glucose-vitamin C 4-6 GM-MG CHEW Take 1 tablet by mouth every hour as needed    9/16/2021 at Unknown time     insulin glargine (LANTUS VIAL) 100 UNIT/ML vial Inject 30 Units Subcutaneous 2 times daily    9/16/2021 at (17 units )     losartan (COZAAR) 50 MG tablet Take 50 mg by mouth At Bedtime   9/16/2021 at Unknown time     metFORMIN (GLUCOPHAGE) 1000 MG tablet Take 1,000 mg by mouth 2 times daily (with meals)   9/16/2021 at Unknown time     multivitamin (THERMEMS) TABS Take 1 tablet by mouth daily   9/16/2021 at Unknown time     Semaglutide,0.25 or 0.5MG/DOS, 2 MG/1.5ML SOPN Inject 0.5 mg Subcutaneous every 7 days Sunday 9/5/2021       Review of Systems   A 12 point  comprehensive review of systems was negative except as noted above.    OBJECTIVE         Physical Exam   Temp:  [97.4  F (36.3  C)-98.2  F (36.8  C)] 97.4  F (36.3  C)  Pulse:  [71-84] 71  Resp:  [18-28] 18  BP: (137-165)/(80-96) 137/80  SpO2:  [97 %-100 %] 97 %  Body mass index is 34.02 kg/m .  GENERAL:  Alert, appears comfortable, in no acute distress, appears stated age   HEAD:  Normocephalic, without obvious abnormality, atraumatic   EYES:  PERRL, conjunctiva/corneas clear, no scleral icterus, EOM's intact   NOSE: Nares normal, septum midline, mucosa normal, no drainage   THROAT: Lips, mucosa, and tongue normal; teeth and gums normal, mouth moist   NECK: Supple, symmetrical, trachea midline   BACK:   Symmetric, no curvature, ROM normal   LUNGS:   Clear to auscultation bilaterally, no rales, rhonchi, or wheezing, symmetric chest rise on inhalation, respirations unlabored   CHEST WALL:  No tenderness or deformity   HEART:  Regular rate and rhythm, S1 and S2 normal, no murmur, rub, or gallop    ABDOMEN:   Soft, non-tender, bowel sounds active all four quadrants, no masses, no organomegaly, no rebound or guarding   EXTREMITIES: Extremities normal, atraumatic, no cyanosis or edema    SKIN: Dry to touch, no exanthems in the visualized areas   NEURO: Alert, oriented x 4, moves all four extremities freely/spontaneously with limited left dorsi- and plantar-flexion 2/2 anesthesia and pain, limited ROM    PSYCH: Cooperative, behavior is appropriate      Cardiographics Reviewed Personally By Myself   EKG Results: personally reviewed.   Echocardiogram: NA  Imaging Reviewed Personally By Myself    Radiology Results:   Recent Results (from the past 24 hour(s))   XR Surgery PROSPER  Fluoro G/T 5 Min    Narrative    This exam was marked as non-reportable because it will not be read by a   radiologist or a [place] non-radiologist provider.             Labs Reviewed Personally By Myself     Results for orders placed or performed  during the hospital encounter of 09/17/21 (from the past 24 hour(s))   Glucose by meter   Result Value Ref Range    GLUCOSE BY METER POCT 119 (H) 70 - 99 mg/dL   XR Surgery PROSPER  Fluoro G/T 5 Min    Narrative    This exam was marked as non-reportable because it will not be read by a   radiologist or a [place] non-radiologist provider.         Glucose by meter   Result Value Ref Range    GLUCOSE BY METER POCT 87 70 - 99 mg/dL       Preoperative Labs Reviewed Personally By Myself     Thank you for this consultation.  Appreciate the opportunity to participate in the care of Reilly Borja, please feel free to contact us for any questions or concerns.      Sudarshan Pike MD  Internal Medicine  [facility]  Phone: #[phone]

## 2021-09-17 NOTE — ANESTHESIA PROCEDURE NOTES
Airway       Patient location during procedure: OR  Staff -        CRNA: Corona Alcantara APRN CRNA       Performed By: CRNA  Consent for Airway        Urgency: elective  Indications and Patient Condition       Indications for airway management: danni-procedural and airway protection       Induction type:intravenous       Mask difficulty assessment: 1 - vent by mask    Final Airway Details       Final airway type: endotracheal airway       Successful airway: ETT - single  Endotracheal Airway Details        ETT size (mm): 8.0       Successful intubation technique: direct laryngoscopy       DL Blade Type: Dean 2       Grade View of Cords: 1       Adjucts: stylet and tooth guard       Position: Right       Measured from: lips       Secured at (cm): 22       Bite block used: Soft    Post intubation assessment        Placement verified by: capnometry, equal breath sounds and chest rise        Number of attempts at approach: 1       Number of other approaches attempted: 0       Secured with: silk tape       Ease of procedure: easy       Dentition: Intact    Additional Comments       Unable to maintain SpO2 or adequate tidal volume with LMA, Called Dr. Covarrubias to assist, decided to place ETT.

## 2021-09-17 NOTE — INTERVAL H&P NOTE
I have reviewed the surgical (or preoperative) H&P that is linked to this encounter, and examined the patient. There are no significant changes    Here with history of charcot arthropathy with non union following prior surgery.  No assurance of success was provided and he understood.

## 2021-09-17 NOTE — BRIEF OP NOTE
[facility]    Brief Operative Note    Pre-operative diagnosis: Diabetes mellitus (H) [E11.9]  Foot pain [M79.673]/ charcot hindfoot  Post-operative diagnosis Same as pre-operative diagnosis    Procedure: Procedure(s):  REMOVAL OF HARDWARE LEFT FOOT DEEP, IRRIGATION AND DEBRIDEMENT AND BONE BIOPSY LEFT FOOT  Surgeon: Surgeon(s) and Role:     * Julius Campbell,  - Primary     * Anjelica Ruiz PA-C - Assisting  Anesthesia: Choice   Estimated blood loss: 100 cc  Drains: None  Specimens:   ID Type Source Tests Collected by Time Destination   A :  Swab Foot, Left ANAEROBIC BACTERIAL CULTURE ROUTINE, GRAM STAIN, AEROBIC BACTERIAL CULTURE ROUTINE Julius Campbell, DO 9/17/2021  2:56 PM    B :  Swab Foot, Left ANAEROBIC BACTERIAL CULTURE ROUTINE, GRAM STAIN, AEROBIC BACTERIAL CULTURE ROUTINE Julius Campbell, DO 9/17/2021  3:01 PM    C :  Swab Foot, Left ANAEROBIC BACTERIAL CULTURE ROUTINE, GRAM STAIN, AEROBIC BACTERIAL CULTURE ROUTINE Julius Campbell, DO 9/17/2021  3:13 PM    D :  Bone Biopsy Foot, Left ANAEROBIC BACTERIAL CULTURE ROUTINE, GRAM STAIN, AEROBIC BACTERIAL CULTURE ROUTINE Julius Campbell, DO 9/17/2021  3:17 PM      Findings:   enexpected purulence throughout the hindfoot.  Complications: we unexpecedly encountered gross pus throughout the hindfoot..  Implants:   Implant Name Type Inv. Item Serial No.  Lot No. LRB No. Used Action   GRAFT BONE INFUSE BMP  [id number] Bone/Tissue Synthetic GRAFT BONE INFUSE BMP  [id number] [employer]  Left 1 Wasted   V92 FIBER CELLULAR BONE MATRIX 10   [id number] PARAGON 28  Left 1 Wasted

## 2021-09-17 NOTE — OP NOTE
Preoperative diagnosis: Charcot neuroarthropathy, left foot with nonunion following prior percutaneous attempt at fusion  Postoperative diagnosis: Deep infection, left foot following prior Charcot reconstruction    Procedure:  Removal of hardware, deep, left foot including skin, subcutaneous tissue, fascial tissue, bone  Irrigation debridement left foot including skin, subcutaneous tissue, fascial tissue, bone    Date of surgery: 9/17/2021    Implants: None    Specimens: Soft tissue culture swabs x3  Bone and soft tissue specimen for culture x1.  Medial foot.    Anesthesia: Block plus LMA    Complications: Unexpected infection prompting irrigation and debridement as opposed to reconstructive procedure    Indications: Abdirizak is a 71-year-old gentleman with a history of diabetic neuropathy who developed a Charcot foot.  He underwent a percutaneous medial and lateral column bolt type procedure with small percutaneous incisions indicating a percutaneous attempt at joint prep and fusion.  Unfortunately he did not fuse.  He subsequently underwent removal of the medial column bolt as this wiggled free.  He has had increasing pain since the procedure which was performed more than 6 months ago.  At no point did he have any draining wounds, redness, warmth, or systemic symptoms consistent with infection.  All of incision site healed nicely.    Description of procedure: Patient was identified in preoperative holding using 2 unique patient identifiers.  Consent was reviewed, signed, and placed in the chart.  Consent was signed for a medial column arthrodesis as anticipated, and we discussed the risks of surgery including wound healing problems, infection, malunion, nonunion, the need for additional surgery.  Although rare, and 80s complications could lead to amputation and he understood this.  Anesthetic risks were reviewed by anesthesia staff.  Anesthesia performed a perioperative block.  Consent was signed and placed in the  chart.  He was rolled to the operating room after informed consent and block.    Once in the operating room the patient was placed supine on the operating table.  A bump was placed.  Tourniquet was applied.  Anesthesia was induced.  The patient was then prepped and draped in sterile fashion.  Following this all members of the operative team performed a perioperative timeout identifying that the correct patient, side, site, and procedure were seen.    The limb was then exsanguinated and Esmarch bandage and the tourniquet was inflated.  We had difficulty obtaining hemostasis with the calf tourniquet so we released the tourniquet, placed a thigh tourniquet, we exsanguinated the limb and did have good hemostasis.  I performed a medial incision from the tip of the medial malleolus down to the midpoint of the first metatarsal.  Full thickness flaps were elevated.  The tibialis anterior tendon was identified and protected.  A subperiosteal exposure was then performed to the hindfoot including the talonavicular and naviculocuneiform joints.  As we exposed the joints we did note unexpected purulence.  This did not track to a particular abscess but rather was seeming to be coming from the area of the naviculocuneiform and talonavicular joints.  We cultured this numerous times as well as obtain bone and soft tissue cultures which were sent to the lab as well.  At that point it was clear that we could not perform a medial column arthrodesis in the setting of ongoing infection.  The decision was made to remove the lateral hardware as we suspected that this may be infected as well.  A lateral approach was then made through the prior incision and we dissected down to the fusion bolt which was grossly loose and surrounded by purulent tissue.  This was removed uneventfully.  We also took a swab for the lateral foot which was sent to the lab.  Following this we copiously irrigated both incisions with several liters of sterile saline  via gravity flow.  Visual inspection did not reveal any obvious residual infectious tissue, necrotic tissue or other sign of ongoing infection.  X-ray confirmed removal of hardware.  We then closed the wound in layers with Monocryl and nylon suture.    Plan was for discharge to home, however given the need for an infectious disease consult and IV antibiotics he will be spending the weekend in the hospital.  If he is found to have osteomyelitis this could prove difficult for limb salvage.  My general plan would be to wait for cultures.  If osteomyelitis is present to have a discussion with him at that time regarding the utility of a course of IV antibiotics versus below-knee amputation.  If cultures of bone are proven to be negative I think it would be prudent to do a course of IV antibiotics followed by an antibiotic holiday and perhaps a tagged white blood cell scan all the while following his inflammatory markers.  I had this conversation with [place] and will have this conversation with Mr. Miguelrainer as well.    At the conclusion the case the patient was successfully extubated.  Sponge needle counts were correct.  He was transported to PACU in stable condition with anesthesia personnel present.    Plan:  Nonweightbearing  Ice and elevation  IV antibiotics.  I started him on Vanco and Zosyn.  Medicine and ID consulted  Further pending cultures

## 2021-09-18 ENCOUNTER — APPOINTMENT (OUTPATIENT)
Dept: PHYSICAL THERAPY | Facility: CLINIC | Age: 71
DRG: 496 | End: 2021-09-18
Attending: ORTHOPAEDIC SURGERY
Payer: MEDICARE

## 2021-09-18 ENCOUNTER — APPOINTMENT (OUTPATIENT)
Dept: RADIOLOGY | Facility: CLINIC | Age: 71
DRG: 496 | End: 2021-09-18
Attending: HOSPITALIST
Payer: MEDICARE

## 2021-09-18 LAB
ANION GAP SERPL CALCULATED.3IONS-SCNC: 8 MMOL/L (ref 5–18)
BNP SERPL-MCNC: 458 PG/ML (ref 0–67)
BUN SERPL-MCNC: 18 MG/DL (ref 8–28)
C REACTIVE PROTEIN LHE: 5.8 MG/DL (ref 0–0.8)
CALCIUM SERPL-MCNC: 8.2 MG/DL (ref 8.5–10.5)
CHLORIDE BLD-SCNC: 96 MMOL/L (ref 98–107)
CO2 SERPL-SCNC: 31 MMOL/L (ref 22–31)
CREAT SERPL-MCNC: 0.77 MG/DL (ref 0.7–1.3)
ERYTHROCYTE [SEDIMENTATION RATE] IN BLOOD BY WESTERGREN METHOD: 79 MM/HR (ref 0–15)
GFR SERPL CREATININE-BSD FRML MDRD: >90 ML/MIN/1.73M2
GLUCOSE BLD-MCNC: 241 MG/DL (ref 70–125)
GLUCOSE BLDC GLUCOMTR-MCNC: 188 MG/DL (ref 70–99)
GLUCOSE BLDC GLUCOMTR-MCNC: 210 MG/DL (ref 70–99)
GLUCOSE BLDC GLUCOMTR-MCNC: 213 MG/DL (ref 70–99)
GLUCOSE BLDC GLUCOMTR-MCNC: 291 MG/DL (ref 70–99)
HGB BLD-MCNC: 9.6 G/DL (ref 13.3–17.7)
MAGNESIUM SERPL-MCNC: 1.9 MG/DL (ref 1.8–2.6)
MAGNESIUM SERPL-MCNC: 2 MG/DL (ref 1.8–2.6)
POTASSIUM BLD-SCNC: 4.3 MMOL/L (ref 3.5–5)
POTASSIUM BLD-SCNC: 4.7 MMOL/L (ref 3.5–5)
SODIUM SERPL-SCNC: 135 MMOL/L (ref 136–145)

## 2021-09-18 PROCEDURE — 99222 1ST HOSP IP/OBS MODERATE 55: CPT | Performed by: INTERNAL MEDICINE

## 2021-09-18 PROCEDURE — 250N000011 HC RX IP 250 OP 636: Performed by: HOSPITALIST

## 2021-09-18 PROCEDURE — 71045 X-RAY EXAM CHEST 1 VIEW: CPT

## 2021-09-18 PROCEDURE — 83880 ASSAY OF NATRIURETIC PEPTIDE: CPT | Performed by: HOSPITALIST

## 2021-09-18 PROCEDURE — 86141 C-REACTIVE PROTEIN HS: CPT | Performed by: ORTHOPAEDIC SURGERY

## 2021-09-18 PROCEDURE — 83735 ASSAY OF MAGNESIUM: CPT | Performed by: HOSPITALIST

## 2021-09-18 PROCEDURE — 258N000003 HC RX IP 258 OP 636: Performed by: ORTHOPAEDIC SURGERY

## 2021-09-18 PROCEDURE — 96372 THER/PROPH/DIAG INJ SC/IM: CPT | Performed by: HOSPITALIST

## 2021-09-18 PROCEDURE — 85652 RBC SED RATE AUTOMATED: CPT | Performed by: ORTHOPAEDIC SURGERY

## 2021-09-18 PROCEDURE — 250N000013 HC RX MED GY IP 250 OP 250 PS 637: Performed by: ORTHOPAEDIC SURGERY

## 2021-09-18 PROCEDURE — 99213 OFFICE O/P EST LOW 20 MIN: CPT | Performed by: HOSPITALIST

## 2021-09-18 PROCEDURE — 97116 GAIT TRAINING THERAPY: CPT | Mod: GP

## 2021-09-18 PROCEDURE — 36415 COLL VENOUS BLD VENIPUNCTURE: CPT | Performed by: ORTHOPAEDIC SURGERY

## 2021-09-18 PROCEDURE — 97162 PT EVAL MOD COMPLEX 30 MIN: CPT | Mod: GP

## 2021-09-18 PROCEDURE — 99207 PR CDG-CODE CATEGORY CHANGED: CPT | Performed by: HOSPITALIST

## 2021-09-18 PROCEDURE — 80048 BASIC METABOLIC PNL TOTAL CA: CPT | Performed by: HOSPITALIST

## 2021-09-18 PROCEDURE — 250N000013 HC RX MED GY IP 250 OP 250 PS 637: Performed by: HOSPITALIST

## 2021-09-18 PROCEDURE — 250N000011 HC RX IP 250 OP 636: Performed by: ORTHOPAEDIC SURGERY

## 2021-09-18 PROCEDURE — 250N000012 HC RX MED GY IP 250 OP 636 PS 637: Performed by: HOSPITALIST

## 2021-09-18 PROCEDURE — 85018 HEMOGLOBIN: CPT | Performed by: ORTHOPAEDIC SURGERY

## 2021-09-18 PROCEDURE — 36415 COLL VENOUS BLD VENIPUNCTURE: CPT | Performed by: HOSPITALIST

## 2021-09-18 PROCEDURE — 84132 ASSAY OF SERUM POTASSIUM: CPT | Performed by: HOSPITALIST

## 2021-09-18 RX ORDER — FUROSEMIDE 10 MG/ML
20 INJECTION INTRAMUSCULAR; INTRAVENOUS EVERY 12 HOURS
Status: DISCONTINUED | OUTPATIENT
Start: 2021-09-18 | End: 2021-09-19

## 2021-09-18 RX ADMIN — DOCUSATE SODIUM 50MG AND SENNOSIDES 8.6MG 1 TABLET: 8.6; 5 TABLET, FILM COATED ORAL at 08:12

## 2021-09-18 RX ADMIN — ASPIRIN 81 MG: 81 TABLET, DELAYED RELEASE ORAL at 21:16

## 2021-09-18 RX ADMIN — METFORMIN HYDROCHLORIDE 1000 MG: 500 TABLET, FILM COATED ORAL at 08:12

## 2021-09-18 RX ADMIN — INSULIN ASPART 2 UNITS: 100 INJECTION, SOLUTION INTRAVENOUS; SUBCUTANEOUS at 11:35

## 2021-09-18 RX ADMIN — INSULIN GLARGINE 30 UNITS: 100 INJECTION, SOLUTION SUBCUTANEOUS at 09:54

## 2021-09-18 RX ADMIN — ACETAMINOPHEN 1000 MG: 500 TABLET, FILM COATED ORAL at 04:25

## 2021-09-18 RX ADMIN — FUROSEMIDE 20 MG: 10 INJECTION, SOLUTION INTRAMUSCULAR; INTRAVENOUS at 13:39

## 2021-09-18 RX ADMIN — INSULIN ASPART 4 UNITS: 100 INJECTION, SOLUTION INTRAVENOUS; SUBCUTANEOUS at 17:58

## 2021-09-18 RX ADMIN — LOSARTAN POTASSIUM 50 MG: 50 TABLET, FILM COATED ORAL at 21:16

## 2021-09-18 RX ADMIN — FUROSEMIDE 20 MG: 20 TABLET ORAL at 08:12

## 2021-09-18 RX ADMIN — PIPERACILLIN AND TAZOBACTAM 3.38 G: 3; .375 INJECTION, POWDER, LYOPHILIZED, FOR SOLUTION INTRAVENOUS at 21:16

## 2021-09-18 RX ADMIN — PIPERACILLIN AND TAZOBACTAM 3.38 G: 3; .375 INJECTION, POWDER, LYOPHILIZED, FOR SOLUTION INTRAVENOUS at 13:40

## 2021-09-18 RX ADMIN — INSULIN ASPART 2 UNITS: 100 INJECTION, SOLUTION INTRAVENOUS; SUBCUTANEOUS at 06:41

## 2021-09-18 RX ADMIN — INSULIN GLARGINE 30 UNITS: 100 INJECTION, SOLUTION SUBCUTANEOUS at 21:16

## 2021-09-18 RX ADMIN — DOCUSATE SODIUM 50MG AND SENNOSIDES 8.6MG 1 TABLET: 8.6; 5 TABLET, FILM COATED ORAL at 21:16

## 2021-09-18 RX ADMIN — METFORMIN HYDROCHLORIDE 1000 MG: 500 TABLET, FILM COATED ORAL at 17:58

## 2021-09-18 RX ADMIN — POLYETHYLENE GLYCOL 3350 17 G: 17 POWDER, FOR SOLUTION ORAL at 08:12

## 2021-09-18 RX ADMIN — VANCOMYCIN HYDROCHLORIDE 1250 MG: 5 INJECTION, POWDER, LYOPHILIZED, FOR SOLUTION INTRAVENOUS at 06:40

## 2021-09-18 RX ADMIN — ACETAMINOPHEN 1000 MG: 500 TABLET, FILM COATED ORAL at 21:15

## 2021-09-18 RX ADMIN — ACETAMINOPHEN 1000 MG: 500 TABLET, FILM COATED ORAL at 11:34

## 2021-09-18 RX ADMIN — ASPIRIN 81 MG: 81 TABLET, DELAYED RELEASE ORAL at 08:12

## 2021-09-18 RX ADMIN — VANCOMYCIN HYDROCHLORIDE 1250 MG: 5 INJECTION, POWDER, LYOPHILIZED, FOR SOLUTION INTRAVENOUS at 17:58

## 2021-09-18 RX ADMIN — PIPERACILLIN AND TAZOBACTAM 3.38 G: 3; .375 INJECTION, POWDER, LYOPHILIZED, FOR SOLUTION INTRAVENOUS at 04:25

## 2021-09-18 RX ADMIN — ATORVASTATIN CALCIUM 40 MG: 40 TABLET, FILM COATED ORAL at 21:16

## 2021-09-18 NOTE — PROGRESS NOTES
[facility]      OUTPATIENT PHYSICAL THERAPY EVALUATION  PLAN OF TREATMENT FOR OUTPATIENT REHABILITATION  (COMPLETE FOR INITIAL CLAIMS ONLY)  Patient's Last Name, First Name, M.I.  YOB: 1950  Reilly Borja                        Provider's Name  [facility] Medical Record No.  [id number]                               Onset Date:  (P) 09/17/21   Start of Care Date:  (P) 09/18/21      Type:     _X_PT   ___OT   ___SLP Medical Diagnosis:  (P) S/P L ankle I&D                        PT Diagnosis:  (P) Impaired functional mobility   Visits from SOC:  1   _________________________________________________________________________________  Plan of Treatment/Functional Goals    Planned Interventions: (P) gait training, bed mobility training, balance training, stair training, strengthening, transfer training     Goals: See Physical Therapy Goals on Care Plan in [facility] electronic health record.    Therapy Frequency: (P) Daily  Predicted Duration of Therapy Intervention: (P) 3 days  _________________________________________________________________________________    I CERTIFY THE NEED FOR THESE SERVICES FURNISHED UNDER        THIS PLAN OF TREATMENT AND WHILE UNDER MY CARE     (Physician co-signature of this document indicates review and certification of the therapy plan).                Certification date from: (P) 09/18/21, Certification date to: (P) 10/09/21    Referring Physician: (BABATUNDE) Julius Campbell, DO            Initial Assessment        See Physical Therapy evaluation dated (P) 09/18/21 in Epic electronic health record.

## 2021-09-18 NOTE — PROGRESS NOTES
"S)  Doing well.  No pain, block continues  Denies fevers/ chills/ chest pain/ SOB    O)  BP (!) 151/80 (BP Location: Left arm)   Pulse 81   Temp 97.7  F (36.5  C) (Oral)   Resp 16   Ht 1.88 m (6' 2\")   Wt 120.2 kg (265 lb)   SpO2 93%   BMI 34.02 kg/m      Dressing in place on the LLE  Numbness from block persists  Toes are warm/ well perfused    A)  S/P LLE I&D POD 1    P)  I discussed the purulence found at the time of surgery and our inability to proceed with fusion due to suspected infection.  He understood.  Outlined plan to follow cultures with abx based on Cx result.  I explained that if has osteomyeleitis it may prove difficult/ impossible to cure and could result in amputation however I reiterated my commitment for that to be the last option.  For now we'll follow cultures and await ID input.  NWB on LLE.  Appreicate medical mgmt.  DVT prophy with ASA BID.    Julius Campbell, DO  [place]   "

## 2021-09-18 NOTE — CONSULTS
Consultation - Infectious Disease  [facility]  Reilly WELCH Jonh,  1950, MRN [id number]    Admitting Dx: Diabetes mellitus (H) [E11.9]  Foot pain [M79.673]  Charcot's joint of left foot [M14.672]    PCP: No Ref-Primary, Physician, None       ASSESSMENT   71-year-old male with a history of diabetes, hypertension, who was admitted for revision of Charcot foot repair, left.    1. Charcot foot, left.  Status post hardware placement for repair in March/April of this year.  Required revision surgery a few weeks later for loosening of hardware.  Interestingly had one of his bolts poking through the skin, requiring his physician to remove it in the clinic  2. Persistent foot pain following surgery.  [facility].  Underwent hardware removal on .  Unexpected finding of purulence.  Multiple cultures collected, including bone cultures.  No antibiotics or systemic signs of infection prior to procedure.    Active Problems:    Essential hypertension    Type 2 diabetes mellitus with complication, with long-term current use of insulin (H)    Chronic heart failure, unspecified heart failure type (H)    Nonalcoholic fatty liver disease    Pure hypercholesterolemia    Charcot's joint of left foot    Status post debridement       PLAN   -Concern for hardware infection and associated osteomyelitis based on intraoperative findings  -Agree with empiric vancomycin and Zosyn  -Follow-up on cultures, tailor antibiotics as appropriate  -Pharmacy to dose vancomycin, monitor creatinine closely      Thank you for this consult. Will follow.    Michael Marlow MD  [facility]  Direct messaging: [employer] Paging  On-Call ID provider: [phone], option: [phone]      ===========================================      Chief Complaint   <principal problem not specified>       HPI     We have been requested by Julius Campbell* to evaluate Reilly Borja  for the above.    History obtained by patient    Reilly Borja is a 71 year old man with a history of diabetes, hypertension who underwent elective revision of Charcot foot repair on 9/17.  Intraoperatively, purulent material was found, raising concern for infection.  Patient underwent hardware removal with multiple cultures collected.  He was admitted to the hospital for work-up of infection and IV antibiotics.    The patient is a relatively active person, he still works.  [facility] and underwent Charcot repair surgery in the spring of this year.  There was some loosening after surgery and he needed revision surgery a few weeks later.  After his second surgery he saw that one of the rods was poking out of his foot.  He went back to his clinic where the yasir was removed in the clinic.  Over the next few months he had increasing pain in his left foot.  He transferred his care to [facility] and underwent planned surgery yesterday.          Review of Systems   Ten systems reviewed and negative except for what is noted in the HPI         Medical History  Past Medical History:   Diagnosis Date     Congestive heart failure (H)      Diabetes (H)      Hypertension      Iron deficiency anemia secondary to inadequate dietary iron intake     Surgical History  He  has a past surgical history that includes Arthroscopy knee (Left); LUMBAR SPINE FUSN,POST INTRBDY (Right, 02/06/2017); and other surgical history (Left).     Social History  Reviewed, and he  reports that he has never smoked. He has never used smokeless tobacco. He reports previous alcohol use. He reports that he does not use drugs.  Social History     Social History Narrative     Not on file     Family History  family history includes Diabetes in his mother.  family history reviewed and is not pertinent to the presenting problem.            Allergies     Allergies   Allergen Reactions     Simvastatin Muscle Pain (Myalgia)  "        Antibiotics   Vancomycin 9/17  Zosyn 9/17  Perioperative cefazolin 9/17    Previous:  None      Physical Exam     Temp:  [97.3  F (36.3  C)-98.8  F (37.1  C)] 97.7  F (36.5  C)  Pulse:  [71-84] 81  Resp:  [16-28] 16  BP: (131-184)/(65-96) 151/80  SpO2:  [86 %-100 %] 93 %    BP (!) 151/80 (BP Location: Left arm)   Pulse 81   Temp 97.7  F (36.5  C) (Oral)   Resp 16   Ht 1.88 m (6' 2\")   Wt 120.2 kg (265 lb)   SpO2 93%   BMI 34.02 kg/m      GENERAL:  well-developed, well-nourished, sitting in bed in no acute distress.   HENT:  Head is normocephalic, atraumatic.   EYES:  Eyes have anicteric sclerae without conjunctival injection   LUNGS:  Clear to auscultation.  CARDIOVASCULAR:  Regular rate and rhythm with no murmurs, gallops or rubs.  ABDOMEN:  Normal bowel sounds, soft, nontender. No appreciable hepatosplenomegaly  EXT: Left foot is wrapped  SKIN:  No acute rashes. No stigmata of endocarditis.  NEUROLOGIC:  Grossly nonfocal.      Cultures   9/17 left foot samples A, B, C, D: No organisms on Gram stain; cultures pending      Laboratory results     Recent Labs   Lab 09/18/21  0531 09/16/21  1016   WBC  --  5.9   HGB 9.6* 10.5*   PLT  --  327       Recent Labs   Lab 09/18/21  0531 09/16/21  1016   * 135   CO2 31 35*   BUN 18 19       Recent Labs   Lab 09/18/21  0924   CRP 5.8*           Imaging   Radiology results reviewed    XR Surgery PROSPER  Fluoro G/T 5 Min    Result Date: 9/17/2021  This exam was marked as non-reportable because it will not be read by a radiologist or a [place] non-radiologist provider.       Data reviewed today: I reviewed all medications, new labs and imaging results over the last 24 hours. I personally reviewed no images or EKG's today.  The patient's care was discussed with the Patient.    "

## 2021-09-18 NOTE — PLAN OF CARE
Patient vital signs are at baseline: Yes  Patient able to ambulate as they were prior to admission or with assist devices provided by therapies during their stay:  Yes  Patient MUST void prior to discharge:  Yes  Patient able to tolerate oral intake:  Yes  Pain has adequate pain control using Oral analgesics:  Yes    Patient denies pain. Scheduled tylenol only and ice packs offered. LLE elevated on pillows. Block still intact to LLE, patient reports no sensation, unable to wiggle toes at this time. NWB status, assist of one with walker. LLE ace wrap remains clean, dry and intact. IV antibiotics infusing as ordered. Blood sugars elevated today. Home medication resumed. Patient did have smoothie brought in by significant other.

## 2021-09-18 NOTE — PROGRESS NOTES
09/18/21 1000   Quick Adds   Quick Adds Certification   Type of Visit Initial PT Evaluation   Living Environment   People in home significant other   Current Living Arrangements house   Home Accessibility stairs to enter home;stairs within home   Number of Stairs, Main Entrance 3   Stair Railings, Main Entrance railing on left side (ascending)   Number of Stairs, Within Home, Primary greater than 10 stairs   Stair Railings, Within Home, Primary railings safe and in good condition   Self-Care   Equipment Currently Used at Home crutches   Disability/Function   Fall history within last six months no   General Information   Onset of Illness/Injury or Date of Surgery 09/17/21   Referring Physician Julius Campbell, DO   Patient/Family Therapy Goals Statement (PT) Go home   Pertinent History of Current Problem (include personal factors and/or comorbidities that impact the POC) S/P L Ankle Hardware removal and I&D   Weight-Bearing Status - LLE nonweight-bearing   Bed Mobility   Bed Mobility supine-sit;sit-supine   Supine-Sit [place] (Bed Mobility) supervision;verbal cues   Sit-Supine [place] (Bed Mobility) supervision;verbal cues   Transfers   Transfers sit-stand transfer   Impairments Contributing to Impaired Transfers decreased strength   Sit-Stand Transfer   Sit-Stand [place] (Transfers) contact guard;verbal cues   Assistive Device (Sit-Stand Transfers) crutches, axillary;walker, front-wheeled   Gait/Stairs (Locomotion)   [place] Level (Gait) contact guard;verbal cues   Assistive Device (Gait) walker, front-wheeled;cane, straight   Distance in Feet (Required for LE Total Joints) 10, 25   Pattern (Gait) step-to   Maintains Weight-bearing Status (Gait) verbal cues to maintain   Clinical Impression   Criteria for Skilled Therapeutic Intervention yes, treatment indicated   PT Diagnosis (PT) Impaired functional mobility   Influenced by the following impairments weakness, impaired balance,  decreased endurance   Functional limitations due to impairments Transfers, gait, stairs   Clinical Presentation Stable/Uncomplicated   Clinical Presentation Rationale Pt presents as medically diagnosed   Clinical Decision Making (Complexity) moderate complexity   Therapy Frequency (PT) Daily   Predicted Duration of Therapy Intervention (days/wks) 3 days   Planned Therapy Interventions (PT) gait training;bed mobility training;balance training;stair training;strengthening;transfer training   Anticipated Equipment Needs at Discharge (PT) walker, rolling;crutches, axillary   Risk & Benefits of therapy have been explained patient;evaluation/treatment results reviewed;care plan/treatment goals reviewed   PT Discharge Planning    PT Discharge Recommendation (DC Rec) home with assist   PT Rationale for DC Rec Pending patient's ability to negotiate stairs   Therapy Certification   Start of care date 09/18/21   Certification date from 09/18/21   Certification date to 10/09/21   Medical Diagnosis S/P L ankle I&D   Total Evaluation Time   Total Evaluation Time (Minutes) 10

## 2021-09-18 NOTE — PROGRESS NOTES
[facility] MEDICINE CONSULT PROGRESS NOTE      Identification/Summary:   Reilly Borja is a 71 year old old male with a history of IDDM2, HTN, HLD, NAFLD, HFpEF, OA underwent left foot hardware removal and irrigation and debridement and biopsy with general anesthesia. [facility] service was asked to evaluate patient for postoperative medical management as follows below. Please resume the home medications as reconciled and further noted below with ordered hold parameters.  Vital signs have been stable post-operatively including hemodynamically stable blood pressure and heart rate. Thank you for this consult; we will continue to follow this patient until discharge.    Assessment and Plan  Shortness of Breath  -Home lasix already ordered and IS utilized; however, given danni-operative context and intraop fluids, with his endorsing increased shortness of breath will check CXR and BNP. If grossly abnormal would treat with IV lasix until clinically improved and resolved SOB symptoms.    HTN  HFpEF - ?acute on chronic  -Order home medication losartan with the written tiered hold parameters given risk for post-operative hypotension. Given ACEi/ARB/diuretic medication, ordered creatine, potassium, and magnesium to evaluate renal function and electrolytes, respectively.   -Home lasix ordered, BID.   -As above in shortness of breath, Follow-up CXR and BNP     DM2  -Reorder home regiment, home metformin, home Lantus 30 U at bedtime. Continue mealtime ISS.   -Carb-controlled/diabetic diet when not NPO.  -Re-assess daily based on BG trend needs.  -BG post-prandial goal < 180. BG AM fasting goal < 130.      HLD  NAFLD  -Order home statin.     S/p left foot hardware removal and irrigation and debridement   Infected Hardware  Acute Post-Op Pain  -Agree with current pain control regimen; consider acute pain team consultation if increasingly difficult to control or proves refractory.   -PT evaluation.   -OT  evaluation.  -IS frequently, initially every hour while awake as tolerated. Directly encouraged and discussed.   -Appreciate ID consultation for antibiotics.   -Follow-up intra-operative culture results and tailor antibiotics based on biopsy and culture s/s.     Post-Op DVT Prophylaxis  -As per primary surgery team.      Interval History/Subjective:  No acute events overnight.    No chest pain. He does endorse shortness of breath beyond normal, including at rest. No other new complaints. Discussed CXR and lab tests, and possibly IV diuresis pending that work-up. Discussed plan of care with patient. Answered all questons to patient's verbalized and stated understanding and satisfaction.    Physical Exam/Objective:  Temp:  [97.3  F (36.3  C)-98.8  F (37.1  C)] 97.7  F (36.5  C)  Pulse:  [71-84] 81  Resp:  [16-28] 16  BP: (131-184)/(65-96) 151/80  SpO2:  [86 %-100 %] 97 %  Body mass index is 34.02 kg/m .    GENERAL:  Alert, appears comfortable, in no acute distress, appears stated age   HEAD:  Normocephalic, without obvious abnormality, atraumatic   EYES:  PERRL, conjunctiva/corneas clear, no scleral icterus, EOM's intact   NOSE: Nares normal, septum midline, mucosa normal, no drainage   THROAT: Lips, mucosa, and tongue normal; teeth and gums normal, mouth moist   NECK: Supple, symmetrical, trachea midline   BACK:   Symmetric, no curvature, ROM normal   LUNGS:   Clear to auscultation bilaterally, no rales, rhonchi, or wheezing, symmetric chest rise on inhalation, respirations unlabored   CHEST WALL:  No tenderness or deformity   HEART:  Regular rate and rhythm, S1 and S2 normal, no murmur, rub, or gallop    ABDOMEN:   Soft, non-tender, bowel sounds active all four quadrants, no masses, no organomegaly, no rebound or guarding   EXTREMITIES: Extremities normal, atraumatic, no cyanosis or edema    SKIN: Dry to touch, no exanthems in the visualized areas   NEURO: Alert, oriented x3, moves all four extremities freely    PSYCH: Cooperative, behavior is appropriate      Medications:   Personally Reviewed.  Medications     lactated ringers Stopped (09/17/21 2025)       acetaminophen  1,000 mg Oral Q8H     aspirin  81 mg Oral BID     atorvastatin  40 mg Oral At Bedtime     furosemide  20 mg Oral BID     insulin aspart  1-7 Units Subcutaneous TID AC     insulin glargine  30 Units Subcutaneous BID     losartan  50 mg Oral At Bedtime     metFORMIN  1,000 mg Oral BID w/meals     piperacillin-tazobactam  3.375 g Intravenous Q8H     polyethylene glycol  17 g Oral Daily     senna-docusate  1 tablet Oral BID     sodium chloride (PF)  3 mL Intracatheter Q8H     vancomycin  1,250 mg Intravenous Q12H       Data reviewed today: I personally reviewed all new medications, labs, imaging/diagnostics reports over the past 24 hours. Pertinent findings include:    Imaging:   Recent Results (from the past 24 hour(s))   XR Surgery PROSPER  Fluoro G/T 5 Min    Narrative    This exam was marked as non-reportable because it will not be read by a   radiologist or a [place] non-radiologist provider.             Labs:  Most Recent 3 CBC's:Recent Labs   Lab Test 09/18/21  0531 09/16/21  1016 05/28/21  2317 11/16/19  1004 11/16/19  1004   WBC  --  5.9 7.2  --  5.8   HGB 9.6* 10.5* 11.5*   < > 14.2   MCV  --  84 90  --  92   PLT  --  327 360  --  203    < > = values in this interval not displayed.     Most Recent 3 BMP's:Recent Labs   Lab Test 09/18/21  0631 09/18/21  0531 09/17/21 2023 09/17/21  1143 09/16/21  1016 06/03/21  1347   NA  --  135*  --   --  135 135   POTASSIUM  --  4.3  --   --  4.2 4.3   CHLORIDE  --  96*  --   --  99 96   CO2  --  31  --   --  35* 33*   BUN  --  18  --   --  19 14   CR  --  0.77  --   --  0.88 0.90   ANIONGAP  --  8  --   --  1* 6   ISSA  --  8.2*  --   --  8.4* 8.6   * 241* 200*   < > 159* 199*    < > = values in this interval not displayed.       EKG: Personally reviewed.    Sudarshan Pike MD  Internal  Medicine  [facility]  Phone: #[phone]

## 2021-09-18 NOTE — PLAN OF CARE
Problem: Infection  Goal: Absence of Infection Signs and Symptoms  Outcome: Improving  LLE remains in splinted ace wrap. Patient reports numb at knee and below from block.  Patient reports no pain. Awaiting cultures from surgery. IV antibiotics infusing as ordered. Afebrile.

## 2021-09-19 ENCOUNTER — APPOINTMENT (OUTPATIENT)
Dept: PHYSICAL THERAPY | Facility: CLINIC | Age: 71
DRG: 496 | End: 2021-09-19
Attending: ORTHOPAEDIC SURGERY
Payer: MEDICARE

## 2021-09-19 LAB
GLUCOSE BLDC GLUCOMTR-MCNC: 115 MG/DL (ref 70–99)
GLUCOSE BLDC GLUCOMTR-MCNC: 122 MG/DL (ref 70–99)
GLUCOSE BLDC GLUCOMTR-MCNC: 189 MG/DL (ref 70–99)
GLUCOSE BLDC GLUCOMTR-MCNC: 64 MG/DL (ref 70–99)
GLUCOSE BLDC GLUCOMTR-MCNC: 76 MG/DL (ref 70–99)
HGB BLD-MCNC: 10.1 G/DL (ref 13.3–17.7)
MAGNESIUM SERPL-MCNC: 1.7 MG/DL (ref 1.8–2.6)
POTASSIUM BLD-SCNC: 3.7 MMOL/L (ref 3.5–5)
VANCOMYCIN SERPL-MCNC: 19.4 MG/L

## 2021-09-19 PROCEDURE — 120N000001 HC R&B MED SURG/OB

## 2021-09-19 PROCEDURE — 97116 GAIT TRAINING THERAPY: CPT | Mod: GP

## 2021-09-19 PROCEDURE — 85018 HEMOGLOBIN: CPT | Performed by: ORTHOPAEDIC SURGERY

## 2021-09-19 PROCEDURE — 250N000012 HC RX MED GY IP 250 OP 636 PS 637: Performed by: HOSPITALIST

## 2021-09-19 PROCEDURE — 250N000011 HC RX IP 250 OP 636: Performed by: ORTHOPAEDIC SURGERY

## 2021-09-19 PROCEDURE — 99207 PR NO CHARGE LOS: CPT | Performed by: INTERNAL MEDICINE

## 2021-09-19 PROCEDURE — 96372 THER/PROPH/DIAG INJ SC/IM: CPT | Performed by: HOSPITALIST

## 2021-09-19 PROCEDURE — 258N000003 HC RX IP 258 OP 636: Performed by: ORTHOPAEDIC SURGERY

## 2021-09-19 PROCEDURE — 36415 COLL VENOUS BLD VENIPUNCTURE: CPT | Performed by: ORTHOPAEDIC SURGERY

## 2021-09-19 PROCEDURE — 99233 SBSQ HOSP IP/OBS HIGH 50: CPT | Performed by: HOSPITALIST

## 2021-09-19 PROCEDURE — 83735 ASSAY OF MAGNESIUM: CPT | Performed by: HOSPITALIST

## 2021-09-19 PROCEDURE — 250N000013 HC RX MED GY IP 250 OP 250 PS 637: Performed by: HOSPITALIST

## 2021-09-19 PROCEDURE — 250N000011 HC RX IP 250 OP 636: Performed by: HOSPITALIST

## 2021-09-19 PROCEDURE — 250N000013 HC RX MED GY IP 250 OP 250 PS 637: Performed by: ORTHOPAEDIC SURGERY

## 2021-09-19 PROCEDURE — 84132 ASSAY OF SERUM POTASSIUM: CPT | Performed by: HOSPITALIST

## 2021-09-19 PROCEDURE — 36415 COLL VENOUS BLD VENIPUNCTURE: CPT | Performed by: HOSPITALIST

## 2021-09-19 PROCEDURE — 97530 THERAPEUTIC ACTIVITIES: CPT | Mod: GP

## 2021-09-19 PROCEDURE — 80202 ASSAY OF VANCOMYCIN: CPT | Performed by: ORTHOPAEDIC SURGERY

## 2021-09-19 RX ORDER — ACETAMINOPHEN 500 MG
1000 TABLET ORAL EVERY 8 HOURS
COMMUNITY
Start: 2021-09-19

## 2021-09-19 RX ORDER — AMOXICILLIN 250 MG
1 CAPSULE ORAL 2 TIMES DAILY PRN
COMMUNITY
Start: 2021-09-19 | End: 2021-11-04

## 2021-09-19 RX ADMIN — ACETAMINOPHEN 1000 MG: 500 TABLET, FILM COATED ORAL at 12:41

## 2021-09-19 RX ADMIN — PIPERACILLIN AND TAZOBACTAM 3.38 G: 3; .375 INJECTION, POWDER, LYOPHILIZED, FOR SOLUTION INTRAVENOUS at 12:41

## 2021-09-19 RX ADMIN — FUROSEMIDE 20 MG: 10 INJECTION, SOLUTION INTRAMUSCULAR; INTRAVENOUS at 00:56

## 2021-09-19 RX ADMIN — DOCUSATE SODIUM 50MG AND SENNOSIDES 8.6MG 1 TABLET: 8.6; 5 TABLET, FILM COATED ORAL at 20:55

## 2021-09-19 RX ADMIN — VANCOMYCIN HYDROCHLORIDE 1250 MG: 5 INJECTION, POWDER, LYOPHILIZED, FOR SOLUTION INTRAVENOUS at 05:53

## 2021-09-19 RX ADMIN — ASPIRIN 81 MG: 81 TABLET, DELAYED RELEASE ORAL at 20:55

## 2021-09-19 RX ADMIN — PIPERACILLIN AND TAZOBACTAM 3.38 G: 3; .375 INJECTION, POWDER, LYOPHILIZED, FOR SOLUTION INTRAVENOUS at 04:45

## 2021-09-19 RX ADMIN — ACETAMINOPHEN 1000 MG: 500 TABLET, FILM COATED ORAL at 20:55

## 2021-09-19 RX ADMIN — INSULIN GLARGINE 30 UNITS: 100 INJECTION, SOLUTION SUBCUTANEOUS at 21:26

## 2021-09-19 RX ADMIN — ACETAMINOPHEN 1000 MG: 500 TABLET, FILM COATED ORAL at 04:43

## 2021-09-19 RX ADMIN — FUROSEMIDE 20 MG: 20 TABLET ORAL at 17:30

## 2021-09-19 RX ADMIN — METFORMIN HYDROCHLORIDE 1000 MG: 500 TABLET, FILM COATED ORAL at 08:34

## 2021-09-19 RX ADMIN — LOSARTAN POTASSIUM 50 MG: 50 TABLET, FILM COATED ORAL at 20:55

## 2021-09-19 RX ADMIN — ASPIRIN 81 MG: 81 TABLET, DELAYED RELEASE ORAL at 08:34

## 2021-09-19 RX ADMIN — INSULIN GLARGINE 30 UNITS: 100 INJECTION, SOLUTION SUBCUTANEOUS at 08:34

## 2021-09-19 RX ADMIN — ATORVASTATIN CALCIUM 40 MG: 40 TABLET, FILM COATED ORAL at 20:55

## 2021-09-19 RX ADMIN — VANCOMYCIN HYDROCHLORIDE 1250 MG: 5 INJECTION, POWDER, LYOPHILIZED, FOR SOLUTION INTRAVENOUS at 17:30

## 2021-09-19 RX ADMIN — METFORMIN HYDROCHLORIDE 1000 MG: 500 TABLET, FILM COATED ORAL at 17:30

## 2021-09-19 RX ADMIN — PIPERACILLIN AND TAZOBACTAM 3.38 G: 3; .375 INJECTION, POWDER, LYOPHILIZED, FOR SOLUTION INTRAVENOUS at 20:57

## 2021-09-19 NOTE — PROGRESS NOTES
Brief ID Follow-up Note    Chart reviewed. Cultures still pending    Continue vancomycin and pipercillin/tazobactam  Waiting on cultures before making final plan    ID will follow    Michael Marlow MD  [facility]  Direct messaging: [facility] Paging  On-Call ID provider: [phone], option: [phone]

## 2021-09-19 NOTE — PROGRESS NOTES
[facility] MEDICINE CONSULT PROGRESS NOTE      Identification/Summary:   Reilly Borja is a 71 year old old male with a history of IDDM2, HTN, HLD, NAFLD, HFpEF, OA underwent left foot hardware removal and irrigation and debridement and biopsy with general anesthesia. [facility] service was asked to evaluate patient for postoperative medical management as follows below. Please resume the home medications as reconciled and further noted below with ordered hold parameters.  Vital signs have been stable post-operatively including hemodynamically stable blood pressure and heart rate. Thank you for this consult; we will continue to follow this patient until discharge.    Assessment and Plan  Shortness of Breath  HTN  HFpEF - acute on chronic  -Order home medication losartan with the written tiered hold parameters given risk for post-operative hypotension. Given ACEi/ARB/diuretic medication, ordered creatine, potassium, and magnesium to evaluate renal function and electrolytes, respectively.   -BNP was elevated to 460  -IV lasix 20 mg IV BID and transition back to home PO once breathing back to baseline, possibly later today vs tomorrow      DM2  -Reorder home regiment, home metformin, home Lantus 30 U at bedtime. Continue mealtime ISS.   -Carb-controlled/diabetic diet when not NPO.  -Re-assess daily based on BG trend needs.  -BG post-prandial goal < 180. BG AM fasting goal < 130.      HLD  NAFLD  -Order home statin.     S/p left foot hardware removal and irrigation and debridement   Infected Hardware  Acute Post-Op Pain  -Agree with current pain control regimen; consider acute pain team consultation if increasingly difficult to control or proves refractory.   -PT evaluation.   -OT evaluation.  -IS frequently, initially every hour while awake as tolerated. Directly encouraged and discussed.   -Appreciate ID consultation for antibiotics.   -Follow-up intra-operative culture results and tailor  "antibiotics based on biopsy and culture s/s.     Post-Op DVT Prophylaxis  -As per primary surgery team.    Sudarshan Pike MD  Internal Medicine  [place] [facility]ist  [facility]  Phone: #[phone]      Interval History/Subjective:  NAEO.     BNP up to almost 500. IV lasix utilized and ordered. SOB and breathing at rest improving. He is diuresing \"a lot and filled up two of those\" (urinals) already. I/O's likely not accurate. Weight today hs not been obtained yet. Discussed mild acute on chronic HFpEF and diuresis, which he is responding to. Discussed plan of care with patient. Answered all questons to patient's verbalized and stated understanding and satisfaction. No chest pain or other new complaints.     Physical Exam/Objective:  Temp:  [97.4  F (36.3  C)-98.1  F (36.7  C)] 97.9  F (36.6  C)  Pulse:  [76-81] 80  Resp:  [18] 18  BP: (146-158)/(72-88) 158/87  SpO2:  [94 %-95 %] 95 %  Body mass index is 34.02 kg/m .    GENERAL:  Alert, appears comfortable, in no acute distress, appears stated age   HEAD:  Normocephalic, without obvious abnormality, atraumatic   EYES:  PERRL, conjunctiva/corneas clear, no scleral icterus, EOM's intact   NOSE: Nares normal, septum midline, mucosa normal, no drainage   THROAT: Lips, mucosa, and tongue normal; teeth and gums normal, mouth moist   NECK: Supple, symmetrical, trachea midline   BACK:   Symmetric, no curvature, ROM normal   LUNGS:   Clear to auscultation bilaterally, no rales, rhonchi, or wheezing, symmetric chest rise on inhalation, respirations unlabored   CHEST WALL:  No tenderness or deformity   HEART:  Regular rate and rhythm, S1 and S2 normal, no murmur, rub, or gallop    ABDOMEN:   Soft, non-tender, bowel sounds active all four quadrants, no masses, no organomegaly, no rebound or guarding   EXTREMITIES: Extremities normal, atraumatic, no cyanosis or edema    SKIN: Dry to touch, no exanthems in the visualized areas   NEURO: Alert, oriented x3, moves " all four extremities freely   PSYCH: Cooperative, behavior is appropriate      Medications:   Personally Reviewed.  Medications     lactated ringers Stopped (09/17/21 2025)       acetaminophen  1,000 mg Oral Q8H     aspirin  81 mg Oral BID     atorvastatin  40 mg Oral At Bedtime     furosemide  20 mg Intravenous Q12H     [Held by provider] furosemide  20 mg Oral BID     insulin aspart  1-7 Units Subcutaneous TID AC     insulin glargine  30 Units Subcutaneous BID     losartan  50 mg Oral At Bedtime     metFORMIN  1,000 mg Oral BID w/meals     piperacillin-tazobactam  3.375 g Intravenous Q8H     polyethylene glycol  17 g Oral Daily     senna-docusate  1 tablet Oral BID     sodium chloride (PF)  3 mL Intracatheter Q8H     vancomycin  1,250 mg Intravenous Q12H       Data reviewed today: I personally reviewed all new medications, labs, imaging/diagnostics reports over the past 24 hours. Pertinent findings include:    Imaging:   Recent Results (from the past 24 hour(s))   XR Chest Port 1 View    Narrative    EXAM: XR CHEST PORT 1 VIEW  LOCATION: [facility]  DATE/TIME: 9/18/2021 11:00 AM    INDICATION: Shortness of breath. Post-op. Has history of pulmonary edema.  COMPARISON: 05/29/2021 CT.      Impression    IMPRESSION: Lungs are mildly hypoexpanded. Small right pleural effusion and mild right base opacities, probably atelectasis. Mild interstitial prominence, exaggerated by technique (cannot exclude mild edema). Mildly enlarged cardiac silhouette,   exaggerated by technique. No pneumothorax.           Labs:  Most Recent 3 CBC's:  Recent Labs   Lab Test 09/19/21  0534 09/18/21  0531 09/16/21  1016 05/28/21  2317 11/16/19  1004 11/16/19  1004   WBC  --   --  5.9 7.2  --  5.8   HGB 10.1* 9.6* 10.5* 11.5*   < > 14.2   MCV  --   --  84 90  --  92   PLT  --   --  327 360  --  203    < > = values in this interval not displayed.     Most Recent 3 BMP's:  Recent Labs   Lab Test 09/19/21  0554  09/19/21  0534 09/19/21  0533 09/18/21  2146 09/18/21  1744 09/18/21  1307 09/18/21  0631 09/18/21  0531 09/17/21  1143 09/16/21  1016 06/03/21  1347   0000   NA  --   --   --   --   --   --   --  135*  --  135 135  --    POTASSIUM  --  3.7  --   --   --  4.7  --  4.3  --  4.2 4.3   < >   CHLORIDE  --   --   --   --   --   --   --  96*  --  99 96  --    CO2  --   --   --   --   --   --   --  31  --  35* 33*  --    BUN  --   --   --   --   --   --   --  18  --  19 14  --    CR  --   --   --   --   --   --   --  0.77  --  0.88 0.90  --    ANIONGAP  --   --   --   --   --   --   --  8  --  1* 6  --    ISSA  --   --   --   --   --   --   --  8.2*  --  8.4* 8.6  --    GLC 76  --  64* 188*   < >  --    < > 241*   < > 159* 199*   < >    < > = values in this interval not displayed.       EKG: Personally reviewed.

## 2021-09-19 NOTE — CONSULTS
Chart reviewed per consult order.  PT is recommending home upon discharge.  No discharge needs identified.  Anticipate family transport.    Adelaida Cardona, YAYO  9/19/2021  9:15 AM

## 2021-09-19 NOTE — UTILIZATION REVIEW
Inpatient appropriate    Admission Status; Secondary Review Determination       Under the authority of the Utilization Management Committee, the utilization review process indicated a secondary review on the above patient. The review outcome is based on review of the medical records, discussions with staff, and applying clinical experience noted on the date of the review.     (x) Inpatient Status Appropriate - This patient's medical care is consistent with medical management for inpatient care and reasonable inpatient medical practice.     RATIONALE FOR DETERMINATION   71 year old old male with a history of IDDM2, HTN, HLD, NAFLD, HFpEF, OA underwent left foot hardware removal and irrigation and debridement and biopsy due to infected hardware.  Infectious disease was consulted.  Patient currently on IV Zosyn and vancomycin.  Patient will remain on IV antibiotic and to surgical culture is available.  Hospital course also complicated with acute respiratory failure due to volume overload.  Patient received extra IV Lasix on top of her oral daily Lasix.  And has been hospitalized more than 48Hr.  He will remain hospitalized and receive inpatient treatment for his ongoing infection and respiratory failure    At the time of admission with the information available to the attending physician more than 2 nights Hospital complex care was anticipated, based on patient risk of adverse outcome if treated as outpatient and complex care required. Inpatient admission is appropriate based on the Medicare guidelines.     The information on this document is developed by the utilization review team in order for the business office to ensure compliance. This only denotes the appropriateness of proper admission status and does not reflect the quality of care rendered.   The definitions of Inpatient Status and Observation Status used in making the determination above are those provided in the CMS Coverage Manual, Chapter 1 and Chapter 6,  section 70.4.   Sincerely,   Remy Sandy MD  Utilization Review  Physician Advisor  [facility].

## 2021-09-19 NOTE — PROGRESS NOTES
"Orthopedic Progress Note      Assessment: 2 Days Post-Op  S/P Procedure(s):  REMOVAL OF HARDWARE LEFT FOOT DEEP, IRRIGATION AND DEBRIDEMENT AND BONE BIOPSY LEFT FOOT    Plan:   - Continue PT/OT  - Weightbearing status: NWB LLE  - Anticoagulation: ASA 81mg BID in addition to SCDs, yony stockings and early ambulation.  - Discharge planning: Home pending final cultures/abx plan      Subjective:  Pain: Minimal  Nausea, Vomiting:  None  Lightheadedness, Dizziness:  None  Neuro:  Patient denies new onset numbness or paresthesias    Patient is doing well today. He reports minimal pain in the foot. Doing well with NWB status. No other complaints.     Objective:  BP (!) 158/88 (BP Location: Left arm)   Pulse 76   Temp 98.1  F (36.7  C) (Oral)   Resp 18   Ht 1.88 m (6' 2\")   Wt 120.2 kg (265 lb)   SpO2 95%   BMI 34.02 kg/m      The patient is A&Ox3. Appears comfortable.   Sensation is intact.  Appropriate flexion and extension of the toes.   Brisk capillary refill.   The incision is covered. Dressing C/D/I.      Pertinent Labs   Lab Results: personally reviewed.   No results found for: INR, PROTIME  Lab Results   Component Value Date    WBC 5.9 09/16/2021    HGB 10.1 (L) 09/19/2021    HCT 33.5 (L) 09/16/2021    MCV 84 09/16/2021     09/16/2021     Lab Results   Component Value Date     (L) 09/18/2021    CO2 31 09/18/2021         Report completed by:  Kia Rivera PA-C, SAHRA  Date: 9/19/2021  Time: 7:45 AM        "

## 2021-09-19 NOTE — PROGRESS NOTES
"Pharmacy Vancomycin Initial Note  Date of Service 2021  Patient's  1950  71 year old, male    Indication: foot hardware infection    Current estimated CrCl = Estimated Creatinine Clearance: 123 mL/min (based on SCr of 0.77 mg/dL).    Creatinine for last 3 days  2021:  5:31 AM Creatinine 0.77 mg/dL    Recent Vancomycin Level(s) for last 3 days  2021: 11:40 AM Vancomycin 19.4 mg/L      Vancomycin IV Administrations (past 72 hours)                   vancomycin 1250 mg in 0.9% NaCl 250 mL intermittent infusion 1,250 mg (mg) 1,250 mg New Bag 21 0553     1,250 mg New Bag 21 1758     1,250 mg New Bag  0640    vancomycin 1750 mg in 0.9% NaCl 500 ml intermittent infusion 1,750 mg (mg) 1,750 mg Given 21 1751                Nephrotoxins and other renal medications (From now, onward)    Start     Dose/Rate Route Frequency Ordered Stop    21 0600  vancomycin 1250 mg in 0.9% NaCl 250 mL intermittent infusion 1,250 mg      1,250 mg  over 90 Minutes Intravenous EVERY 12 HOURS 21 1720      21 2100  piperacillin-tazobactam (ZOSYN) 3.375 g vial to attach to  mL bag     Note to Pharmacy: For [facility], [facility] and [facility]: For Zosyn-naive patients, use the \"Zosyn initial dose + extended infusion\" order panel.    3.375 g  over 240 Minutes Intravenous EVERY 8 HOURS 21 1703      21 1800  furosemide (LASIX) tablet 20 mg      20 mg Oral 2 TIMES DAILY. 21 1650            Contrast Orders - past 72 hours (72h ago, onward)    None            Regimen: 1250 mg IV every 12 hours.  Start time: 17:53 on 2021  Exposure target: AUC24 (range)400-600 mg/L.hr   AUC24,ss: 482 mg/L.hr  Probability of AUC24 > 400: 83 %  Ctrough,ss: 15.4 mg/L  Probability of Ctrough,ss > 20: 19 %  Probability of nephrotoxicity (Lodise SHANNAN ): 11 %  Plan:  1. Continue 1250mg iv q12hr level checked  am.... Above [facility]ight extrapolates    2. Pharmacist continue to follow    Willy" Manuel, RPH

## 2021-09-20 ENCOUNTER — HOME INFUSION (PRE-WILLOW HOME INFUSION) (OUTPATIENT)
Dept: PHARMACY | Facility: CLINIC | Age: 71
End: 2021-09-20

## 2021-09-20 ENCOUNTER — APPOINTMENT (OUTPATIENT)
Dept: PHYSICAL THERAPY | Facility: CLINIC | Age: 71
DRG: 496 | End: 2021-09-20
Attending: ORTHOPAEDIC SURGERY
Payer: MEDICARE

## 2021-09-20 VITALS
DIASTOLIC BLOOD PRESSURE: 87 MMHG | HEART RATE: 92 BPM | SYSTOLIC BLOOD PRESSURE: 178 MMHG | BODY MASS INDEX: 34.01 KG/M2 | WEIGHT: 265 LBS | HEIGHT: 74 IN | OXYGEN SATURATION: 94 % | TEMPERATURE: 98.2 F | RESPIRATION RATE: 20 BRPM

## 2021-09-20 LAB
BACTERIA BONE ANAEROBE+AEROBE CULT: ABNORMAL
BACTERIA BONE ANAEROBE+AEROBE CULT: NORMAL
BACTERIA SPEC CULT: ABNORMAL
BACTERIA SPEC CULT: NORMAL
GLUCOSE BLDC GLUCOMTR-MCNC: 121 MG/DL (ref 70–99)
GLUCOSE BLDC GLUCOMTR-MCNC: 52 MG/DL (ref 70–99)
GLUCOSE BLDC GLUCOMTR-MCNC: 67 MG/DL (ref 70–99)
GLUCOSE BLDC GLUCOMTR-MCNC: 73 MG/DL (ref 70–99)
MAGNESIUM SERPL-MCNC: 1.7 MG/DL (ref 1.8–2.6)
POTASSIUM BLD-SCNC: 3.6 MMOL/L (ref 3.5–5)

## 2021-09-20 PROCEDURE — 99233 SBSQ HOSP IP/OBS HIGH 50: CPT

## 2021-09-20 PROCEDURE — 36415 COLL VENOUS BLD VENIPUNCTURE: CPT | Performed by: HOSPITALIST

## 2021-09-20 PROCEDURE — 99232 SBSQ HOSP IP/OBS MODERATE 35: CPT | Performed by: HOSPITALIST

## 2021-09-20 PROCEDURE — 250N000013 HC RX MED GY IP 250 OP 250 PS 637: Performed by: ORTHOPAEDIC SURGERY

## 2021-09-20 PROCEDURE — 250N000011 HC RX IP 250 OP 636

## 2021-09-20 PROCEDURE — 250N000011 HC RX IP 250 OP 636: Performed by: ORTHOPAEDIC SURGERY

## 2021-09-20 PROCEDURE — 258N000003 HC RX IP 258 OP 636: Performed by: ORTHOPAEDIC SURGERY

## 2021-09-20 PROCEDURE — 83735 ASSAY OF MAGNESIUM: CPT | Performed by: HOSPITALIST

## 2021-09-20 PROCEDURE — 250N000012 HC RX MED GY IP 250 OP 636 PS 637: Performed by: HOSPITALIST

## 2021-09-20 PROCEDURE — 84132 ASSAY OF SERUM POTASSIUM: CPT | Performed by: HOSPITALIST

## 2021-09-20 PROCEDURE — 250N000009 HC RX 250

## 2021-09-20 PROCEDURE — 97116 GAIT TRAINING THERAPY: CPT | Mod: GP

## 2021-09-20 PROCEDURE — 250N000013 HC RX MED GY IP 250 OP 250 PS 637: Performed by: HOSPITALIST

## 2021-09-20 PROCEDURE — 97530 THERAPEUTIC ACTIVITIES: CPT | Mod: GP

## 2021-09-20 PROCEDURE — 272N000450 HC KIT 4FR POWER PICC SINGLE LUMEN

## 2021-09-20 RX ORDER — CEFAZOLIN SODIUM 2 G/100ML
2 INJECTION, SOLUTION INTRAVENOUS EVERY 8 HOURS
Status: DISCONTINUED | OUTPATIENT
Start: 2021-09-20 | End: 2021-09-20 | Stop reason: HOSPADM

## 2021-09-20 RX ORDER — CEFAZOLIN SODIUM 2 G/100ML
2 INJECTION, SOLUTION INTRAVENOUS EVERY 8 HOURS
Qty: 12000 ML | Refills: 0
Start: 2021-09-20 | End: 2021-10-30

## 2021-09-20 RX ORDER — OXYCODONE HYDROCHLORIDE 5 MG/1
5 TABLET ORAL EVERY 6 HOURS PRN
Qty: 10 TABLET | Refills: 0 | Status: SHIPPED | OUTPATIENT
Start: 2021-09-20 | End: 2021-11-04

## 2021-09-20 RX ORDER — LIDOCAINE 40 MG/G
CREAM TOPICAL
Status: DISCONTINUED | OUTPATIENT
Start: 2021-09-20 | End: 2021-09-20 | Stop reason: HOSPADM

## 2021-09-20 RX ORDER — BENZOCAINE/MENTHOL 6 MG-10 MG
LOZENGE MUCOUS MEMBRANE 3 TIMES DAILY
Status: DISCONTINUED | OUTPATIENT
Start: 2021-09-20 | End: 2021-09-20 | Stop reason: HOSPADM

## 2021-09-20 RX ADMIN — ASPIRIN 81 MG: 81 TABLET, DELAYED RELEASE ORAL at 09:16

## 2021-09-20 RX ADMIN — DEXTROSE 15 G: 15 GEL ORAL at 03:25

## 2021-09-20 RX ADMIN — HYDROCORTISONE: 1 CREAM TOPICAL at 16:20

## 2021-09-20 RX ADMIN — VANCOMYCIN HYDROCHLORIDE 1250 MG: 5 INJECTION, POWDER, LYOPHILIZED, FOR SOLUTION INTRAVENOUS at 05:29

## 2021-09-20 RX ADMIN — PIPERACILLIN AND TAZOBACTAM 3.38 G: 3; .375 INJECTION, POWDER, LYOPHILIZED, FOR SOLUTION INTRAVENOUS at 05:29

## 2021-09-20 RX ADMIN — PIPERACILLIN AND TAZOBACTAM 3.38 G: 3; .375 INJECTION, POWDER, LYOPHILIZED, FOR SOLUTION INTRAVENOUS at 13:06

## 2021-09-20 RX ADMIN — INSULIN GLARGINE 30 UNITS: 100 INJECTION, SOLUTION SUBCUTANEOUS at 09:16

## 2021-09-20 RX ADMIN — FUROSEMIDE 20 MG: 20 TABLET ORAL at 09:16

## 2021-09-20 RX ADMIN — CEFAZOLIN SODIUM 2 G: 2 INJECTION, SOLUTION INTRAVENOUS at 16:10

## 2021-09-20 RX ADMIN — LIDOCAINE HYDROCHLORIDE 2 ML: 10 INJECTION, SOLUTION INFILTRATION; PERINEURAL at 15:00

## 2021-09-20 RX ADMIN — ACETAMINOPHEN 1000 MG: 500 TABLET, FILM COATED ORAL at 03:33

## 2021-09-20 RX ADMIN — OXYCODONE HYDROCHLORIDE 5 MG: 5 TABLET ORAL at 17:28

## 2021-09-20 RX ADMIN — ACETAMINOPHEN 1000 MG: 500 TABLET, FILM COATED ORAL at 13:05

## 2021-09-20 RX ADMIN — METFORMIN HYDROCHLORIDE 1000 MG: 500 TABLET, FILM COATED ORAL at 09:16

## 2021-09-20 ASSESSMENT — ACTIVITIES OF DAILY LIVING (ADL): DEPENDENT_IADLS:: INDEPENDENT

## 2021-09-20 NOTE — PLAN OF CARE
Pt is alert & pleasant & able to make needs known. Pt states the numbness has mostly resolved from his operative foot. Yet he had difficulty differentiating which toe I was touching when tested. His capillary refill of the left toes was wnl and the toes were warm. Pt is awaiting results of cultures taken from his foot to determine further course of treatment.

## 2021-09-20 NOTE — DISCHARGE INSTRUCTIONS
Home care services have been arranged for you.  Agency: [facility]  Services: Infusion Therapy (IV antibiotic- medication/supplies) and Skilled Nursing   Phone: [phone]    Instructions: Teaching was completed at hospital prior to discharge. Medication and supplies will be shipped to your home prior to next dose.

## 2021-09-20 NOTE — PROGRESS NOTES
"Orthopedic Progress Note      Assessment: 3 Days Post-Op  S/P Procedure(s):  REMOVAL OF HARDWARE LEFT FOOT DEEP, IRRIGATION AND DEBRIDEMENT AND BONE BIOPSY LEFT FOOT     Plan:   - Continue PT/OT  - Weightbearing status: NWB LLE  - Anticoagulation: ASA 81 PO BID in addition to SCDs, yony stockings and early ambulation.  - PT is recommending knee scooter to help patient continue to be fully NWB  - Continue to follow culture, appreciate ID input  - Discharge planning: Home pending final abx plan      Subjective:  Pain: mild  Nausea, Vomiting:  No  Lightheadedness, Dizziness:  No  Neuro:  Patient denies new onset numbness or paresthesias    Patient is doing well today. He just worked with therapy for the knee scooter. He understands having to wait for ID and the abx plan before being able to discharge. No other complaints.     Objective:  BP (!) 162/82 (BP Location: Right arm)   Pulse 92   Temp 97.4  F (36.3  C) (Oral)   Resp 18   Ht 1.88 m (6' 2\")   Wt 120.2 kg (265 lb)   SpO2 93%   BMI 34.02 kg/m    The patient is A&Ox3. Appears comfortable.   Sensation is intact.  Able to wiggle his toes  Brisk capillary refill  The incision is covered. Dressing C.D.I.    Pertinent Labs   Lab Results: personally reviewed.   No results found for: INR, PROTIME  Lab Results   Component Value Date    WBC 5.9 09/16/2021    HGB 10.1 (L) 09/19/2021    HCT 33.5 (L) 09/16/2021    MCV 84 09/16/2021     09/16/2021     Lab Results   Component Value Date     (L) 09/18/2021    CO2 31 09/18/2021         Report completed by:  Mee Dillard PA-C, SAHRA  Date: 9/20/2021  Time: 9:35 AM    "

## 2021-09-20 NOTE — SIGNIFICANT EVENT
Patient called and complained of feeling lightheaded and diaphoretic. Blood sugar check was 52. Gave juice, crackers, and glucose gel 15g. Notified Dr. Lugo. Recheck blood sugar 121 and patient reports absence of symptoms. Will continue to monitor.    Marcia Whiting RN

## 2021-09-20 NOTE — PROGRESS NOTES
Patient and wife were explained discharge instructions. Pt is aware of follow up appointments.  Pt received first dose of ancef and is aware to give himself a dose tonight.  PICC patent and flushed.  Dressing to left foot re ACE wrapped as some sanguinous dressing was coming through.  Pt discharging to home with wife transporting.

## 2021-09-20 NOTE — PLAN OF CARE
Problem: Pain (Surgery Nonspecified)  Goal: Acceptable Pain Control  Outcome: Improving  Intervention: Prevent or Manage Pain  Recent Flowsheet Documentation  Taken 9/20/2021 0030 by Marcia Whiting RN  Pain Management Interventions: pillow support provided     Sensation in surgical foot improving. Denies pain. NWB to left foot. Blood sugar overnight dropped to 52. Gave juice and glucose gel and blood sugar recheck was 121. Patient states he has been experiencing low blood sugars during the night. IV abx infused.

## 2021-09-20 NOTE — PROGRESS NOTES
[facility] MEDICINE CONSULT PROGRESS NOTE      Identification/Summary:   Reilly Borja is a 71 year old old male with a history of IDDM2, HTN, HLD, NAFLD, HFpEF, OA underwent left foot hardware removal and irrigation and debridement and biopsy with general anesthesia. [facility] [place] asked to evaluate patient for postoperative medical management as follows below. Please resume the home medications as reconciled and further noted below with ordered hold parameters.  Vital signs have been stable post-operatively including hemodynamically stable blood pressure and heart rate. Thank you for this consult; we will continue to follow this patient until discharge.    Assessment and Plan  Shortness of Breath  HTN  HFpEF - acute on chronic, resolved  -Order home medication losartan with the written tiered hold parameters given risk for post-operative hypotension. Given ACEi/ARB/diuretic medication, ordered creatine, potassium, and magnesium to evaluate renal function and electrolytes, respectively.   -BNP was elevated to 460  -S/p IV lasix 20 mg IV BID.  -Now transitioned back to home PO as breathing back to baseline clinically.     DM2  -Reorder home regiment, home metformin, home Lantus 30 U at bedtime. Continue mealtime ISS.   -Carb-controlled/diabetic diet when not NPO.  -Re-assess daily based on BG trend needs.  -BG post-prandial goal < 180. BG AM fasting goal < 130.      HLD  NAFLD  -Order home statin.     S/p left foot hardware removal and irrigation and debridement   Infected Hardware  Acute Post-Op Pain  -Agree with current pain control regimen; consider acute pain team consultation if increasingly difficult to control or proves refractory.   -PT evaluation.   -OT evaluation.  -IS frequently, initially every hour while awake as tolerated. Directly encouraged and discussed.   -Appreciate ID consultation for antibiotics.   -Follow-up intra-operative culture results and tailor antibiotics  based on biopsy and culture s/s.     Post-Op DVT Prophylaxis  -As per primary surgery team.    Sudarshan Pike MD  Internal Medicine  [facility]ist  [facility]  Phone: #[phone]      Interval History/Subjective:  No acute events overnight.    No chest pain, no shortness of breath. No other new complaints. He feels his breathing is back to baseline. He reports that he is very bored here while awaiting cultures.     Discussed plan of care with patient. Answered all questons to patient's verbalized and stated understanding and satisfaction.      Physical Exam/Objective:  Temp:  [97.4  F (36.3  C)-98  F (36.7  C)] 97.4  F (36.3  C)  Pulse:  [87-94] 92  Resp:  [18] 18  BP: (150-182)/(79-93) 162/82  SpO2:  [91 %-93 %] 93 %  Body mass index is 34.02 kg/m .    GENERAL:  Alert, appears comfortable, in no acute distress, appears stated age   HEAD:  Normocephalic, without obvious abnormality, atraumatic   EYES:  PERRL, conjunctiva/corneas clear, no scleral icterus, EOM's intact   NOSE: Nares normal, septum midline, mucosa normal, no drainage   THROAT: Lips, mucosa, and tongue normal; teeth and gums normal, mouth moist   NECK: Supple, symmetrical, trachea midline   BACK:   Symmetric, no curvature, ROM normal   LUNGS:   Clear to auscultation bilaterally, no rales, rhonchi, or wheezing, symmetric chest rise on inhalation, respirations unlabored   CHEST WALL:  No tenderness or deformity   HEART:  Regular rate and rhythm, S1 and S2 normal, no murmur, rub, or gallop    ABDOMEN:   Soft, non-tender, bowel sounds active all four quadrants, no masses, no organomegaly, no rebound or guarding   EXTREMITIES: Left LE and foot well-wrapped and covered, no purulence or drainage or bleeding seen or noted; otherwise extremities normal, atraumatic, no cyanosis or edema    SKIN: Dry to touch, no exanthems in the visualized areas   NEURO: Alert, oriented x3, moves all four extremities freely   PSYCH: Cooperative, behavior  is appropriate      Medications:   Personally Reviewed.  Medications     lactated ringers Stopped (09/17/21 2025)       acetaminophen  1,000 mg Oral Q8H     aspirin  81 mg Oral BID     atorvastatin  40 mg Oral At Bedtime     furosemide  20 mg Oral BID     insulin aspart  1-7 Units Subcutaneous TID AC     insulin glargine  30 Units Subcutaneous BID     losartan  50 mg Oral At Bedtime     metFORMIN  1,000 mg Oral BID w/meals     piperacillin-tazobactam  3.375 g Intravenous Q8H     polyethylene glycol  17 g Oral Daily     senna-docusate  1 tablet Oral BID     sodium chloride (PF)  3 mL Intracatheter Q8H     vancomycin  1,250 mg Intravenous Q12H       Data reviewed today: I personally reviewed all new medications, labs, imaging/diagnostics reports over the past 24 hours. Pertinent findings include:    Imaging:   No results found for this or any previous visit (from the past 24 hour(s)).    Labs:  Most Recent 3 CBC's:  Recent Labs   Lab Test 09/19/21  0534 09/18/21  0531 09/16/21  1016 05/28/21  2317 11/16/19  1004 11/16/19  1004   WBC  --   --  5.9 7.2  --  5.8   HGB 10.1* 9.6* 10.5* 11.5*   < > 14.2   MCV  --   --  84 90  --  92   PLT  --   --  327 360  --  203    < > = values in this interval not displayed.     Most Recent 3 BMP's:  Recent Labs   Lab Test 09/20/21  0513 09/19/21  2031 09/19/21  1623 09/19/21  1121 09/19/21  0554 09/19/21  0534 09/18/21  1744 09/18/21  1307 09/18/21  0631 09/18/21  0531 09/17/21  1143 09/16/21  1016 06/03/21  1347   0000   NA  --   --   --   --   --   --   --   --   --  135*  --  135 135  --    POTASSIUM 3.6  --   --   --   --  3.7  --  4.7   < > 4.3  --  4.2 4.3   < >   CHLORIDE  --   --   --   --   --   --   --   --   --  96*  --  99 96  --    CO2  --   --   --   --   --   --   --   --   --  31  --  35* 33*  --    BUN  --   --   --   --   --   --   --   --   --  18  --  19 14  --    CR  --   --   --   --   --   --   --   --   --  0.77  --  0.88 0.90  --    ANIONGAP  --   --   --   --    --   --   --   --   --  8  --  1* 6  --    ISSA  --   --   --   --   --   --   --   --   --  8.2*  --  8.4* 8.6  --    GLC  --  189* 122* 115*   < >  --    < >  --    < > 241*   < > 159* 199*   < >    < > = values in this interval not displayed.       EKG: Personally reviewed.

## 2021-09-20 NOTE — PLAN OF CARE
Problem: Infection  Goal: Absence of Infection Signs and Symptoms  Outcome: Improving   Antibiotics infusing as ordered. Afebrile. Up with assist of one with walker and gait belt. Patient is NWB and intermittently does not maintain NWB status to LLE. Small amount of bloody drainage present on ace wrap.

## 2021-09-20 NOTE — PROGRESS NOTES
Infectious Disease Progress Note    Assessment/Plan  ASSESSMENT   71-year-old male with a history of diabetes, hypertension, who was admitted for revision of Charcot foot repair, left.     1. Charcot foot, left.  Status post hardware placement for repair in March/April of this year.  Required revision surgery a few weeks later for loosening of hardware.  Interestingly had one of his bolts poking through the skin, requiring his physician to remove it in the clinic  2. Persistent foot pain following surgery.  [facility].  Underwent hardware removal on 9/17.  Unexpected finding of purulence.  Multiple cultures collected, including bone cultures all growing MSSlugdunensis.  No antibiotics or systemic signs of infection prior to procedure.          PLAN   Cefazolin 2 gms iv q 8 hours for 4 to 6 weeks via picc    Follow-up with us in 3 to 4 weeks.        Active Problems:    Essential hypertension    Type 2 diabetes mellitus with complication, with long-term current use of insulin (H)    Chronic heart failure, unspecified heart failure type (H)    Pain in left ankle    Nonalcoholic fatty liver disease    Pure hypercholesterolemia    Charcot's joint of left foot    Status post debridement      LYLA VAZQUEZ MD  [phone]      Subjective  Pain is better.  Complains of rash on back.   Objective    Vital signs in last 24 hours  Temp:  [97.4  F (36.3  C)-98  F (36.7  C)] 97.4  F (36.3  C)  Pulse:  [87-94] 92  Resp:  [18] 18  BP: (150-182)/(79-93) 162/82  SpO2:  [91 %-93 %] 93 %  Wt Readings from Last 3 Encounters:   09/17/21 120.2 kg (265 lb)   09/16/21 120.2 kg (265 lb)   06/03/21 120.2 kg (265 lb)           Intake/Output last 3 shifts  I/O last 3 completed shifts:  In: 710 [P.O.:710]  Out: 1800 [Urine:1800]  Intake/Output this shift:  I/O this shift:  In: 480 [P.O.:480]  Out: -     Review of Systems   Pertinent items are noted in HPI., otherwise negative.    Physical  Exam  GENERAL:  well-developed, well-nourished, sitting in bed in no acute distress.   HENT:  Head is normocephalic, atraumatic.   EYES:  Eyes have anicteric sclerae without conjunctival injection   LUNGS:  Clear to auscultation.  CARDIOVASCULAR:  Regular rate and rhythm with no murmurs, gallops or rubs.  ABDOMEN:  Normal bowel sounds, soft, nontender. No appreciable hepatosplenomegaly  EXT: Left foot is wrapped, right foot has some swelling, but not warm.   SKIN:  Rash on back compatible with miliaria  NEUROLOGIC:  Grossly nonfocal.    Pertinent Labs   Lab Results: personally reviewed.     Recent Labs   Lab 09/19/21  0534 09/18/21  0531 09/16/21  1016   WBC  --   --  5.9   HGB 10.1* 9.6* 10.5*   HCT  --   --  33.5*   PLT  --   --  327        Recent Labs   Lab 09/18/21  0531 09/16/21  1016   * 135   CO2 31 35*   BUN 18 19     No results found for: CULT  7-Day Micro Results    Collected Updated Procedure Result Status    09/17/2021 1517 09/20/2021 0716 Anaerobic Bacterial Culture Routine [[id number]]   Bone Biopsy from Foot, Left    Final result Component Value   Culture No anaerobic organisms isolated              09/17/2021 1517 09/17/2021 1925 Gram Stain [[id number]]   (Abnormal)   Bone Biopsy from Foot, Left    Final result Component Value   Gram Stain Result 2+ PMNsAbnormal    Gram Stain Result No organisms seenAbnormal            09/17/2021 1517 09/20/2021 0831 Bone Biopsy Aerobic Bacterial Culture Routine [[id number]]    (Abnormal)   Bone Biopsy from Foot, Left    Preliminary result Component Value   Culture 1+ Staphylococcus lugdunensisAbnormal  P         Susceptibility     Staphylococcus lugdunensis     EN     Cefazolin <=2 ug/mL Susceptible     Clindamycin <=0.5 ug/mL Susceptible     Doxycycline <=0.5 ug/mL Susceptible     Oxacillin 0.5 ug/mL Susceptible     Vancomycin 1 ug/mL Susceptible                  09/17/2021 1513 09/20/2021 0719 Anaerobic Bacterial Culture Routine [[id number]]   Swab  from Foot, Left    Final result Component Value   Culture No anaerobic organisms isolated              09/17/2021 1513 09/17/2021 1923 Gram Stain [[id number]]   (Abnormal)   Swab from Foot, Left    Final result Component Value   Gram Stain Result 1+ PMNsAbnormal    Gram Stain Result No organisms seenAbnormal            09/17/2021 1513 09/20/2021 1018 Swab Aerobic Bacterial Culture Routine [[id number]]   (Abnormal)   Swab from Foot, Left    Final result Component Value   Culture 1+ Staphylococcus lugdunensisAbnormal     Susceptibility testing done on previous culture.              09/17/2021 1501 09/20/2021 0718 Anaerobic Bacterial Culture Routine [[id number]]   Swab from Foot, Left    Final result Component Value   Culture No anaerobic organisms isolated              09/17/2021 1501 09/17/2021 1920 Gram Stain [[id number]]   (Abnormal)   Swab from Foot, Left    Final result Component Value   Gram Stain Result 3+ PMNsAbnormal    Gram Stain Result No organisms seenAbnormal            09/17/2021 1501 09/20/2021 1020 Swab Aerobic Bacterial Culture Routine [[id number]]   (Abnormal)   Swab from Foot, Left    Final result Component Value   Culture 2+ Staphylococcus lugdunensisAbnormal     Susceptibility testing done on previous culture.              09/17/2021 1456 09/20/2021 0717 Anaerobic Bacterial Culture Routine [[id number]]   Swab from Foot, Left    Final result Component Value   Culture No anaerobic organisms isolated              09/17/2021 1456 09/17/2021 1922 Gram Stain [[id number]]   (Abnormal)   Swab from Foot, Left    Final result Component Value   Gram Stain Result 3+ PMNsAbnormal    Gram Stain Result No organisms seenAbnormal            09/17/2021 1456 09/20/2021 1020 Swab Aerobic Bacterial Culture Routine [[id number]]   (Abnormal)   Swab from Foot, Left    Final result Component Value   Culture 2+ Staphylococcus lugdunensisAbnormal     Susceptibility testing done on previous culture.               09/14/2021 1018 09/15/2021 1312 Asymptomatic COVID-19 Virus (Coronavirus) by PCR Nose [[id number]]    Swab from Nose            Pertinent Radiology   Radiology Results:   XR Chest Port 1 View    Result Date: 9/18/2021  EXAM: XR CHEST PORT 1 VIEW LOCATION: [facility] DATE/TIME: 9/18/2021 11:00 AM INDICATION: Shortness of breath. Post-op. Has history of pulmonary edema. COMPARISON: 05/29/2021 CT.     IMPRESSION: Lungs are mildly hypoexpanded. Small right pleural effusion and mild right base opacities, probably atelectasis. Mild interstitial prominence, exaggerated by technique (cannot exclude mild edema). Mildly enlarged cardiac silhouette, exaggerated by technique. No pneumothorax.     XR Surgery PROSPER  Fluoro G/T 5 Min    Result Date: 9/17/2021  This exam was marked as non-reportable because it will not be read by a radiologist or a [place] non-radiologist provider.

## 2021-09-20 NOTE — PROGRESS NOTES
Care Management Discharge Note    Discharge Date: 09/21/2021       Discharge Disposition: home with assist (S/O [place]) and [facility] for IV antibiotics and   Discharge Services:  ([facility] (IV antibiotics and Skilled Nursing))    Discharge DME:  (PICC line for IV antibiotics)    Discharge Transportation: family or friend will provide (S/O to transport)    Private pay costs discussed: [facility] reviewed coverage with patient. Medicare does not cover. VA form completed and faxed. VA authorized coverage for services.     Education Provided on the Discharge Plan:  CM reviewed plan for [facility], AVS per bedside RN  Persons Notified of Discharge Plans: patient and S/O  Patient/Family in Agreement with the Plan: yes    Handoff Referral Completed: coordinated with Reanna with [facility], hospitalist, infectious disease, ortho PA, patient and S/O    Additional Information:  Please see initial consult from Writer as well.     Did get approval from [facility] to cover services for home infusion.   Teaching was completed at the hospital prior to discharge.   S/O will provide the transportation home.     PICC line was placed and patient to receive one dose of IV antibiotics at hospital prior to discharge home.     Chanel Mustafa, RN

## 2021-09-20 NOTE — PLAN OF CARE
Problem: Pain (Surgery Nonspecified)  Goal: Acceptable Pain Control  Outcome: Improving  Intervention: Prevent or Manage Pain  Recent Flowsheet Documentation  Taken 9/20/2021 0900 by Safia Vizcarra RN  Pain Management Interventions: (elevated) --   Minimal pain 2/10.  Pt only wanted scheduled tylenol and elevation.  Slight numbness in surgical foot still.      Problem: Infection (Surgery Nonspecified)  Goal: Absence of Infection Signs and Symptoms  Outcome: Improving   Awaiting cultures and ID for final plan.  Continues on IV abx as ordered.

## 2021-09-20 NOTE — PROGRESS NOTES
[facility]    Referral received from Chanel Mustafa CM, for IV antibiotics.    Benefits verified. Pt does not have home IV antibiotic coverage with Medicare.  Cost for the Vanco 1250 mg and Zosyn 3.375 g q 8 hr is roughly $135/day for drugs and supplies.  Pt may have coverage under his VA benefit.  [facility] would need to get approval from the VA  to service this patient.  Proper paperwork will need to be submitted with the VA ([facility] will work with Chanel Mustafa CM, to get this paperwork submitted).    Writer will speak with pt to review benefits, home infusion and to offer choice of agency/home infusion provider.    Thank you for the referral.    Reanna Caputo RN, BSN  [facility] Liaison  [phone] (Mon through Fri, 8:00 am-5:00 pm)  [phone] (Office)    ADDENDUM:    Approval from the VA was received today at 1600.  Pt will have 100% coverage.    Met with patient and S.O. at bedside for teach of IV Ancef via PICC.  Plan is for patient to discharge on Ancef 2 g IV q 8 hrs.    Provided hands-on teaching using teaching mats and demo equipment.  Patient taught SAS  method and pt was able to provide return demonstration using aseptic technique.  Extension tubing was added to PICC.  IV catheter flushed without resistance and had good blood return.  Delivery of med and supplies will be delivered to patient's home between 8-10 pm.  A nurse  will contact patient to set up the first home nursing visit.    Provided 24/7 phone number and answered all questions.  Patient verbalized understanding of discharge plans.

## 2021-09-20 NOTE — PROCEDURES
"Procedures      PICC Line Insertion Procedure Note  Pt. Name: Reilly Borja  MRN:        [id number]    Procedure: Insertion of a  single Lumen  4 fr  Bard SOLO (valved) Power PICC, Lot number [id number]    Indications: Antibiotics    Contraindications : none    Procedure Details   Patient identified with 2 identifiers and \"Time Out\" conducted.  .     Central line insertion bundle followed: hand hygeine performed prior to procedure, site cleansed with cholraprep, hat, mask, sterile gloves,sterile gown worn, patient draped with maximum barrier head to toe drape, sterile field maintained.    Vein was assessed and found to be compressible and of adequate size. Two ml 1% Lidocaine administered sq to the insertion site.  Attempt to access basilic vein was initially made via ultrasound guidance, as needle was inserted into the basilic vein patient c/o ongoing pain. There was good visualization of the needle in the vein via ultrasound, however needle was removed and then a  4 Fr PICC was inserted into the brachial vein of the right arm with ultrasound guidance. One attempt(s) required to access vein.   Catheter threaded without difficulty. Good blood return noted.    Modified Seldinger Technique used for insertion.    The 8 sharps that are included in the PICC kit were accounted for and disposed of in the sharps container prior to the breakdown of the sterile field.     Catheter secured with Statlock, biopatch and Tegaderm dressing applied.    Findings:  Total catheter length  51 cm, with 0 cm exposed. Mid upper arm circumference is 36 cm. Catheter was flushed with 10 cc NS. Patient  tolerated procedure well.    Tip placement verified by 3 CG technology    CLABSI prevention brochure left at bedside.    Patient's primary RN notified PICC is ready for use.    Comments:                Vicky Mark, RN  PICC Staff    [facility]  [phone]      "

## 2021-09-20 NOTE — CONSULTS
Care Management Initial Consult    General Information  Assessment completed with: Patient,    Type of CM/SW Visit: Initial Assessment    Primary Care Provider verified and updated as needed:  (PCP is at [facility])   Readmission within the last 30 days:        Reason for Consult: discharge planning  Advance Care Planning:   Does not have HCD. Not interested at this time.          Communication Assessment  Patient's communication style: spoken language (English or Bilingual)    Hearing Difficulty or Deaf: no   Wear Glasses or Blind: yes    Cognitive  Cognitive/Neuro/Behavioral: WDL                      Living Environment:   People in home: significant other  [place]  Current living Arrangements: house      Able to return to prior arrangements: yes       Family/Social Support:  Care provided by: self  Provides care for: no one  Marital Status: Lives with Significant Other  Significant Other, Children (3 adult children- 2 daughters and a son)       [place]  Description of Support System: Supportive, Involved         Current Resources:   Patient receiving home care services: No     Community Resources: None  Equipment currently used at home:  (crutches since March, Rx glasses)  Supplies currently used at home: Diabetic Supplies    Employment/Financial:  Employment Status: employed full-time (owns business, , builds shops)     Employment/ Comments:  ([facility] affiliated- has been benefits )  Financial Concerns: other (see comments) (may not have coverage for home infusion )   Referral to Financial Counselor: No       Lifestyle & Psychosocial Needs:  Social Determinants of Health     Tobacco Use: Low Risk      Smoking Tobacco Use: Never Smoker     Smokeless Tobacco Use: Never Used   Alcohol Use:      Frequency of Alcohol Consumption:      Average Number of Drinks:      Frequency of Binge Drinking:    Financial Resource Strain:      Difficulty of Paying Living Expenses:    Food Insecurity:      Worried  About Running Out of Food in the Last Year:      Ran Out of Food in the Last Year:    Transportation Needs:      Lack of Transportation (Medical):      Lack of Transportation (Non-Medical):    Physical Activity:      Days of Exercise per Week:      Minutes of Exercise per Session:    Stress:      Feeling of Stress :    Social Connections:      Frequency of Communication with Friends and Family:      Frequency of Social Gatherings with Friends and Family:      Attends Evangelical Services:      Active Member of Clubs or Organizations:      Attends Club or Organization Meetings:      Marital Status:    Intimate Partner Violence:      Fear of Current or Ex-Partner:      Emotionally Abused:      Physically Abused:      Sexually Abused:    Depression: Not at risk     PHQ-2 Score: 0   Housing Stability:      Unable to Pay for Housing in the Last Year:      Number of Places Lived in the Last Year:      Unstable Housing in the Last Year:        Functional Status:  Prior to admission patient needed assistance:   Dependent ADLs:: Independent  Dependent IADLs:: Independent       Mental Health Status:          Chemical Dependency Status:                Values/Beliefs:  Spiritual, Cultural Beliefs, Evangelical Practices, Values that affect care: no               Additional Information:  Chart reviewed. CM met with patient and completed assessment.   Lives with his S/O [place] in private home. He is independent with all cares, including driving, still works full time. Wears Rx glasses and has been using crutches since March. Has diabetic supplies. [facility] [place] [facility] alternate [facility].     Anticipated need for IV antibiotics. LINDA discussed with patient. He has Medicare and VA benefits. Indicates the VA has authorized services for him recently outside of the [facility] and feels they will also authorize coverage for these services. He is not interested in TCU placement. He would like to avoid going to an  infusion center if able to get coverage for home infusion from VA.     CM sent referral to [facility] per patient request. [facility]. CM received [facility] request form for [facility] paid home IV antibiotic therapy form from [facility] and requested for Infectious Disease MD to complete. Final ID plan determined and ID MD completed form and CM faxed back to [facility].     PICC line to be placed. Will need one dose of Cefazolin at hospital prior to discharge. Reanna with [facility] will come to hospital and complete teaching yet this afternoon with patient and significant other [place]. Still waiting on VA approval for services.       Chanel Mustafa, RN

## 2021-09-21 ENCOUNTER — PATIENT OUTREACH (OUTPATIENT)
Dept: CARE COORDINATION | Facility: CLINIC | Age: 71
End: 2021-09-21

## 2021-09-21 DIAGNOSIS — Z71.89 OTHER SPECIFIED COUNSELING: ICD-10-CM

## 2021-09-21 NOTE — PROGRESS NOTES
Clinic Care Coordination Contact  [facility]/Voicemail       Clinical Data: Care Coordinator Outreach  Outreach attempted x 1.  Left message on patient's voicemail with call back information and requested return call.  Plan: Care Coordinator will try to reach patient again in 1-2 business days.    CAYETANO Felix  [phone]  [facility]

## 2021-09-23 ENCOUNTER — MEDICAL CORRESPONDENCE (OUTPATIENT)
Dept: HEALTH INFORMATION MANAGEMENT | Facility: CLINIC | Age: 71
End: 2021-09-23

## 2021-09-23 ENCOUNTER — LAB REQUISITION (OUTPATIENT)
Dept: LAB | Facility: CLINIC | Age: 71
End: 2021-09-23
Payer: MEDICARE

## 2021-09-23 ENCOUNTER — HOME INFUSION (PRE-WILLOW HOME INFUSION) (OUTPATIENT)
Dept: PHARMACY | Facility: CLINIC | Age: 71
End: 2021-09-23

## 2021-09-23 DIAGNOSIS — M14.672 CHARCOT'S JOINT, LEFT ANKLE AND FOOT: ICD-10-CM

## 2021-09-23 LAB
AST SERPL W P-5'-P-CCNC: 19 U/L (ref 0–45)
BASOPHILS # BLD AUTO: 0 10E3/UL (ref 0–0.2)
BASOPHILS NFR BLD AUTO: 1 %
BUN SERPL-MCNC: 12 MG/DL (ref 7–30)
CREAT SERPL-MCNC: 0.71 MG/DL (ref 0.66–1.25)
CRP SERPL-MCNC: 59 MG/L (ref 0–8)
EOSINOPHIL # BLD AUTO: 0.4 10E3/UL (ref 0–0.7)
EOSINOPHIL NFR BLD AUTO: 6 %
ERYTHROCYTE [DISTWIDTH] IN BLOOD BY AUTOMATED COUNT: 17.2 % (ref 10–15)
ERYTHROCYTE [SEDIMENTATION RATE] IN BLOOD BY WESTERGREN METHOD: 85 MM/HR (ref 0–20)
GFR SERPL CREATININE-BSD FRML MDRD: >90 ML/MIN/1.73M2
HCT VFR BLD AUTO: 33.8 % (ref 40–53)
HGB BLD-MCNC: 10.1 G/DL (ref 13.3–17.7)
HOLD SPECIMEN: NORMAL
IMM GRANULOCYTES # BLD: 0 10E3/UL
IMM GRANULOCYTES NFR BLD: 1 %
LYMPHOCYTES # BLD AUTO: 1.3 10E3/UL (ref 0.8–5.3)
LYMPHOCYTES NFR BLD AUTO: 20 %
MCH RBC QN AUTO: 25.6 PG (ref 26.5–33)
MCHC RBC AUTO-ENTMCNC: 29.9 G/DL (ref 31.5–36.5)
MCV RBC AUTO: 86 FL (ref 78–100)
MONOCYTES # BLD AUTO: 0.5 10E3/UL (ref 0–1.3)
MONOCYTES NFR BLD AUTO: 8 %
NEUTROPHILS # BLD AUTO: 4.1 10E3/UL (ref 1.6–8.3)
NEUTROPHILS NFR BLD AUTO: 64 %
NRBC # BLD AUTO: 0 10E3/UL
NRBC BLD AUTO-RTO: 0 /100
PLATELET # BLD AUTO: 355 10E3/UL (ref 150–450)
RBC # BLD AUTO: 3.95 10E6/UL (ref 4.4–5.9)
WBC # BLD AUTO: 6.4 10E3/UL (ref 4–11)

## 2021-09-23 PROCEDURE — 82565 ASSAY OF CREATININE: CPT

## 2021-09-23 PROCEDURE — 86140 C-REACTIVE PROTEIN: CPT

## 2021-09-23 PROCEDURE — 84520 ASSAY OF UREA NITROGEN: CPT

## 2021-09-23 PROCEDURE — 85025 COMPLETE CBC W/AUTO DIFF WBC: CPT

## 2021-09-23 PROCEDURE — 84450 TRANSFERASE (AST) (SGOT): CPT

## 2021-09-23 PROCEDURE — 85652 RBC SED RATE AUTOMATED: CPT

## 2021-09-24 ENCOUNTER — HOME INFUSION (PRE-WILLOW HOME INFUSION) (OUTPATIENT)
Dept: PHARMACY | Facility: CLINIC | Age: 71
End: 2021-09-24

## 2021-09-28 ENCOUNTER — APPOINTMENT (OUTPATIENT)
Dept: GENERAL RADIOLOGY | Facility: CLINIC | Age: 71
End: 2021-09-28
Attending: EMERGENCY MEDICINE
Payer: MEDICARE

## 2021-09-28 ENCOUNTER — HOSPITAL ENCOUNTER (EMERGENCY)
Facility: CLINIC | Age: 71
Discharge: HOME | End: 2021-09-28
Attending: EMERGENCY MEDICINE | Admitting: EMERGENCY MEDICINE
Payer: MEDICARE

## 2021-09-28 ENCOUNTER — HOME INFUSION (PRE-WILLOW HOME INFUSION) (OUTPATIENT)
Dept: PHARMACY | Facility: CLINIC | Age: 71
End: 2021-09-28

## 2021-09-28 VITALS
HEART RATE: 79 BPM | BODY MASS INDEX: 32.08 KG/M2 | WEIGHT: 250 LBS | TEMPERATURE: 97.2 F | DIASTOLIC BLOOD PRESSURE: 80 MMHG | OXYGEN SATURATION: 97 % | HEIGHT: 74 IN | RESPIRATION RATE: 18 BRPM | SYSTOLIC BLOOD PRESSURE: 153 MMHG

## 2021-09-28 DIAGNOSIS — Z45.2 PERIPHERALLY INSERTED CENTRAL CATHETER IN PLACE: ICD-10-CM

## 2021-09-28 PROCEDURE — 99283 EMERGENCY DEPT VISIT LOW MDM: CPT | Mod: 25 | Performed by: EMERGENCY MEDICINE

## 2021-09-28 PROCEDURE — 99282 EMERGENCY DEPT VISIT SF MDM: CPT | Performed by: EMERGENCY MEDICINE

## 2021-09-28 PROCEDURE — 999N000065 XR CHEST PORT 1 VIEW

## 2021-09-28 PROCEDURE — 272N000579 HC TRAY POWER PICC SOLO 4FR SINGLE LUMEN

## 2021-09-28 PROCEDURE — 36569 INSJ PICC 5 YR+ W/O IMAGING: CPT

## 2021-09-28 PROCEDURE — 71045 X-RAY EXAM CHEST 1 VIEW: CPT

## 2021-09-28 RX ORDER — LIDOCAINE 40 MG/G
CREAM TOPICAL
Status: DISCONTINUED | OUTPATIENT
Start: 2021-09-28 | End: 2021-09-29 | Stop reason: HOSPADM

## 2021-09-28 ASSESSMENT — MIFFLIN-ST. JEOR: SCORE: 1963.74

## 2021-09-28 NOTE — ED PROVIDER NOTES
"  History     Chief Complaint   Patient presents with     Otalgia     pt reports when PICC line is flushed pt states \" I can feel it in my ear\"   pt has PICC line for infection in left foot.  pt would like PICC line checked     HPI  Reilly Borja is a 71 year old male with past medical history significant for Charcot's joint iron deficiency anemia gout currently being treated for a left foot infection with IV antibiotics who presents emergency department complaining of right ear sensation when receiving fluids and antibiotics through his PICC line.  States he has had this in for about 10 days and now over the past few days he has had this sensation like he is feeling the fluid going into his ear.  He denies any fevers or chills has not had any headache or visual changes.  He denies any neck pain chest pain shortness of breath abdominal pain back pain focal numbness weakness in extremity or bowel or bladder dysfunction.    Allergies:  Allergies   Allergen Reactions     Simvastatin Muscle Pain (Myalgia)       Problem List:    Patient Active Problem List    Diagnosis Date Noted     Charcot's joint of left foot 09/17/2021     Priority: Medium     Status post debridement 09/17/2021     Priority: Medium     Iron deficiency anemia, unspecified 09/16/2021     Priority: Medium     Vitreous degeneration of right eye 09/16/2021     Priority: Medium     Hx of gout 09/16/2021     Priority: Medium     Pain in left ankle 09/16/2021     Priority: Medium     Personal history of colonic polyps 09/16/2021     Priority: Medium     Astigmatism 09/16/2021     Priority: Medium     Blepharochalasis 09/16/2021     Priority: Medium     Cataract 09/16/2021     Priority: Medium     Nonalcoholic fatty liver disease 09/16/2021     Priority: Medium     Overweight 09/16/2021     Priority: Medium     Presbyopia 09/16/2021     Priority: Medium     Pure hypercholesterolemia 09/16/2021     Priority: Medium     Chronic heart failure, unspecified " heart failure type (H) 06/03/2021     Priority: Medium     Essential hypertension 11/16/2019     Priority: Medium     Type 2 diabetes mellitus with complication, with long-term current use of insulin (H) 11/16/2019     Priority: Medium     S/P spinal surgery 11/16/2019     Priority: Medium     Lumbar radiculopathy 02/06/2017     Priority: Medium        Past Medical History:    Past Medical History:   Diagnosis Date     Congestive heart failure (H)      Diabetes (H)      Hypertension      Iron deficiency anemia secondary to inadequate dietary iron intake        Past Surgical History:    Past Surgical History:   Procedure Laterality Date     ARTHROSCOPY KNEE Left      C LUMBAR SPINE FUSN,POST INTRBDY Right 02/06/2017    Procedure: RIGHT L5-S1 TRANSFORAMINAL LUMBAR INTERBODY FUSION;  Surgeon: Rajiv Zavala MD;  Location: [facility] Main OR;  Service: Spine     IRRIGATION AND DEBRIDEMENT LOWER EXTREMITY, COMBINED Left 9/17/2021    Procedure: IRRIGATION AND DEBRIDEMENT  WITH BONE BIOPSY LEFT FOOT;  Surgeon: Julius Campbell DO;  Location: [facility] Main OR     OTHER SURGICAL HISTORY Left     surgery for charcot of left foot     REMOVE HARDWARE LOWER EXTREMITY Left 9/17/2021    Procedure: REMOVAL OF HARDWARE LEFT FOOT DEEP;  Surgeon: Julius Campbell DO;  Location: [facility] Main OR       Family History:    Family History   Problem Relation Age of Onset     Diabetes Mother        Social History:  Marital Status:  Single [1]  Social History     Tobacco Use     Smoking status: Never Smoker     Smokeless tobacco: Never Used   Vaping Use     Vaping Use: Never used   Substance Use Topics     Alcohol use: Not Currently     Drug use: Never        Medications:    acetaminophen (TYLENOL) 500 MG tablet  aspirin (ASA) 81 MG EC tablet  atorvastatin (LIPITOR) 80 MG tablet  ceFAZolin (ANCEF) intermittent infusion 2 g in 100 mL dextrose PRE-MIX  furosemide (LASIX) 20 MG tablet  glucose-vitamin C 4-6 GM-MG  "CHEW  insulin glargine (LANTUS VIAL) 100 UNIT/ML vial  losartan (COZAAR) 50 MG tablet  metFORMIN (GLUCOPHAGE) 1000 MG tablet  multivitamin (THERMEMS) TABS  oxyCODONE (ROXICODONE) 5 MG tablet  Semaglutide,0.25 or 0.5MG/DOS, 2 MG/1.5ML SOPN  senna-docusate (SENOKOT-S/PERICOLACE) 8.6-50 MG tablet          Review of Systems  As per HPI.  Physical Exam   BP: (!) 153/80  Pulse: 79  Temp: 97.2  F (36.2  C)  Resp: 18  Height: 188 cm (6' 2\")  Weight: 113.4 kg (250 lb)  SpO2: 97 %      Physical Exam  Vitals and nursing note reviewed.   Constitutional:       General: He is not in acute distress.     Appearance: Normal appearance. He is not ill-appearing, toxic-appearing or diaphoretic.   HENT:      Right Ear: External ear normal.      Ears:      Comments: Right TM without erythema or bulging.  Possible slight amount of fluid behind TM     Nose: Nose normal.   Eyes:      Conjunctiva/sclera: Conjunctivae normal.   Cardiovascular:      Rate and Rhythm: Normal rate and regular rhythm.      Pulses: Normal pulses.      Heart sounds: Normal heart sounds. No murmur heard.     Pulmonary:      Effort: Pulmonary effort is normal.      Breath sounds: Normal breath sounds. No wheezing, rhonchi or rales.   Chest:      Chest wall: No tenderness.   Musculoskeletal:      Cervical back: Normal range of motion and neck supple. No tenderness.      Comments: Right antecubital region with positive PICC line in place no drainage no erythema edema noted.  Pulses sensation symmetrical.   Skin:     General: Skin is warm.   Neurological:      General: No focal deficit present.      Motor: No weakness.      Coordination: Coordination normal.   Psychiatric:         Mood and Affect: Mood normal.         ED Course        Procedures              Critical Care time:  none               Results for orders placed or performed during the hospital encounter of 09/28/21 (from the past 24 hour(s))   XR Chest 1 View    Narrative    EXAM: XR CHEST 1 VIEW  LOCATION: [facility] " [facility]  DATE/TIME: 9/28/2021 6:04 PM    INDICATION: PICC line placement.  COMPARISON: Chest radiograph performed 09/18/2021.      Impression    IMPRESSION: Right PICC line is noted to ascend into the right neck, tip not seen. This should be repositioned. Small right pleural effusion, with mild associated infiltrate or atelectasis at the right lung base. Heart size appears stable. No   pneumothorax.   XR Chest Port 1 View    Narrative    EXAM: XR CHEST PORT 1 VIEW  LOCATION: [facility]  DATE/TIME: 9/28/2021 8:39 PM    INDICATION: s/p line adjustment  COMPARISON: 9/28/2021 and 1810 hours      Impression    IMPRESSION: There is no change in right PICC line which extends up into the right internal jugular vein, the tip of the line is not visible.    Heart size upper limits of normal. Lungs clear. Multiple old right-sided rib fractures.   XR Chest Port 1 View    Narrative    EXAM: XR CHEST PORT 1 VIEW  LOCATION: [facility]  DATE/TIME: 9/28/2021 9:05 PM    INDICATION: picc line manipulation  COMPARISON: Earlier today.      Impression    IMPRESSION: Right PICC is malpositioned extending into the right neck presumably into the internal jugular vein. Repositioning recommended. Stable cardiomegaly. Mild interstitial prominence suggesting mild edema. No significant pleural fluid. No   pneumothorax. Old healed rib fractures on the right.   XR Chest Port 1 View    Narrative    EXAM: XR CHEST PORT 1 VIEW  LOCATION: [facility]  DATE/TIME: 9/28/2021 9:52 PM    INDICATION: PICC line placement  COMPARISON: 09/28/2021      Impression    IMPRESSION: Right-sided PICC line terminates over the distal SVC. Heart size is normal. Mild interstitial prominence is again noted bilaterally, slightly worse in the right lower lobe. Differential considerations include mild edema, and chronic   interstitial scarring. There is a new small  right pleural effusion. No pneumothorax. Old right rib fractures.   Single Lumen PICC Placement    Narrative    Kinza Rodriguez RN     9/28/2021 11:00 PM  [facility]    Single Lumen PICC Placement    Date/Time: 9/28/2021 10:47 PM  Performed by: Kinza Rodriguez RN  Authorized by: Rome Vidales MD   Indications: vascular access    UNIVERSAL PROTOCOL   Site Marked: NA  Prior Images Obtained and Reviewed:  Yes  Required items: Required blood products, implants, devices and special   equipment available    Patient identity confirmed:  Verbally with patient, arm band and   hospital-assigned identification number  NA - No sedation, light sedation, or local anesthesia  Confirmation Checklist:  Patient's identity using two indicators,   procedure was appropriate and matched the consent or emergent situation,   relevant allergies and correct equipment/implants were available  Time out: Immediately prior to the procedure a time out was called    Universal Protocol: the Joint Commission Universal Protocol was followed    Preparation: Patient was prepped and draped in usual sterile fashion    ESBL (mL):  0        SEDATION    Patient Sedated: No        Preparation: skin prepped with 2% chlorhexidine  Skin prep agent: skin prep agent completely dried prior to procedure  Sterile barriers: maximum sterile barriers were used: cap, mask, sterile   gown, sterile gloves, and large sterile sheet  Hand hygiene: hand hygiene performed prior to central venous catheter   insertion  Type of line used: PICC  Catheter type: single lumen  Lumen type: valved  Catheter size: 4 Fr  Brand: Bard  Lot number: [id number]  : Over the wire exchange.  Number of attempts: 1  Successful placement: yes  Orientation: right  Site rationale: Over the wire exchange of existing PICC that was   malpositioned and unable to reposition  Arm circumference: adults 10 cm  Extremity circumference: 36  Visible catheter length:  0  Internal length: 50 cm  Total catheter length: 50  Dressing and securement: antibiotic disc placed, occlusive dressing   applied and statlock  Post procedure assessment: blood return through all ports, free fluid flow   and placement verified by x-ray  PROCEDURE   Patient Tolerance:  Patient tolerated the procedure well with no immediate   complications  Describe Procedure: Called for a patient with a malpositioned Right arm   PICC. Xray had been taken in ER to confirm malposition. Multiple attempts   to reposition PICC by power flushing and postioning patient were   unsuccessful. Order per MD for over the wire exchange and placement of a   new PICC.   Patient verbalized understanding, consent signed. New PICC placed by over   the wire technique without difficulty. Brisk blood return, flushes easily.   Malpositioned PICC was removed without difficulty. Length at tip was 51cm.   Chart indicated that was PICC length at time of insertion. New PICC   placed- cut at 50cm with internal length of 50cm. 0cm out.  Xray confirmed position. Right sided PICC line terminates over distal SVC-   Rashel North-MD         Medications - No data to display    Assessments & Plan (with Medical Decision Making) records were reviewed.  1 view chest x-ray was obtained and revealed a PICC line going into the right neck region.  Due to this finding I did contact the PICC line nurse and she came and evaluated patient.  She tried several times to flush the line back into position with 2 portable chest x-rays showing no change in position.  She therefore changed the PICC line over a wire and repeat imaging study at this time revealed that the line was in appropriate position.  Findings discussed with patient and he is comfortable being discharged at this time he will go ahead and use the PICC line and return for any further concerns erythema drainage chest pain shortness of breath ear pain neck pain or other symptoms present.  Patient is  agreement this plan.     I have reviewed the nursing notes.    I have reviewed the findings, diagnosis, plan and need for follow up with the patient.       New Prescriptions    No medications on file       Final diagnoses:   Peripherally inserted central catheter in place - replaced       9/28/2021   [facility] EMERGENCY DEPT     Rome Vidales MD  09/29/21 [id number]

## 2021-09-29 ENCOUNTER — HOME INFUSION (PRE-WILLOW HOME INFUSION) (OUTPATIENT)
Dept: PHARMACY | Facility: CLINIC | Age: 71
End: 2021-09-29

## 2021-09-29 ENCOUNTER — LAB REQUISITION (OUTPATIENT)
Dept: LAB | Facility: CLINIC | Age: 71
End: 2021-09-29
Payer: MEDICARE

## 2021-09-29 DIAGNOSIS — M14.672 CHARCOT'S JOINT, LEFT ANKLE AND FOOT: ICD-10-CM

## 2021-09-29 LAB
AST SERPL W P-5'-P-CCNC: 18 U/L (ref 0–45)
BASOPHILS # BLD AUTO: 0 10E3/UL (ref 0–0.2)
BASOPHILS NFR BLD AUTO: 1 %
BUN SERPL-MCNC: 14 MG/DL (ref 7–30)
CREAT SERPL-MCNC: 0.96 MG/DL (ref 0.66–1.25)
CRP SERPL-MCNC: 38 MG/L (ref 0–8)
EOSINOPHIL # BLD AUTO: 0.2 10E3/UL (ref 0–0.7)
EOSINOPHIL NFR BLD AUTO: 4 %
ERYTHROCYTE [DISTWIDTH] IN BLOOD BY AUTOMATED COUNT: 17.2 % (ref 10–15)
GFR SERPL CREATININE-BSD FRML MDRD: 80 ML/MIN/1.73M2
HCT VFR BLD AUTO: 34.7 % (ref 40–53)
HGB BLD-MCNC: 10.6 G/DL (ref 13.3–17.7)
IMM GRANULOCYTES # BLD: 0 10E3/UL
IMM GRANULOCYTES NFR BLD: 1 %
LYMPHOCYTES # BLD AUTO: 1.4 10E3/UL (ref 0.8–5.3)
LYMPHOCYTES NFR BLD AUTO: 25 %
MCH RBC QN AUTO: 25.9 PG (ref 26.5–33)
MCHC RBC AUTO-ENTMCNC: 30.5 G/DL (ref 31.5–36.5)
MCV RBC AUTO: 85 FL (ref 78–100)
MONOCYTES # BLD AUTO: 0.5 10E3/UL (ref 0–1.3)
MONOCYTES NFR BLD AUTO: 9 %
NEUTROPHILS # BLD AUTO: 3.4 10E3/UL (ref 1.6–8.3)
NEUTROPHILS NFR BLD AUTO: 60 %
NRBC # BLD AUTO: 0 10E3/UL
NRBC BLD AUTO-RTO: 0 /100
PLATELET # BLD AUTO: 323 10E3/UL (ref 150–450)
RBC # BLD AUTO: 4.1 10E6/UL (ref 4.4–5.9)
WBC # BLD AUTO: 5.5 10E3/UL (ref 4–11)

## 2021-09-29 PROCEDURE — 82565 ASSAY OF CREATININE: CPT

## 2021-09-29 PROCEDURE — 86140 C-REACTIVE PROTEIN: CPT

## 2021-09-29 PROCEDURE — 84520 ASSAY OF UREA NITROGEN: CPT

## 2021-09-29 PROCEDURE — 84450 TRANSFERASE (AST) (SGOT): CPT

## 2021-09-29 PROCEDURE — 36592 COLLECT BLOOD FROM PICC: CPT

## 2021-09-29 PROCEDURE — 85025 COMPLETE CBC W/AUTO DIFF WBC: CPT

## 2021-09-29 NOTE — PROGRESS NOTES
This is a recent snapshot of the patient's [facility] medical record.  For current drug dose and complete information and questions, call [phone] or In Basket pool, fv home infusion ([id number])  CSN Number:  [id number]

## 2021-09-29 NOTE — PROCEDURES
[facility]    Single Lumen PICC Placement    Date/Time: 9/28/2021 10:37 PM  Performed by: Kinza Rodriguez RN  Authorized by: Rome Vidales MD   Indications: vascular access    UNIVERSAL PROTOCOL   Site Marked: Yes  Prior Images Obtained and Reviewed:  Yes  Required items: Required blood products, implants, devices and special equipment available    Patient identity confirmed:  Verbally with patient, arm band and hospital-assigned identification number  NA - No sedation, light sedation, or local anesthesia  Confirmation Checklist:  Patient's identity using two indicators, relevant allergies, procedure was appropriate and matched the consent or emergent situation and correct equipment/implants were available  Time out: Immediately prior to the procedure a time out was called    Universal Protocol: the Joint Commission Universal Protocol was followed    Preparation: Patient was prepped and draped in usual sterile fashion    ESBL (mL):  0         ANESTHESIA    Anesthesia: Local infiltration      SEDATION    Patient Sedated: No        Skin prep agent: skin prep agent completely dried prior to procedure  Sterile barriers: maximum sterile barriers were used: cap, mask, sterile gown, sterile gloves, and large sterile sheet  Hand hygiene: hand hygiene performed prior to central venous catheter insertion  Type of line used: PICC  Catheter type: single lumen  Lumen type: valved  Catheter size: 4 Fr  Brand: Bard  Lot number: [id number]  : Over the wire exchange PICC line.  Number of attempts: 1  Successful placement: yes  Arm circumference: adults 10 cm  Extremity circumference: 36  Visible catheter length: 0  Internal length: 50 cm  Total catheter length: 50  Dressing and securement: antibiotic disc placed, occlusive dressing applied and statlock  Post procedure assessment: blood return through all ports, free fluid flow and placement verified by x-ray  PROCEDURE   Patient Tolerance:   Patient tolerated the procedure well with no immediate complications and patient tolerated the procedure well with no immediate complications

## 2021-09-29 NOTE — PROCEDURES
[facility]    Single Lumen PICC Placement    Date/Time: 9/28/2021 10:47 PM  Performed by: Kinza Rodriguez RN  Authorized by: Rome Vidales MD   Indications: vascular access    UNIVERSAL PROTOCOL   Site Marked: NA  Prior Images Obtained and Reviewed:  Yes  Required items: Required blood products, implants, devices and special equipment available    Patient identity confirmed:  Verbally with patient, arm band and hospital-assigned identification number  NA - No sedation, light sedation, or local anesthesia  Confirmation Checklist:  Patient's identity using two indicators, procedure was appropriate and matched the consent or emergent situation, relevant allergies and correct equipment/implants were available  Time out: Immediately prior to the procedure a time out was called    Universal Protocol: the Joint Commission Universal Protocol was followed    Preparation: Patient was prepped and draped in usual sterile fashion    ESBL (mL):  0        SEDATION    Patient Sedated: No        Preparation: skin prepped with 2% chlorhexidine  Skin prep agent: skin prep agent completely dried prior to procedure  Sterile barriers: maximum sterile barriers were used: cap, mask, sterile gown, sterile gloves, and large sterile sheet  Hand hygiene: hand hygiene performed prior to central venous catheter insertion  Type of line used: PICC  Catheter type: single lumen  Lumen type: valved  Catheter size: 4 Fr  Brand: [employer]  Lot number: [id number]  : Over the wire exchange.  Number of attempts: 1  Successful placement: yes  Orientation: right  Site rationale: Over the wire exchange of existing PICC that was malpositioned and unable to reposition  Arm circumference: adults 10 cm  Extremity circumference: 36  Visible catheter length: 0  Internal length: 50 cm  Total catheter length: 50  Dressing and securement: antibiotic disc placed, occlusive dressing applied and statlock  Post procedure assessment: blood  return through all ports, free fluid flow and placement verified by x-ray  PROCEDURE   Patient Tolerance:  Patient tolerated the procedure well with no immediate complications  Describe Procedure: Called for a patient with a malpositioned Right arm PICC. Xray had been taken in ER to confirm malposition. Multiple attempts to reposition PICC by power flushing and postioning patient were unsuccessful. Order per MD for over the wire exchange and placement of a new PICC.   Patient verbalized understanding, consent signed. New PICC placed by over the wire technique without difficulty. Brisk blood return, flushes easily. Malpositioned PICC was removed without difficulty. Length at tip was 51cm. Chart indicated that was PICC length at time of insertion. New PICC placed- cut at 50cm with internal length of 50cm. 0cm out.  Xray confirmed position. Right sided PICC line terminates over distal SVC- Rashel North-MD

## 2021-09-29 NOTE — DISCHARGE INSTRUCTIONS
Return if symptoms worsen or new symptoms develop.  Follow-up with primary care physician next available.  Drink plenty of fluids.  If increased pain shortness of breath or other symptoms present please return for recheck.

## 2021-09-30 ENCOUNTER — HOME INFUSION (PRE-WILLOW HOME INFUSION) (OUTPATIENT)
Dept: PHARMACY | Facility: CLINIC | Age: 71
End: 2021-09-30

## 2021-10-04 NOTE — PROGRESS NOTES
This is a recent snapshot of the patient's [facility] medical record.  For current drug dose and complete information and questions, call [phone] or In Basket pool, fv home infusion ([id number])  CSN Number:  [id number]

## 2021-10-06 ENCOUNTER — HOME INFUSION (PRE-WILLOW HOME INFUSION) (OUTPATIENT)
Dept: PHARMACY | Facility: CLINIC | Age: 71
End: 2021-10-06

## 2021-10-06 ENCOUNTER — LAB REQUISITION (OUTPATIENT)
Dept: LAB | Facility: CLINIC | Age: 71
End: 2021-10-06
Payer: MEDICARE

## 2021-10-06 LAB
AST SERPL W P-5'-P-CCNC: 20 U/L (ref 0–45)
BASOPHILS # BLD AUTO: 0 10E3/UL (ref 0–0.2)
BASOPHILS NFR BLD AUTO: 0 %
BUN SERPL-MCNC: 17 MG/DL (ref 7–30)
CREAT SERPL-MCNC: 0.74 MG/DL (ref 0.66–1.25)
CRP SERPL-MCNC: 21 MG/L (ref 0–8)
EOSINOPHIL # BLD AUTO: 0.2 10E3/UL (ref 0–0.7)
EOSINOPHIL NFR BLD AUTO: 3 %
ERYTHROCYTE [DISTWIDTH] IN BLOOD BY AUTOMATED COUNT: 16.3 % (ref 10–15)
ERYTHROCYTE [SEDIMENTATION RATE] IN BLOOD BY WESTERGREN METHOD: 47 MM/HR (ref 0–20)
GFR SERPL CREATININE-BSD FRML MDRD: >90 ML/MIN/1.73M2
HCT VFR BLD AUTO: 34.9 % (ref 40–53)
HGB BLD-MCNC: 10.8 G/DL (ref 13.3–17.7)
IMM GRANULOCYTES # BLD: 0 10E3/UL
IMM GRANULOCYTES NFR BLD: 0 %
LYMPHOCYTES # BLD AUTO: 1.2 10E3/UL (ref 0.8–5.3)
LYMPHOCYTES NFR BLD AUTO: 21 %
MCH RBC QN AUTO: 26.3 PG (ref 26.5–33)
MCHC RBC AUTO-ENTMCNC: 30.9 G/DL (ref 31.5–36.5)
MCV RBC AUTO: 85 FL (ref 78–100)
MONOCYTES # BLD AUTO: 0.4 10E3/UL (ref 0–1.3)
MONOCYTES NFR BLD AUTO: 7 %
NEUTROPHILS # BLD AUTO: 3.9 10E3/UL (ref 1.6–8.3)
NEUTROPHILS NFR BLD AUTO: 69 %
NRBC # BLD AUTO: 0 10E3/UL
NRBC BLD AUTO-RTO: 0 /100
PLATELET # BLD AUTO: 266 10E3/UL (ref 150–450)
RBC # BLD AUTO: 4.1 10E6/UL (ref 4.4–5.9)
WBC # BLD AUTO: 5.7 10E3/UL (ref 4–11)

## 2021-10-06 PROCEDURE — 84520 ASSAY OF UREA NITROGEN: CPT

## 2021-10-06 PROCEDURE — 84450 TRANSFERASE (AST) (SGOT): CPT

## 2021-10-06 PROCEDURE — 36415 COLL VENOUS BLD VENIPUNCTURE: CPT

## 2021-10-06 PROCEDURE — 82565 ASSAY OF CREATININE: CPT

## 2021-10-06 PROCEDURE — 85652 RBC SED RATE AUTOMATED: CPT

## 2021-10-06 PROCEDURE — 86140 C-REACTIVE PROTEIN: CPT

## 2021-10-06 PROCEDURE — 85025 COMPLETE CBC W/AUTO DIFF WBC: CPT

## 2021-10-06 NOTE — PROGRESS NOTES
This is a recent snapshot of the patient's [facility] medical record.  For current drug dose and complete information and questions, call [phone] or In Basket pool, fv home infusion ([id number])  CSN Number:  [id number]

## 2021-10-07 ENCOUNTER — HOME INFUSION (PRE-WILLOW HOME INFUSION) (OUTPATIENT)
Dept: PHARMACY | Facility: CLINIC | Age: 71
End: 2021-10-07

## 2021-10-13 ENCOUNTER — HOME INFUSION (PRE-WILLOW HOME INFUSION) (OUTPATIENT)
Dept: PHARMACY | Facility: CLINIC | Age: 71
End: 2021-10-13
Payer: MEDICARE

## 2021-10-13 ENCOUNTER — LAB REQUISITION (OUTPATIENT)
Dept: LAB | Facility: CLINIC | Age: 71
End: 2021-10-13
Payer: MEDICARE

## 2021-10-13 LAB
AST SERPL W P-5'-P-CCNC: 22 U/L (ref 0–45)
BASOPHILS # BLD AUTO: 0 10E3/UL (ref 0–0.2)
BASOPHILS NFR BLD AUTO: 0 %
BUN SERPL-MCNC: 21 MG/DL (ref 7–30)
CREAT SERPL-MCNC: 0.8 MG/DL (ref 0.66–1.25)
CRP SERPL-MCNC: 7.3 MG/L (ref 0–8)
EOSINOPHIL # BLD AUTO: 0.2 10E3/UL (ref 0–0.7)
EOSINOPHIL NFR BLD AUTO: 3 %
ERYTHROCYTE [DISTWIDTH] IN BLOOD BY AUTOMATED COUNT: 16 % (ref 10–15)
ERYTHROCYTE [SEDIMENTATION RATE] IN BLOOD BY WESTERGREN METHOD: 43 MM/HR (ref 0–20)
GFR SERPL CREATININE-BSD FRML MDRD: >90 ML/MIN/1.73M2
HCT VFR BLD AUTO: 36 % (ref 40–53)
HGB BLD-MCNC: 11.3 G/DL (ref 13.3–17.7)
IMM GRANULOCYTES # BLD: 0 10E3/UL
IMM GRANULOCYTES NFR BLD: 0 %
LYMPHOCYTES # BLD AUTO: 1.7 10E3/UL (ref 0.8–5.3)
LYMPHOCYTES NFR BLD AUTO: 28 %
MCH RBC QN AUTO: 26.8 PG (ref 26.5–33)
MCHC RBC AUTO-ENTMCNC: 31.4 G/DL (ref 31.5–36.5)
MCV RBC AUTO: 85 FL (ref 78–100)
MONOCYTES # BLD AUTO: 0.5 10E3/UL (ref 0–1.3)
MONOCYTES NFR BLD AUTO: 8 %
NEUTROPHILS # BLD AUTO: 3.8 10E3/UL (ref 1.6–8.3)
NEUTROPHILS NFR BLD AUTO: 61 %
NRBC # BLD AUTO: 0 10E3/UL
NRBC BLD AUTO-RTO: 0 /100
PLATELET # BLD AUTO: 282 10E3/UL (ref 150–450)
POTASSIUM BLD-SCNC: 3.6 MMOL/L (ref 3.4–5.3)
RBC # BLD AUTO: 4.22 10E6/UL (ref 4.4–5.9)
WBC # BLD AUTO: 6.2 10E3/UL (ref 4–11)

## 2021-10-13 PROCEDURE — 84450 TRANSFERASE (AST) (SGOT): CPT

## 2021-10-13 PROCEDURE — 86140 C-REACTIVE PROTEIN: CPT

## 2021-10-13 PROCEDURE — 36592 COLLECT BLOOD FROM PICC: CPT

## 2021-10-13 PROCEDURE — 84520 ASSAY OF UREA NITROGEN: CPT

## 2021-10-13 PROCEDURE — 82565 ASSAY OF CREATININE: CPT

## 2021-10-13 PROCEDURE — 85652 RBC SED RATE AUTOMATED: CPT

## 2021-10-13 PROCEDURE — 85025 COMPLETE CBC W/AUTO DIFF WBC: CPT

## 2021-10-13 PROCEDURE — 84132 ASSAY OF SERUM POTASSIUM: CPT

## 2021-10-14 ENCOUNTER — HOME INFUSION (PRE-WILLOW HOME INFUSION) (OUTPATIENT)
Dept: PHARMACY | Facility: CLINIC | Age: 71
End: 2021-10-14

## 2021-10-14 NOTE — DISCHARGE SUMMARY
[facility] Discharge Summary    Reilly Borja MRN# [id number]   Age: 71 year old YOB: 1950     Date of Admission:  9/17/2021  Date of Discharge::  9/20/21  Admitting Physician:  Julius Campbell DO  Discharge Physician:  Julius Campbell DO     Home clinic: unknown          Admission Diagnoses:   Charcot neuropathy, left          Discharge Diagnosis:   Infected charcot, left foot          Procedures:   Procedure(s): Irrigation and debridement left foot with deep hardware removal       PICC placeemnetn           Medications Prior to Admission:   As per MAR          Discharge Medications:   As per MAR          Consultations:   Medicine, ID          Brief History of Illness:   Patient presneted for revision ORIF however was found to have unexpected gross pus upon incision prompting transition to I&D           Hospital Course:   The patient's hospital course was unremarkable.  He recovered as anticipated and experienced no post-operative complications. Was admitted for cultures and IV abx.  Routine hospitilzation with dischage with iV abx and PICC          Discharge Instructions and Follow-Up:   Discharge diet: Regular   Discharge activity: Activity as tolerated  NWB LLE   Discharge follow-up: 10-14 days with ortho   Wound care: Apply bandage daily  Keep wound clean and dry  Maintain dressing           Discharge Disposition:   Discharged to home          Julius Campbell DO

## 2021-10-15 ENCOUNTER — HOME INFUSION (PRE-WILLOW HOME INFUSION) (OUTPATIENT)
Dept: PHARMACY | Facility: CLINIC | Age: 71
End: 2021-10-15

## 2021-10-18 NOTE — PROGRESS NOTES
This is a recent snapshot of the patient's [facility] medical record.  For current drug dose and complete information and questions, call [phone] or In Basket pool, fv home infusion ([id number])  CSN Number:  [id number]

## 2021-10-19 NOTE — PROGRESS NOTES
This is a recent snapshot of the patient's [facility] medical record.  For current drug dose and complete information and questions, call [phone] or In Basket pool, fv home infusion ([id number])  CSN Number:  [id number]

## 2021-10-20 ENCOUNTER — LAB REQUISITION (OUTPATIENT)
Dept: LAB | Facility: CLINIC | Age: 71
End: 2021-10-20
Payer: MEDICARE

## 2021-10-20 ENCOUNTER — HOME INFUSION (PRE-WILLOW HOME INFUSION) (OUTPATIENT)
Dept: PHARMACY | Facility: CLINIC | Age: 71
End: 2021-10-20

## 2021-10-20 DIAGNOSIS — M14.672 CHARCOT'S JOINT, LEFT ANKLE AND FOOT: ICD-10-CM

## 2021-10-20 LAB
AST SERPL W P-5'-P-CCNC: 28 U/L (ref 0–45)
BASOPHILS # BLD AUTO: 0 10E3/UL (ref 0–0.2)
BASOPHILS NFR BLD AUTO: 0 %
BUN SERPL-MCNC: 17 MG/DL (ref 7–30)
CREAT SERPL-MCNC: 0.69 MG/DL (ref 0.66–1.25)
CRP SERPL-MCNC: 5.9 MG/L (ref 0–8)
EOSINOPHIL # BLD AUTO: 0.4 10E3/UL (ref 0–0.7)
EOSINOPHIL NFR BLD AUTO: 8 %
ERYTHROCYTE [DISTWIDTH] IN BLOOD BY AUTOMATED COUNT: 16.4 % (ref 10–15)
ERYTHROCYTE [SEDIMENTATION RATE] IN BLOOD BY WESTERGREN METHOD: 37 MM/HR (ref 0–20)
GFR SERPL CREATININE-BSD FRML MDRD: >90 ML/MIN/1.73M2
HCT VFR BLD AUTO: 37.3 % (ref 40–53)
HGB BLD-MCNC: 11.7 G/DL (ref 13.3–17.7)
IMM GRANULOCYTES # BLD: 0 10E3/UL
IMM GRANULOCYTES NFR BLD: 0 %
LYMPHOCYTES # BLD AUTO: 1.3 10E3/UL (ref 0.8–5.3)
LYMPHOCYTES NFR BLD AUTO: 24 %
MCH RBC QN AUTO: 26.6 PG (ref 26.5–33)
MCHC RBC AUTO-ENTMCNC: 31.4 G/DL (ref 31.5–36.5)
MCV RBC AUTO: 85 FL (ref 78–100)
MONOCYTES # BLD AUTO: 0.5 10E3/UL (ref 0–1.3)
MONOCYTES NFR BLD AUTO: 8 %
NEUTROPHILS # BLD AUTO: 3.1 10E3/UL (ref 1.6–8.3)
NEUTROPHILS NFR BLD AUTO: 60 %
NRBC # BLD AUTO: 0 10E3/UL
NRBC BLD AUTO-RTO: 0 /100
PLATELET # BLD AUTO: 284 10E3/UL (ref 150–450)
RBC # BLD AUTO: 4.4 10E6/UL (ref 4.4–5.9)
WBC # BLD AUTO: 5.3 10E3/UL (ref 4–11)

## 2021-10-20 PROCEDURE — 84450 TRANSFERASE (AST) (SGOT): CPT

## 2021-10-20 PROCEDURE — 36592 COLLECT BLOOD FROM PICC: CPT

## 2021-10-20 PROCEDURE — 85652 RBC SED RATE AUTOMATED: CPT

## 2021-10-20 PROCEDURE — 86140 C-REACTIVE PROTEIN: CPT

## 2021-10-20 PROCEDURE — 82565 ASSAY OF CREATININE: CPT

## 2021-10-20 PROCEDURE — 85025 COMPLETE CBC W/AUTO DIFF WBC: CPT

## 2021-10-20 PROCEDURE — 84520 ASSAY OF UREA NITROGEN: CPT

## 2021-10-21 ENCOUNTER — HOME INFUSION (PRE-WILLOW HOME INFUSION) (OUTPATIENT)
Dept: PHARMACY | Facility: CLINIC | Age: 71
End: 2021-10-21

## 2021-10-21 NOTE — PROGRESS NOTES
This is a recent snapshot of the patient's [facility] medical record.  For current drug dose and complete information and questions, call [phone] or In Basket pool, fv home infusion ([id number])  CSN Number:  [id number]

## 2021-10-22 NOTE — PROGRESS NOTES
This is a recent snapshot of the patient's [facility] medical record.  For current drug dose and complete information and questions, call [phone] or In [facility] pool, fv home infusion ([id number])  CSN Number:  [id number]

## 2021-10-27 ENCOUNTER — LAB REQUISITION (OUTPATIENT)
Dept: LAB | Facility: CLINIC | Age: 71
End: 2021-10-27
Payer: MEDICARE

## 2021-10-27 ENCOUNTER — HOME INFUSION (PRE-WILLOW HOME INFUSION) (OUTPATIENT)
Dept: PHARMACY | Facility: CLINIC | Age: 71
End: 2021-10-27

## 2021-10-27 DIAGNOSIS — M14.672 CHARCOT'S JOINT, LEFT ANKLE AND FOOT: ICD-10-CM

## 2021-10-27 LAB
AST SERPL W P-5'-P-CCNC: 22 U/L (ref 0–45)
BASOPHILS # BLD AUTO: 0 10E3/UL (ref 0–0.2)
BASOPHILS NFR BLD AUTO: 1 %
BUN SERPL-MCNC: 18 MG/DL (ref 7–30)
CREAT SERPL-MCNC: 0.83 MG/DL (ref 0.66–1.25)
CRP SERPL-MCNC: 16 MG/L (ref 0–8)
EOSINOPHIL # BLD AUTO: 0.4 10E3/UL (ref 0–0.7)
EOSINOPHIL NFR BLD AUTO: 6 %
ERYTHROCYTE [DISTWIDTH] IN BLOOD BY AUTOMATED COUNT: 16.4 % (ref 10–15)
ERYTHROCYTE [SEDIMENTATION RATE] IN BLOOD BY WESTERGREN METHOD: 36 MM/HR (ref 0–20)
GFR SERPL CREATININE-BSD FRML MDRD: 89 ML/MIN/1.73M2
HCT VFR BLD AUTO: 38.7 % (ref 40–53)
HGB BLD-MCNC: 12.1 G/DL (ref 13.3–17.7)
HOLD SPECIMEN: NORMAL
IMM GRANULOCYTES # BLD: 0 10E3/UL
IMM GRANULOCYTES NFR BLD: 0 %
LYMPHOCYTES # BLD AUTO: 1.3 10E3/UL (ref 0.8–5.3)
LYMPHOCYTES NFR BLD AUTO: 20 %
MCH RBC QN AUTO: 26.4 PG (ref 26.5–33)
MCHC RBC AUTO-ENTMCNC: 31.3 G/DL (ref 31.5–36.5)
MCV RBC AUTO: 85 FL (ref 78–100)
MONOCYTES # BLD AUTO: 0.5 10E3/UL (ref 0–1.3)
MONOCYTES NFR BLD AUTO: 8 %
NEUTROPHILS # BLD AUTO: 4.3 10E3/UL (ref 1.6–8.3)
NEUTROPHILS NFR BLD AUTO: 65 %
NRBC # BLD AUTO: 0 10E3/UL
NRBC BLD AUTO-RTO: 0 /100
PLATELET # BLD AUTO: 273 10E3/UL (ref 150–450)
RBC # BLD AUTO: 4.58 10E6/UL (ref 4.4–5.9)
WBC # BLD AUTO: 6.6 10E3/UL (ref 4–11)

## 2021-10-27 PROCEDURE — 85025 COMPLETE CBC W/AUTO DIFF WBC: CPT

## 2021-10-27 PROCEDURE — 84450 TRANSFERASE (AST) (SGOT): CPT

## 2021-10-27 PROCEDURE — 86140 C-REACTIVE PROTEIN: CPT

## 2021-10-27 PROCEDURE — 36592 COLLECT BLOOD FROM PICC: CPT

## 2021-10-27 PROCEDURE — 84520 ASSAY OF UREA NITROGEN: CPT

## 2021-10-27 PROCEDURE — 82565 ASSAY OF CREATININE: CPT

## 2021-10-27 PROCEDURE — 85652 RBC SED RATE AUTOMATED: CPT

## 2021-10-27 NOTE — PROGRESS NOTES
This is a recent snapshot of the patient's [facility] medical record.  For current drug dose and complete information and questions, call [phone] or In Basket pool, fv home infusion ([id number])  CSN Number:  [id number]

## 2021-10-27 NOTE — PROGRESS NOTES
This is a recent snapshot of the patient's [facility] medical record.  For current drug dose and complete information and questions, call [phone] or In Basket pool, fv home infusion ([id number])  CSN Number:  [id number]

## 2021-10-28 ENCOUNTER — HOME INFUSION (PRE-WILLOW HOME INFUSION) (OUTPATIENT)
Dept: PHARMACY | Facility: CLINIC | Age: 71
End: 2021-10-28

## 2021-10-28 ENCOUNTER — TELEPHONE (OUTPATIENT)
Dept: INFECTIOUS DISEASES | Facility: CLINIC | Age: 71
End: 2021-10-28
Payer: MEDICARE

## 2021-10-28 NOTE — TELEPHONE ENCOUNTER
Date: 11/4/2021 Status: Scheduled   Time: 8:40 AM Length: 20   Visit Type: RETURN [657] Copay: $0.00   Provider: Michael Marlow MD Department: [facility] INFECTIOUS DISEASE   Bill Area: Infectious Disease [facility]

## 2021-10-28 NOTE — TELEPHONE ENCOUNTER
LVMTCB x 1    Per Dr Krause pt needs f/u prior ending treatment. Informed [facility] will extend iv abx until 11/4/21. Called to schedule pt for 11/4/21 f/u with Dr Marlow.

## 2021-10-29 NOTE — PROGRESS NOTES
This is a recent snapshot of the patient's [facility] medical record.  For current drug dose and complete information and questions, call [phone] or In Basket pool, fv home infusion ([id number])  CSN Number:  [id number]

## 2021-10-30 NOTE — PROGRESS NOTES
This is a recent snapshot of the patient's [facility] medical record.  For current drug dose and complete information and questions, call [phone] or In Basket pool, fv home infusion ([id number])  CSN Number:  [id number]

## 2021-11-03 ENCOUNTER — LAB REQUISITION (OUTPATIENT)
Dept: LAB | Facility: CLINIC | Age: 71
End: 2021-11-03
Payer: MEDICARE

## 2021-11-03 DIAGNOSIS — M14.672 CHARCOT'S JOINT, LEFT ANKLE AND FOOT: ICD-10-CM

## 2021-11-03 LAB
AST SERPL W P-5'-P-CCNC: 20 U/L (ref 0–45)
BASOPHILS # BLD AUTO: 0 10E3/UL (ref 0–0.2)
BASOPHILS NFR BLD AUTO: 0 %
BUN SERPL-MCNC: 16 MG/DL (ref 7–30)
CREAT SERPL-MCNC: 0.75 MG/DL (ref 0.66–1.25)
CRP SERPL-MCNC: 6.4 MG/L (ref 0–8)
EOSINOPHIL # BLD AUTO: 0.2 10E3/UL (ref 0–0.7)
EOSINOPHIL NFR BLD AUTO: 4 %
ERYTHROCYTE [DISTWIDTH] IN BLOOD BY AUTOMATED COUNT: 16.3 % (ref 10–15)
ERYTHROCYTE [SEDIMENTATION RATE] IN BLOOD BY WESTERGREN METHOD: 26 MM/HR (ref 0–20)
GFR SERPL CREATININE-BSD FRML MDRD: >90 ML/MIN/1.73M2
HCT VFR BLD AUTO: 36.9 % (ref 40–53)
HGB BLD-MCNC: 11.6 G/DL (ref 13.3–17.7)
HOLD SPECIMEN: NORMAL
IMM GRANULOCYTES # BLD: 0 10E3/UL
IMM GRANULOCYTES NFR BLD: 1 %
LYMPHOCYTES # BLD AUTO: 1.1 10E3/UL (ref 0.8–5.3)
LYMPHOCYTES NFR BLD AUTO: 23 %
MCH RBC QN AUTO: 26.9 PG (ref 26.5–33)
MCHC RBC AUTO-ENTMCNC: 31.4 G/DL (ref 31.5–36.5)
MCV RBC AUTO: 85 FL (ref 78–100)
MONOCYTES # BLD AUTO: 0.4 10E3/UL (ref 0–1.3)
MONOCYTES NFR BLD AUTO: 8 %
NEUTROPHILS # BLD AUTO: 3.3 10E3/UL (ref 1.6–8.3)
NEUTROPHILS NFR BLD AUTO: 64 %
NRBC # BLD AUTO: 0 10E3/UL
NRBC BLD AUTO-RTO: 0 /100
PLATELET # BLD AUTO: 228 10E3/UL (ref 150–450)
RBC # BLD AUTO: 4.32 10E6/UL (ref 4.4–5.9)
WBC # BLD AUTO: 5.1 10E3/UL (ref 4–11)

## 2021-11-03 PROCEDURE — 84450 TRANSFERASE (AST) (SGOT): CPT

## 2021-11-03 PROCEDURE — 85025 COMPLETE CBC W/AUTO DIFF WBC: CPT

## 2021-11-03 PROCEDURE — 84520 ASSAY OF UREA NITROGEN: CPT

## 2021-11-03 PROCEDURE — 85652 RBC SED RATE AUTOMATED: CPT

## 2021-11-03 PROCEDURE — 86140 C-REACTIVE PROTEIN: CPT

## 2021-11-03 PROCEDURE — 82565 ASSAY OF CREATININE: CPT

## 2021-11-03 NOTE — PROGRESS NOTES
This is a recent snapshot of the patient's [facility] medical record.  For current drug dose and complete information and questions, call [phone] or In Basket pool, fv home infusion ([id number])  CSN Number:  [id number]

## 2021-11-04 ENCOUNTER — OFFICE VISIT (OUTPATIENT)
Dept: INFECTIOUS DISEASES | Facility: CLINIC | Age: 71
End: 2021-11-04
Payer: MEDICARE

## 2021-11-04 ENCOUNTER — HOME INFUSION (PRE-WILLOW HOME INFUSION) (OUTPATIENT)
Dept: PHARMACY | Facility: CLINIC | Age: 71
End: 2021-11-04

## 2021-11-04 VITALS
BODY MASS INDEX: 33.1 KG/M2 | WEIGHT: 257.8 LBS | DIASTOLIC BLOOD PRESSURE: 82 MMHG | HEART RATE: 80 BPM | SYSTOLIC BLOOD PRESSURE: 144 MMHG | TEMPERATURE: 97.6 F

## 2021-11-04 DIAGNOSIS — M14.672 CHARCOT'S JOINT OF LEFT FOOT: ICD-10-CM

## 2021-11-04 DIAGNOSIS — M86.672 OTHER CHRONIC OSTEOMYELITIS OF LEFT FOOT (H): Primary | ICD-10-CM

## 2021-11-04 PROCEDURE — 99213 OFFICE O/P EST LOW 20 MIN: CPT | Performed by: INTERNAL MEDICINE

## 2021-11-04 RX ORDER — CARVEDILOL 12.5 MG/1
12.5 TABLET ORAL 2 TIMES DAILY WITH MEALS
COMMUNITY
End: 2023-07-21

## 2021-11-04 RX ORDER — DOXYCYCLINE 100 MG/1
100 CAPSULE ORAL 2 TIMES DAILY
Qty: 56 CAPSULE | Refills: 0 | Status: SHIPPED | OUTPATIENT
Start: 2021-11-04 | End: 2021-12-02

## 2021-11-04 NOTE — PATIENT INSTRUCTIONS
We will stop the IV antibiotic today  I have sent a new prescription of doxycycline to your pharmacy.  Take 1 pill twice a day  Return to clinic in 4 weeks

## 2021-11-04 NOTE — PROGRESS NOTES
[facility] INFECTIOUS DISEASE CLINIC - [place]   FOLLOW UP NOTE    Date: 11/4/2021  Patient Name: Reilly Borja   YOB: 1950  MRN: [id number]      ASSESSMENT:  71-year-old male with a history of diabetes, hypertension, who was admitted for revision of Charcot foot repair, left.     1. Charcot foot, left.  Status post hardware placement for repair in March/April 2021.  Required revision surgery a few weeks later for loosening of hardware.  Interestingly had one of his bolts poking through the skin, requiring his physician to remove it in the clinic  2. Persistent foot pain following surgery.  [facility] [place] [facility].  Underwent hardware removal on 9/17.  Unexpected finding of purulence.  Multiple cultures collected, including bone cultures all growing MSSlugdunensis.  No antibiotics or systemic signs of infection prior to procedure.  Completed 6+ weeks of IV antibiotics with cefazolin.  Inflammatory markers normal.  Clinically doing well, but has a persistent spot of serous drainage.      PLAN:  -Discontinue cefazolin.  Okay to remove PICC line  -We will start doxycycline 100 mg p.o. twice daily x28 days  -Plain x-rays of the left foot to ensure good bone healing.  Consider MRI at a later time if drainage persists/worsens  -Return to clinic in 1 month with repeat labs  -Follow-up with Dr. Campbell in podiatry clinic ([place])      Michael Marlow MD  [facility]   Clinic phone: [phone]  Clinic fax: [phone]    ______________________________________________________________________    HISTORY OF PRESENT ILLNESS:   From inpatient ID consult on 9/18/2021:      Reilly Borja is a 71 year old man with a history of diabetes, hypertension who underwent elective revision of Charcot foot repair on 9/17.  Intraoperatively, purulent material was found, raising concern for infection.  Patient underwent hardware removal with multiple cultures  collected.  He was admitted to the hospital for work-up of infection and IV antibiotics.     The patient is a relatively active person, he still works.  [facility]ot [facility] surgery in the spring of this year.  There was some loosening after surgery and he needed revision surgery a few weeks later.  After his second surgery he saw that one of the rods was poking out of his foot.  He went back to his clinic where the yasir was removed in the clinic.  Over the next few months he had increasing pain in his left foot.  He transferred his care to [facility] and underwent planned surgery yesterday.      SUBJECTIVE / INTERVAL HISTORY:   Reilly Borja returns for follow up of hospital stay.  He was admitted at [facility] from 9/17-9/20.  He underwent hardware removal on 9/17.  For for cultures with MSSL.  PICC line was placed and he was discharged on IV cefazolin.  He was seen in the ER a few weeks after discharge after his PICC line had migrated to the carotid vein.  PICC line was replaced.  He has had no other troubles with IV antibiotics or the PICC line.  He has noted improvement in the swelling of his left foot, but he still has some ongoing swelling.  He also has an area of persistent drainage on the left foot that is unchanged.  He has followed up with Dr. Campbell at [facility].      ROS:   No fevers, no rashes, no diarrhea      Current Outpatient Medications:      acetaminophen (TYLENOL) 500 MG tablet, Take 2 tablets (1,000 mg) by mouth every 8 hours, Disp: , Rfl:      atorvastatin (LIPITOR) 80 MG tablet, Take 40 mg by mouth daily , Disp: , Rfl:      carvedilol (COREG) 12.5 MG tablet, Take 12.5 mg by mouth 2 times daily (with meals), Disp: , Rfl:      furosemide (LASIX) 20 MG tablet, Take 1 tablet (20 mg) by mouth 2 times daily, Disp: 14 tablet, Rfl: 0     glucose-vitamin C 4-6 GM-MG CHEW, Take 1 tablet by mouth every hour as needed , Disp: , Rfl:       insulin glargine (LANTUS VIAL) 100 UNIT/ML vial, Inject 30 Units Subcutaneous 2 times daily , Disp: , Rfl:      losartan (COZAAR) 50 MG tablet, Take 50 mg by mouth At Bedtime, Disp: , Rfl:      metFORMIN (GLUCOPHAGE) 1000 MG tablet, Take 1,000 mg by mouth 2 times daily (with meals), Disp: , Rfl:      multivitamin (THERMEMS) TABS, Take 1 tablet by mouth daily, Disp: , Rfl:      Semaglutide,0.25 or 0.5MG/DOS, 2 MG/1.5ML SOPN, Inject 0.5 mg Subcutaneous every 7 days Sunday, Disp: , Rfl:       OBJECTIVE:  BP (!) 148/82   Pulse 80   Temp 97.6  F (36.4  C) (Oral)   Wt 116.9 kg (257 lb 12.8 oz)   BMI 33.10 kg/m        GEN: No acute distress.    HENT: Head is normocephalic, atraumatic.   EYES: Eyes have anicteric sclerae without conjunctival injection   RESPIRATORY:  Normal breathing pattern.   EXTREMITIES: right upper extremity PICC line is in place without any surrounding erythema.  Left foot with mild swelling.  Pinpoint area of serous drainage at his incision site on the medial foot.  No underlying fluctuance, tenderness, or surrounding erythema  SKIN/HAIR/NAILS:  No rashes  NEUROLOGIC: Grossly nonfocal.       Pertinent labs:    Lab Results   Component Value Date    CRP 6.4 11/03/2021         Lab Results   Component Value Date    ALT 33 05/28/2021    AST 20 11/03/2021    ALKPHOS 562 (H) 05/28/2021         MICROBIOLOGY DATA:  9/17 wound cultures: Staph lugdunensis, sensitive to oxacillin and doxycycline    RADIOLOGY:  Reviewed    Attestation:  Total time preparing to see this patient, face-to-face time, and coordinating care time on the same calendar date: 24 minutes.  Face-face time: 16 minutes.  Over 50% of face-to-face time was spent in counseling/coordination of care.

## 2021-11-13 NOTE — ED NOTES
"Pt states he became \"extremely soa\" last night.  No cp, leg pain, or recent illness.  Pt also states that he has gained 9 lbs in the last week.  Pt admits to not talking his \"blood pressure meds\" for a week but just started taking them again.   " normal...

## 2021-12-02 ENCOUNTER — OFFICE VISIT (OUTPATIENT)
Dept: INFECTIOUS DISEASES | Facility: CLINIC | Age: 71
End: 2021-12-02
Payer: MEDICARE

## 2021-12-02 ENCOUNTER — LAB (OUTPATIENT)
Dept: LAB | Facility: CLINIC | Age: 71
End: 2021-12-02

## 2021-12-02 VITALS — SYSTOLIC BLOOD PRESSURE: 130 MMHG | DIASTOLIC BLOOD PRESSURE: 70 MMHG | HEART RATE: 72 BPM | TEMPERATURE: 97.6 F

## 2021-12-02 DIAGNOSIS — M86.672 OTHER CHRONIC OSTEOMYELITIS OF LEFT FOOT (H): ICD-10-CM

## 2021-12-02 DIAGNOSIS — M86.672 OTHER CHRONIC OSTEOMYELITIS OF LEFT FOOT (H): Primary | ICD-10-CM

## 2021-12-02 LAB
BASOPHILS # BLD AUTO: 0 10E3/UL (ref 0–0.2)
BASOPHILS NFR BLD AUTO: 1 %
C REACTIVE PROTEIN LHE: 0.8 MG/DL (ref 0–0.8)
EOSINOPHIL # BLD AUTO: 0.2 10E3/UL (ref 0–0.7)
EOSINOPHIL NFR BLD AUTO: 4 %
ERYTHROCYTE [DISTWIDTH] IN BLOOD BY AUTOMATED COUNT: 16.3 % (ref 10–15)
HCT VFR BLD AUTO: 40.4 % (ref 40–53)
HGB BLD-MCNC: 12.9 G/DL (ref 13.3–17.7)
IMM GRANULOCYTES # BLD: 0 10E3/UL
IMM GRANULOCYTES NFR BLD: 0 %
LYMPHOCYTES # BLD AUTO: 1.4 10E3/UL (ref 0.8–5.3)
LYMPHOCYTES NFR BLD AUTO: 32 %
MCH RBC QN AUTO: 27.9 PG (ref 26.5–33)
MCHC RBC AUTO-ENTMCNC: 31.9 G/DL (ref 31.5–36.5)
MCV RBC AUTO: 87 FL (ref 78–100)
MONOCYTES # BLD AUTO: 0.4 10E3/UL (ref 0–1.3)
MONOCYTES NFR BLD AUTO: 10 %
NEUTROPHILS # BLD AUTO: 2.3 10E3/UL (ref 1.6–8.3)
NEUTROPHILS NFR BLD AUTO: 53 %
PLATELET # BLD AUTO: 195 10E3/UL (ref 150–450)
RBC # BLD AUTO: 4.63 10E6/UL (ref 4.4–5.9)
WBC # BLD AUTO: 4.4 10E3/UL (ref 4–11)

## 2021-12-02 PROCEDURE — 86141 C-REACTIVE PROTEIN HS: CPT

## 2021-12-02 PROCEDURE — 36415 COLL VENOUS BLD VENIPUNCTURE: CPT

## 2021-12-02 PROCEDURE — 85025 COMPLETE CBC W/AUTO DIFF WBC: CPT

## 2021-12-02 PROCEDURE — 99213 OFFICE O/P EST LOW 20 MIN: CPT | Performed by: INTERNAL MEDICINE

## 2021-12-02 NOTE — PATIENT INSTRUCTIONS
Complete your current course of antibiotic. Continue with local compression of your foot/ankle. Watch for changes in water weight.

## 2021-12-02 NOTE — PROGRESS NOTES
[facility] INFECTIOUS DISEASE CLINIC - [place]   FOLLOW UP NOTE    Date: 11/4/2021  Patient Name: Reilly Borja   YOB: 1950  MRN: [id number]      ASSESSMENT:  71-year-old male with a history of diabetes, hypertension, who was admitted for revision of Charcot foot repair, left.     1. Charcot foot, left.  Status post hardware placement for repair in March/April 2021.  Required revision surgery a few weeks later for loosening of hardware.  Interestingly had one of his bolts poking through the skin, requiring his physician to remove it in the clinic  2. Persistent foot pain following surgery.  [facility] [place] [facility].  Underwent hardware removal on 9/17.  Unexpected finding of purulence.  Multiple cultures collected, including bone cultures all growing MSSlugdunensis.  No antibiotics or systemic signs of infection prior to procedure.  Completed 6+ weeks of IV antibiotics with cefazolin.  Inflammatory markers normal.  Had some drainage at last visit, so we continued on po doxycycline. Wound healed without drainage today.      PLAN:  -completes 4 wk course of doxycycline today  -monitor off antibiotics  -cbc and CRP today  -f/up in ortho clinic  -flu shot and COVID-19 booster recommended      Michael Marlow MD  [facility]   Clinic phone: [phone]  Clinic fax: [phone]    ______________________________________________________________________    HISTORY OF PRESENT ILLNESS:   From inpatient ID consult on 9/18/2021:      Reilly Borja is a 71 year old man with a history of diabetes, hypertension who underwent elective revision of Charcot foot repair on 9/17.  Intraoperatively, purulent material was found, raising concern for infection.  Patient underwent hardware removal with multiple cultures collected.  He was admitted to the hospital for work-up of infection and IV antibiotics.     The patient is a relatively active person, he  still works.  [facility]ot [facility] surgery in the spring of this year.  There was some loosening after surgery and he needed revision surgery a few weeks later.  After his second surgery he saw that one of the rods was poking out of his foot.  He went back to his clinic where the yasir was removed in the clinic.  Over the next few months he had increasing pain in his left foot.  He transferred his care to [facility] and underwent planned surgery yesterday.      SUBJECTIVE / INTERVAL HISTORY:   Reilly Borja returns for follow up. No complaints. Some swelling remains in foot, otherwise incision site is healed without drainage. Notices more weight changes, that he thinks if from extra fluid. No shortness of breath.      ROS:   No fevers, no rashes, no diarrhea      Current Outpatient Medications:      aspirin (ASA) 81 MG EC tablet, Take 1 tablet by mouth daily, Disp: , Rfl:      sacubitril-valsartan (ENTRESTO) 24-26 MG per tablet, TAKE 1 TABLET BY MOUTH TWO TIMES A DAY *REPLACES LOSARTAN 11/21*, Disp: , Rfl:      acetaminophen (TYLENOL) 500 MG tablet, Take 2 tablets (1,000 mg) by mouth every 8 hours, Disp: , Rfl:      atorvastatin (LIPITOR) 80 MG tablet, Take 40 mg by mouth daily , Disp: , Rfl:      carvedilol (COREG) 12.5 MG tablet, Take 12.5 mg by mouth 2 times daily (with meals), Disp: , Rfl:      doxycycline hyclate (VIBRAMYCIN) 100 MG capsule, Take 1 capsule (100 mg) by mouth 2 times daily for 28 days, Disp: 56 capsule, Rfl: 0     furosemide (LASIX) 20 MG tablet, Take 1 tablet (20 mg) by mouth 2 times daily, Disp: 14 tablet, Rfl: 0     glucose-vitamin C 4-6 GM-MG CHEW, Take 1 tablet by mouth every hour as needed , Disp: , Rfl:      insulin glargine (LANTUS VIAL) 100 UNIT/ML vial, Inject 30 Units Subcutaneous 2 times daily , Disp: , Rfl:      losartan (COZAAR) 50 MG tablet, Take 50 mg by mouth At Bedtime, Disp: , Rfl:      metFORMIN (GLUCOPHAGE) 1000 MG tablet,  Take 1,000 mg by mouth 2 times daily (with meals), Disp: , Rfl:      multivitamin (THERMEMS) TABS, Take 1 tablet by mouth daily, Disp: , Rfl:      Semaglutide,0.25 or 0.5MG/DOS, 2 MG/1.5ML SOPN, Inject 0.5 mg Subcutaneous every 7 days Sunday, Disp: , Rfl:       OBJECTIVE:  /70   Pulse 72   Temp 97.6  F (36.4  C)       GEN: No acute distress.    HENT: Head is normocephalic, atraumatic.   EYES: Eyes have anicteric sclerae without conjunctival injection   RESPIRATORY:  Clear bilaterally  EXTREMITIES: Left foot has medial swelling but no redness. Incision site is intact without drainage.  SKIN/HAIR/NAILS:  No rashes  NEUROLOGIC: Grossly nonfocal.       Pertinent labs:    Lab Results   Component Value Date    CRP 6.4 11/03/2021         Lab Results   Component Value Date    ALT 33 05/28/2021    AST 20 11/03/2021    ALKPHOS 562 (H) 05/28/2021         MICROBIOLOGY DATA:  9/17 wound cultures: Staph lugdunensis, sensitive to oxacillin and doxycycline    RADIOLOGY:  Reviewed    Attestation:  Total time preparing to see this patient, face-to-face time, and coordinating care time on the same calendar date: 14 minutes.  Face-face time: 10 minutes.  Over 50% of face-to-face time was spent in counseling/coordination of care.

## 2021-12-20 ENCOUNTER — HOSPITAL ENCOUNTER (OUTPATIENT)
Dept: NUCLEAR MEDICINE | Facility: HOSPITAL | Age: 71
End: 2021-12-20
Attending: ORTHOPAEDIC SURGERY
Payer: MEDICARE

## 2021-12-20 DIAGNOSIS — M79.673 FOOT PAIN: ICD-10-CM

## 2021-12-20 PROCEDURE — 343N000001 HC RX 343: Performed by: ORTHOPAEDIC SURGERY

## 2021-12-20 PROCEDURE — 78800 RP LOCLZJ TUM 1 AREA 1 D IMG: CPT

## 2021-12-20 PROCEDURE — A9541 TC99M SULFUR COLLOID: HCPCS | Performed by: ORTHOPAEDIC SURGERY

## 2021-12-20 PROCEDURE — 250N000011 HC RX IP 250 OP 636: Performed by: ORTHOPAEDIC SURGERY

## 2021-12-20 PROCEDURE — A9547 IN111 OXYQUINOLINE: HCPCS | Performed by: ORTHOPAEDIC SURGERY

## 2021-12-20 RX ORDER — HEPARIN SODIUM 1000 [USP'U]/ML
2 INJECTION, SOLUTION INTRAVENOUS; SUBCUTANEOUS ONCE
Status: COMPLETED | OUTPATIENT
Start: 2021-12-20 | End: 2021-12-20

## 2021-12-20 RX ORDER — INDIUM IN-111 OXYQUINOLINE 1 UG/ML
100-900 SOLUTION INTRAVENOUS ONCE
Status: COMPLETED | OUTPATIENT
Start: 2021-12-20 | End: 2021-12-20

## 2021-12-20 RX ADMIN — HEPARIN SODIUM 2000 UNITS: 1000 INJECTION INTRAVENOUS; SUBCUTANEOUS at 12:26

## 2021-12-20 RX ADMIN — INDIUM IN-111 OXYQUINOLINE 723 UCI.: 1 SOLUTION INTRAVENOUS at 12:28

## 2021-12-21 ENCOUNTER — HOSPITAL ENCOUNTER (OUTPATIENT)
Dept: NUCLEAR MEDICINE | Facility: HOSPITAL | Age: 71
End: 2021-12-21
Attending: ORTHOPAEDIC SURGERY
Payer: MEDICARE

## 2021-12-21 DIAGNOSIS — M79.673 FOOT PAIN: ICD-10-CM

## 2021-12-21 PROCEDURE — 78102 BONE MARROW IMAGING LTD: CPT

## 2021-12-21 PROCEDURE — A9541 TC99M SULFUR COLLOID: HCPCS | Performed by: ORTHOPAEDIC SURGERY

## 2021-12-21 PROCEDURE — 343N000001 HC RX 343: Performed by: ORTHOPAEDIC SURGERY

## 2021-12-21 RX ADMIN — Medication 26.5 MILLICURIE: at 11:44

## 2022-01-18 ENCOUNTER — TRANSFERRED RECORDS (OUTPATIENT)
Dept: HEALTH INFORMATION MANAGEMENT | Facility: CLINIC | Age: 72
End: 2022-01-18

## 2022-01-27 NOTE — PROGRESS NOTES
This is a recent snapshot of the patient's [facility] medical record.  For current drug dose and complete information and questions, call [phone] or In Basket pool, [facility] ([id number])  CSN Number:  [id number]

## 2022-02-07 NOTE — PROGRESS NOTES
This is a recent snapshot of the patient's [facility] medical record.  For current drug dose and complete information and questions, call [phone] or In Basket pool, [facility] home infusion ([id number])  CSN Number:  [id number]

## 2022-02-10 ENCOUNTER — LAB REQUISITION (OUTPATIENT)
Dept: LAB | Facility: CLINIC | Age: 72
End: 2022-02-10
Payer: MEDICARE

## 2022-02-10 ENCOUNTER — TRANSFERRED RECORDS (OUTPATIENT)
Dept: HEALTH INFORMATION MANAGEMENT | Facility: CLINIC | Age: 72
End: 2022-02-10

## 2022-02-10 DIAGNOSIS — M14.622: ICD-10-CM

## 2022-02-10 DIAGNOSIS — M14.672 CHARCOT'S JOINT, LEFT ANKLE AND FOOT: ICD-10-CM

## 2022-02-10 PROCEDURE — 88307 TISSUE EXAM BY PATHOLOGIST: CPT | Mod: TC,ORL | Performed by: ORTHOPAEDIC SURGERY

## 2022-02-10 PROCEDURE — 87075 CULTR BACTERIA EXCEPT BLOOD: CPT | Mod: ORL,XS | Performed by: ORTHOPAEDIC SURGERY

## 2022-02-10 PROCEDURE — 87075 CULTR BACTERIA EXCEPT BLOOD: CPT | Mod: ORL | Performed by: ORTHOPAEDIC SURGERY

## 2022-02-10 PROCEDURE — 87070 CULTURE OTHR SPECIMN AEROBIC: CPT | Mod: ORL | Performed by: ORTHOPAEDIC SURGERY

## 2022-02-10 PROCEDURE — 87077 CULTURE AEROBIC IDENTIFY: CPT | Mod: ORL | Performed by: ORTHOPAEDIC SURGERY

## 2022-02-10 PROCEDURE — 87176 TISSUE HOMOGENIZATION CULTR: CPT | Mod: ORL | Performed by: ORTHOPAEDIC SURGERY

## 2022-02-12 LAB — BACTERIA SPEC CULT: NO GROWTH

## 2022-02-14 LAB
BACTERIA TISS BX CULT: ABNORMAL
PATH REPORT.COMMENTS IMP SPEC: NORMAL
PATH REPORT.COMMENTS IMP SPEC: NORMAL
PATH REPORT.FINAL DX SPEC: NORMAL
PATH REPORT.GROSS SPEC: NORMAL
PATH REPORT.MICROSCOPIC SPEC OTHER STN: NORMAL
PATH REPORT.RELEVANT HX SPEC: NORMAL
PHOTO IMAGE: NORMAL

## 2022-02-14 PROCEDURE — 88311 DECALCIFY TISSUE: CPT | Mod: 26 | Performed by: PATHOLOGY

## 2022-02-14 PROCEDURE — 88307 TISSUE EXAM BY PATHOLOGIST: CPT | Mod: 26 | Performed by: PATHOLOGY

## 2022-02-17 LAB
BACTERIA BONE ANAEROBE+AEROBE CULT: NO GROWTH
BACTERIA BONE ANAEROBE+AEROBE CULT: NORMAL
BACTERIA SPEC CULT: NORMAL
BACTERIA TISS BX CULT: NORMAL

## 2022-05-20 ENCOUNTER — ANCILLARY PROCEDURE (OUTPATIENT)
Dept: PET IMAGING | Facility: CLINIC | Age: 72
End: 2022-05-20
Attending: NURSE PRACTITIONER
Payer: MEDICARE

## 2022-05-20 DIAGNOSIS — I50.22 CHRONIC SYSTOLIC CONGESTIVE HEART FAILURE (H): ICD-10-CM

## 2022-05-20 DIAGNOSIS — Z09 ENCOUNTER FOR FOLLOW-UP EXAMINATION AFTER COMPLETED TREATMENT FOR CONDITIONS OTHER THAN MALIGNANT NEOPLASM: ICD-10-CM

## 2022-05-20 LAB — GLUCOSE SERPL-MCNC: 241 MG/DL (ref 70–99)

## 2022-05-23 ENCOUNTER — ANCILLARY PROCEDURE (OUTPATIENT)
Dept: PET IMAGING | Facility: CLINIC | Age: 72
End: 2022-05-23
Attending: NURSE PRACTITIONER
Payer: MEDICARE

## 2022-05-23 DIAGNOSIS — Z09 ENCOUNTER FOR FOLLOW-UP EXAMINATION AFTER COMPLETED TREATMENT FOR CONDITIONS OTHER THAN MALIGNANT NEOPLASM: ICD-10-CM

## 2022-05-23 DIAGNOSIS — I50.22 CHRONIC SYSTOLIC CONGESTIVE HEART FAILURE (H): ICD-10-CM

## 2022-05-23 LAB — GLUCOSE SERPL-MCNC: 141 MG/DL (ref 70–99)

## 2022-08-03 ENCOUNTER — TRANSCRIBE ORDERS (OUTPATIENT)
Dept: OTHER | Age: 72
End: 2022-08-03

## 2022-08-03 DIAGNOSIS — M79.672 PAIN IN LEFT FOOT: Primary | ICD-10-CM

## 2022-08-13 NOTE — TELEPHONE ENCOUNTER
Action August 13, 2022 5:50 PM MT   Action Taken -Sent a request to [facility] for records and imaging [phone] .  -Sent a request to [facility] for records and images [phone].  -Sent a request to [facility] for imaging [phone].      Action August 17, 2022 8:03 PM MT   Action Taken -[facility] resolved to PACS.       DIAGNOSIS: Left medial foot pain, h/o Charcot foot reconstruction 05/11/2021 / [facility] Britt[facility] / [facility]o consult / [facility]   APPOINTMENT DATE: 08/30/2022   NOTES STATUS DETAILS   OFFICE NOTE from referring provider Ortho Britton Patterson MD - [facility]     OFFICE NOTE from other specialist [facility] 03/07/2022 - Lakesha Tejeda - [facility]    12/02/2021 - Michael Marlow MD - [facility] Infectious Disease   OPERATIVE REPORT EPIC 02/10/2022 - Irrigation and Debridement of LT Foot/Biopsy of LT Hindfoot ([facility])    09/17/2021 - Removal of Hardware LT Foot Deep/ Irrigation and Debridement with Bone Biopsy LT Foot ([facility])    05/11/2021 - LT Foot Hardware Removal with Implantation ([facility])    03/29/2021 - LT Foot Charcot Reconstructions w/ Internal Beam Screw Placement ([facility])   MEDICATION LIST Care Everywhere/Internal/ Media Tab    IMPLANT RECORD/STICKER Care Everywhere/Internal/ Media Tab    LABS     CBC/DIFF Internal 12/02/2021   CULTURES Internal 02/10/2022   MRI PACS    CT SCAN PACS    XRAYS (IMAGES & REPORTS) PACS    TUMOR     PATHOLOGY  Slides & report Internal Case: [id number]   Collected: 02/10/2022  Specimen(s): LT Foot

## 2022-08-29 ENCOUNTER — TELEPHONE (OUTPATIENT)
Dept: FAMILY MEDICINE | Facility: CLINIC | Age: 72
End: 2022-08-29

## 2022-08-29 DIAGNOSIS — M25.572 PAIN IN LEFT ANKLE: Primary | ICD-10-CM

## 2022-08-29 DIAGNOSIS — M14.672 CHARCOT'S JOINT OF LEFT FOOT: ICD-10-CM

## 2022-08-29 NOTE — TELEPHONE ENCOUNTER
Patient Quality Outreach    Patient is due for the following:   Colon Cancer Screening    Next Steps:   Schedule a Annual Wellness Visit    Type of outreach:    Sent letter.      Questions for provider review:    None     Petra Chauhan, CMA

## 2022-08-29 NOTE — LETTER
August 29, 2022      Reilly Borja  [address] [address]      Your healthcare team cares about your health. To provide you with the best care,   we have reviewed your chart and based on our findings, we see that you are due to:     - COLON CANCER SCREENING:  Call or mychart the clinic to schedule your colonoscopy or schedule/ your FIT Test, or Cologuard test    If you have already completed these items, please contact the clinic via phone or   Mychart so your care team can review and update your records. Thank you for   choosing [facility] for your healthcare needs. For any questions,   concerns, or to schedule an appointment please contact the clinic.       Healthy Regards,      Your [facility] Care Team

## 2022-08-30 ENCOUNTER — OFFICE VISIT (OUTPATIENT)
Dept: ORTHOPEDICS | Facility: CLINIC | Age: 72
End: 2022-08-30
Payer: COMMERCIAL

## 2022-08-30 ENCOUNTER — HOSPITAL ENCOUNTER (OUTPATIENT)
Dept: CT IMAGING | Facility: CLINIC | Age: 72
Discharge: HOME | End: 2022-08-30
Attending: ORTHOPAEDIC SURGERY | Admitting: ORTHOPAEDIC SURGERY
Payer: COMMERCIAL

## 2022-08-30 ENCOUNTER — PRE VISIT (OUTPATIENT)
Dept: ORTHOPEDICS | Facility: CLINIC | Age: 72
End: 2022-08-30

## 2022-08-30 ENCOUNTER — ANCILLARY PROCEDURE (OUTPATIENT)
Dept: GENERAL RADIOLOGY | Facility: CLINIC | Age: 72
End: 2022-08-30
Attending: ORTHOPAEDIC SURGERY
Payer: COMMERCIAL

## 2022-08-30 ENCOUNTER — LAB (OUTPATIENT)
Dept: LAB | Facility: CLINIC | Age: 72
End: 2022-08-30
Payer: MEDICARE

## 2022-08-30 VITALS — HEIGHT: 73 IN | WEIGHT: 258 LBS | BODY MASS INDEX: 34.19 KG/M2

## 2022-08-30 DIAGNOSIS — M14.672 CHARCOT'S JOINT OF LEFT FOOT: ICD-10-CM

## 2022-08-30 DIAGNOSIS — M79.672 PAIN IN LEFT FOOT: ICD-10-CM

## 2022-08-30 DIAGNOSIS — R73.9 HYPERGLYCEMIA, UNSPECIFIED: ICD-10-CM

## 2022-08-30 LAB
BASOPHILS # BLD AUTO: 0 10E3/UL (ref 0–0.2)
BASOPHILS NFR BLD AUTO: 0 %
CRP SERPL-MCNC: 19.2 MG/L
EOSINOPHIL # BLD AUTO: 0.1 10E3/UL (ref 0–0.7)
EOSINOPHIL NFR BLD AUTO: 1 %
ERYTHROCYTE [DISTWIDTH] IN BLOOD BY AUTOMATED COUNT: 13.8 % (ref 10–15)
ERYTHROCYTE [SEDIMENTATION RATE] IN BLOOD BY WESTERGREN METHOD: 34 MM/HR (ref 0–20)
HBA1C MFR BLD: 8.8 % (ref 0–5.6)
HCT VFR BLD AUTO: 40 % (ref 40–53)
HGB BLD-MCNC: 12.9 G/DL (ref 13.3–17.7)
IMM GRANULOCYTES # BLD: 0 10E3/UL
IMM GRANULOCYTES NFR BLD: 1 %
LYMPHOCYTES # BLD AUTO: 1.4 10E3/UL (ref 0.8–5.3)
LYMPHOCYTES NFR BLD AUTO: 19 %
MCH RBC QN AUTO: 29.5 PG (ref 26.5–33)
MCHC RBC AUTO-ENTMCNC: 32.3 G/DL (ref 31.5–36.5)
MCV RBC AUTO: 91 FL (ref 78–100)
MONOCYTES # BLD AUTO: 0.6 10E3/UL (ref 0–1.3)
MONOCYTES NFR BLD AUTO: 9 %
NEUTROPHILS # BLD AUTO: 5 10E3/UL (ref 1.6–8.3)
NEUTROPHILS NFR BLD AUTO: 70 %
NRBC # BLD AUTO: 0 10E3/UL
NRBC BLD AUTO-RTO: 0 /100
PLATELET # BLD AUTO: 283 10E3/UL (ref 150–450)
RBC # BLD AUTO: 4.38 10E6/UL (ref 4.4–5.9)
WBC # BLD AUTO: 7.1 10E3/UL (ref 4–11)

## 2022-08-30 PROCEDURE — 86140 C-REACTIVE PROTEIN: CPT | Performed by: PATHOLOGY

## 2022-08-30 PROCEDURE — 85652 RBC SED RATE AUTOMATED: CPT | Performed by: PATHOLOGY

## 2022-08-30 PROCEDURE — 73700 CT LOWER EXTREMITY W/O DYE: CPT | Mod: LT

## 2022-08-30 PROCEDURE — 99204 OFFICE O/P NEW MOD 45 MIN: CPT | Performed by: ORTHOPAEDIC SURGERY

## 2022-08-30 PROCEDURE — 73630 X-RAY EXAM OF FOOT: CPT | Mod: LT | Performed by: RADIOLOGY

## 2022-08-30 PROCEDURE — 36415 COLL VENOUS BLD VENIPUNCTURE: CPT | Performed by: PATHOLOGY

## 2022-08-30 PROCEDURE — 83036 HEMOGLOBIN GLYCOSYLATED A1C: CPT | Performed by: PATHOLOGY

## 2022-08-30 PROCEDURE — 85025 COMPLETE CBC W/AUTO DIFF WBC: CPT | Performed by: PATHOLOGY

## 2022-08-30 RX ORDER — TORSEMIDE 20 MG/1
40 TABLET ORAL 2 TIMES DAILY
COMMUNITY

## 2022-08-30 RX ORDER — SPIRONOLACTONE 25 MG/1
25 TABLET ORAL
COMMUNITY

## 2022-08-30 NOTE — LETTER
8/30/2022         RE: Reilly Borja  [address] [address]        Dear Colleague,    Thank you for referring your patient, Reilly Borja, to the [facility] [place]. Please see a copy of my visit note below.    CHIEF COMPLAINT:  Left foot pain.    HISTORY OF PRESENT ILLNESS:  Mr. Borja is a 72-year-old male who presents today for evaluation of his left foot.  The patient reports to have sustained a Charcot arthropathy in the left foot with subsequent infection.  The infection took place around the last quarter of 2021.  The patient has been free of antibiotics since 2022.    He reports now to have pain and discomfort.  He reports to have undergone an episode of reconstruction at the [facility], which apparently did not work out.  I suspected that, as he came with some loose pins.    He reports to have pain along the mid foot region.  This is quite significant.  He reports to be unable to wear any shoes for any period longer than 2 hours.  He also reports to have been very active in the past, something that he cannot do now secondary to the foot pain.    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    He also reports to have type 2 diabetes and to have a hemoglobin A1c of 7.3, which was drawn in 09/2021.    The patient also reports to be miserable enough that he would undergo whatever procedure it takes in order to improve his discomfort.    PAST MEDICAL HISTORY:  Reviewed today, which is quite extensive.    PAST SURGICAL HISTORY:  Reviewed today.    DRUG ALLERGIES:  Simvastatin.    CURRENT MEDICATIONS:  Please refer to encounter form.    PHYSICAL EXAMINATION:  On today's visit, he presents as a pleasant male in no apparent distress with a height of 6 feet 1 inch and a weight of 258 pounds.  He denies to have any constitutional symptoms.    On today's visit, he presents with a  left foot with a slight flatfoot deformity and a slight recurvatum.  I cannot palpate any pulses.  There are no other signs of infection.  There is a well-healed surgical incision along the medial aspect of the foot.  Forefoot exam is unremarkable.    IMAGING:  Plain x-rays of the foot were obtained today, which are significant for showing what seems to be a full disconnect across the Chopart joint of the foot.  There is what seems to be a solid fusion of the Lisfranc joint.  There is no obvious radiolucency.  There are no changes when compared to x-rays from 07/11/2022.    ASSESSMENT:  Left foot pain secondary to Charcot arthropathy.    PLAN:  I discussed with the patient that we are going to proceed with planning a surgical intervention, which will be some sort of an arthrodesis.    For the time being, we are going to proceed with a collection of a CBC with differential, sed rate and CRP to rule out any infection.  This will be followed by an A1c to confirm that he is a surgical candidate, which will be followed by consultation to Vascular Surgery to make sure that he has enough blood flow to heal a mid foot fusion.    Finally, we will need a CT scan to understand the bony anatomy and to understand exactly what type of fusion he will require.    All questions were answered.  The patient was pleased with the discussion.  He has no activity restrictions.  He will follow up accordingly.    TT:  30 minutes.  CT:  20 minutes.      Orlando Duarte MD

## 2022-08-30 NOTE — NURSING NOTE
"Reason For Visit:   Chief Complaint   Patient presents with     Consult     Left medial foot pain, h/o Charcot foot reconstruction 05/11/2021 /  Dr. Britton Whitman, [facility]        Ht 1.863 m (6' 1.35\")   Wt 117 kg (258 lb)   BMI 33.72 kg/m      Pain Assessment  Patient Currently in Pain: Yes  0-10 Pain Scale: 5  Primary Pain Location: Foot    Meg Formato, LPN  "

## 2022-08-30 NOTE — PROGRESS NOTES
CHIEF COMPLAINT:  Left foot pain.    HISTORY OF PRESENT ILLNESS:  Mr. Borja is a 72-year-old male who presents today for evaluation of his left foot.  The patient reports to have sustained a Charcot arthropathy in the left foot with subsequent infection.  The infection took place around the last quarter of 2021.  The patient has been free of antibiotics since 2022.    He reports now to have pain and discomfort.  He reports to have undergone an episode of reconstruction at the [facility], which apparently did not work out.  I suspected that, as he came with some loose pins.    He reports to have pain along the mid foot region.  This is quite significant.  He reports to be unable to wear any shoes for any period longer than 2 hours.  He also reports to have been very active in the past, something that he cannot do now secondary to the foot pain.    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    He also reports to have type 2 diabetes and to have a hemoglobin A1c of 7.3, which was drawn in 09/2021.    The patient also reports to be miserable enough that he would undergo whatever procedure it takes in order to improve his discomfort.    PAST MEDICAL HISTORY:  Reviewed today, which is quite extensive.    PAST SURGICAL HISTORY:  Reviewed today.    DRUG ALLERGIES:  Simvastatin.    CURRENT MEDICATIONS:  Please refer to encounter form.    PHYSICAL EXAMINATION:  On today's visit, he presents as a pleasant male in no apparent distress with a height of 6 feet 1 inch and a weight of 258 pounds.  He denies to have any constitutional symptoms.    On today's visit, he presents with a left foot with a slight flatfoot deformity and a slight recurvatum.  I cannot palpate any pulses.  There are no other signs of infection.  There is a well-healed surgical incision along the medial aspect of the foot.  Forefoot exam is unremarkable.    IMAGING:  Plain x-rays of the  foot were obtained today, which are significant for showing what seems to be a full disconnect across the Chopart joint of the foot.  There is what seems to be a solid fusion of the Lisfranc joint.  There is no obvious radiolucency.  There are no changes when compared to x-rays from 07/11/2022.    ASSESSMENT:  Left foot pain secondary to Charcot arthropathy.    PLAN:  I discussed with the patient that we are going to proceed with planning a surgical intervention, which will be some sort of an arthrodesis.    For the time being, we are going to proceed with a collection of a CBC with differential, sed rate and CRP to rule out any infection.  This will be followed by an A1c to confirm that he is a surgical candidate, which will be followed by consultation to Vascular Surgery to make sure that he has enough blood flow to heal a mid foot fusion.    Finally, we will need a CT scan to understand the bony anatomy and to understand exactly what type of fusion he will require.    All questions were answered.  The patient was pleased with the discussion.  He has no activity restrictions.  He will follow up accordingly.    TT:  30 minutes.  CT:  20 minutes.

## 2022-09-08 ENCOUNTER — TELEPHONE (OUTPATIENT)
Dept: VASCULAR SURGERY | Facility: CLINIC | Age: 72
End: 2022-09-08

## 2022-09-08 DIAGNOSIS — Z79.4 TYPE 2 DIABETES MELLITUS WITH COMPLICATION, WITH LONG-TERM CURRENT USE OF INSULIN (H): ICD-10-CM

## 2022-09-08 DIAGNOSIS — M25.572 LEFT ANKLE PAIN, UNSPECIFIED CHRONICITY: ICD-10-CM

## 2022-09-08 DIAGNOSIS — R09.89 OTHER SPECIFIED SYMPTOMS AND SIGNS INVOLVING THE CIRCULATORY AND RESPIRATORY SYSTEMS: ICD-10-CM

## 2022-09-08 DIAGNOSIS — E11.8 TYPE 2 DIABETES MELLITUS WITH COMPLICATION, WITH LONG-TERM CURRENT USE OF INSULIN (H): ICD-10-CM

## 2022-09-08 DIAGNOSIS — M14.672 CHARCOT'S JOINT OF LEFT FOOT: Primary | ICD-10-CM

## 2022-09-08 NOTE — TELEPHONE ENCOUNTER
Will place in clinic. Workqueue updated.    HOLLY FaganN, RN  [facility]  Ph: [phone]  Fax: [phone]

## 2022-09-08 NOTE — TELEPHONE ENCOUNTER
New vascular referral   Preferred Location:  [facility] [place]  Reason for Referral:  Assess vascular flow to LLE for upcoming surgery of L foot    Referring provider: Orlando Duarte MD     Most recent imaging? No vascular imaging in chart.    Additional info: 71 y/o male with L foot Charcot arthropathy. PMHx DM II, CHF, HTN.  Pt being followed by orthopedics with plan for possible L foot procedure. He is referred to vascular to ensure he has adequate bloodflow to this foot to heal s/p surgical procedure.     Documentation routed to MD for review.    MAURA Fagan, RN  [facility]  Ph: [phone]  Fax: [phone]

## 2022-09-10 NOTE — TELEPHONE ENCOUNTER
DIAGNOSIS: Left ankle pain   DATE RECEIVED: 9.21.22   NOTES STATUS DETAILS   OFFICE NOTE from referring provider Internal 8.30.22  [place]  [facility]   MEDICATION LIST Internal    PERTINENT LABS Internal    CT Internal 8.30.22  CT Foot Left   XRay Internal 8.30.22, 11.4.21  XR Foot Left

## 2022-09-14 ENCOUNTER — ANCILLARY PROCEDURE (OUTPATIENT)
Dept: ULTRASOUND IMAGING | Facility: CLINIC | Age: 72
End: 2022-09-14
Attending: SURGERY
Payer: COMMERCIAL

## 2022-09-14 DIAGNOSIS — E11.8 TYPE 2 DIABETES MELLITUS WITH COMPLICATION, WITH LONG-TERM CURRENT USE OF INSULIN (H): ICD-10-CM

## 2022-09-14 DIAGNOSIS — R09.89 OTHER SPECIFIED SYMPTOMS AND SIGNS INVOLVING THE CIRCULATORY AND RESPIRATORY SYSTEMS: ICD-10-CM

## 2022-09-14 DIAGNOSIS — M25.572 LEFT ANKLE PAIN, UNSPECIFIED CHRONICITY: ICD-10-CM

## 2022-09-14 DIAGNOSIS — M14.672 CHARCOT'S JOINT OF LEFT FOOT: ICD-10-CM

## 2022-09-14 DIAGNOSIS — Z79.4 TYPE 2 DIABETES MELLITUS WITH COMPLICATION, WITH LONG-TERM CURRENT USE OF INSULIN (H): ICD-10-CM

## 2022-09-14 PROCEDURE — 93922 UPR/L XTREMITY ART 2 LEVELS: CPT | Performed by: RADIOLOGY

## 2022-09-20 ENCOUNTER — VIRTUAL VISIT (OUTPATIENT)
Dept: ORTHOPEDICS | Facility: CLINIC | Age: 72
End: 2022-09-20
Payer: COMMERCIAL

## 2022-09-20 DIAGNOSIS — R73.9 HYPERGLYCEMIA, UNSPECIFIED: ICD-10-CM

## 2022-09-20 DIAGNOSIS — M25.572 PAIN IN JOINT, ANKLE AND FOOT, LEFT: Primary | ICD-10-CM

## 2022-09-20 PROCEDURE — 99441 PR PHYSICIAN TELEPHONE EVALUATION 5-10 MIN: CPT | Mod: 95 | Performed by: ORTHOPAEDIC SURGERY

## 2022-09-20 NOTE — LETTER
Date:September 21, 2022      Patient was self referred, no letter generated. Do not send.        [facility] Health Information

## 2022-09-20 NOTE — PROGRESS NOTES
TELEPHONE VISIT    CHIEF COMPLAINT:  Status post left foot Charcot arthropathy.  Mr. Borja was reached by phone secondary to the pandemic.  The patient authorized the encounter.  I discussed with Mr. Borja the CT scan findings, which are significant for showing a large amount of destruction across the talonavicular and calcaneocuboid joints.  The ankle surprisingly enough looks pretty good, as well as the subtalar joint.    PLAN:  Discussed with the patient that we are going to proceed with injections of the talonavicular joint with Marcaine solution and he will go for a walk.  If the pain improvement is not sufficient, then we will proceed with a calcaneocuboid joint injection also with Marcaine only.  We are hoping that second injection will make him comfortable enough to plan on a procedure.    The patient understood the plan and agreed to proceed with it.  All questions were answered.    TT:  20 minutes.  CT:  15 minutes.

## 2022-09-20 NOTE — LETTER
9/20/2022         RE: Reilly Borja  [address] [address]        Dear Colleague,    Thank you for referring your patient, Reilly Borja, to the [facility] [place]. Please see a copy of my visit note below.    TELEPHONE VISIT    CHIEF COMPLAINT:  Status post left foot Charcot arthropathy.  Mr. Borja was reached by phone secondary to the pandemic.  The patient authorized the encounter.  I discussed with Mr. Borja the CT scan findings, which are significant for showing a large amount of destruction across the talonavicular and calcaneocuboid joints.  The ankle surprisingly enough looks pretty good, as well as the subtalar joint.    PLAN:  Discussed with the patient that we are going to proceed with injections of the talonavicular joint with Marcaine solution and he will go for a walk.  If the pain improvement is not sufficient, then we will proceed with a calcaneocuboid joint injection also with Marcaine only.  We are hoping that second injection will make him comfortable enough to plan on a procedure.    The patient understood the plan and agreed to proceed with it.  All questions were answered.    TT:  20 minutes.  CT:  15 minutes.          Again, thank you for allowing me to participate in the care of your patient.        Sincerely,        Orlando Duarte MD

## 2022-09-21 ENCOUNTER — VIRTUAL VISIT (OUTPATIENT)
Dept: VASCULAR SURGERY | Facility: CLINIC | Age: 72
End: 2022-09-21
Payer: COMMERCIAL

## 2022-09-21 ENCOUNTER — PRE VISIT (OUTPATIENT)
Dept: VASCULAR SURGERY | Facility: CLINIC | Age: 72
End: 2022-09-21

## 2022-09-21 DIAGNOSIS — M79.672 PAIN IN LEFT FOOT: ICD-10-CM

## 2022-09-21 PROCEDURE — 99203 OFFICE O/P NEW LOW 30 MIN: CPT | Mod: 95 | Performed by: SURGERY

## 2022-09-21 NOTE — NURSING NOTE
Chief Complaint   Patient presents with     Consult     [facility]. Medications/allergies erviewed with pt, pt states he has no known allergies, so unsure why Simvastatin is on there, please review.      Patient denies any changes since check-in regarding medication and allergies and states all information entered during check-in remains accurate.    Cecilia Gilbert, Visit Facilitator/MA.

## 2022-09-21 NOTE — PROGRESS NOTES
Reilly is a 72 year old who is being evaluated via a billable telephone visit.      What phone number would you like to be contacted at? [phone]  How would you like to obtain your AVS? Mail a copy    Cecilia Gilbert, Visit Trino/MA.        Subjective       HPI     Mr Borja is joined on telephone virtual visit today to discuss adequacy of arterial circulation to the left foot for upcoming surgery with Dr. Dixon.  He is a non-smoker with a history of diabetes.  He has a Charcot foot and had several broken bones that had attempted repair with pins which unfortunately worked their way out of the back of the foot.  He reports this happened twice.  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  He has no history of stroke, amaurosis or TIA.  Reports his incisions have always healed quickly on his foot after prior surgeries.  No issues with the right foot at this time.    Review of Systems   Constitutional, HEENT, cardiovascular, pulmonary, GI, , musculoskeletal, neuro, skin, endocrine and psych systems are negative, except as otherwise noted.      Objective           Vitals:  No vitals were obtained today due to virtual visit.    Physical Exam   healthy, alert and no distress  PSYCH: Alert and oriented times 3; coherent speech, normal   rate and volume, able to articulate logical thoughts, able   to abstract reason, no tangential thoughts, no hallucinations   or delusions  His affect is normal  RESP: No cough, no audible wheezing, able to talk in full sentences  Remainder of exam unable to be completed due to telephone visits    Ankle brachial indices show noncompressible vessels with triphasic waveforms at the ankle bilaterally.  The digital waveforms are normal on the left and mildly diminished on the right.    Assessment and Plan: Very pleasant 72-year-old diabetic male non-smoker former  with adequate arterial circulation to the left foot for planned procedure with  Dr. Dixon.  I do not anticipate any healing issues.  He will reach out to us if any difficulties arise.  Shahla Lloyd MD, DFSVS, RPVI  Director, [facility] Vascular Services  Chief, Vascular and Endovascular Surgery  [facility]  [email or web address]  Pager: 1. Send message or 10 digit call back number Securely via MemSQL with the MemSQL Web Console (learn more here)              2. Outside of [facility]? Call [phone]                Phone call duration: 20 minutes

## 2022-09-22 NOTE — PATIENT INSTRUCTIONS
"Thank you so much for choosing us for your care. It was a pleasure to see you at the vascular clinic today.     Follow-up recommendations: We will see you back in the vascular clinic on an \"as-needed\" basis. Please contact our clinic at [phone] if any issues arise. You may also send us a Hakia message with any concerns.    Additional testing/imaging ordered today: None      Our scheduling team will get in touch with you to set up any follow-up testing/imaging and/or appointments. Please be aware that any testing/imaging recommended today will need to completed prior to your next visit with the provider. If testing/imaging is not completed prior to your next visit, your visit may be rescheduled.        If you have any questions, please call Margareth Campbell RN at [phone] or contact our clinic at [phone]. We also encourage the use of Hakia to communicate with your HealthCare Provider.      If you have an urgent need after business hours (8:00 am to 4:30 pm) please call [phone], option 4, and ask for the vascular attending on call. For non-urgent after hours needs, please call the vascular nurse at [phone] and leave a detailed voicemail. For scheduling needs, please call our clinic directly at [phone].    "

## 2022-09-25 ENCOUNTER — HOSPITAL ENCOUNTER (EMERGENCY)
Facility: CLINIC | Age: 72
Discharge: HOME | End: 2022-09-25
Attending: EMERGENCY MEDICINE | Admitting: EMERGENCY MEDICINE
Payer: MEDICARE

## 2022-09-25 VITALS
BODY MASS INDEX: 32.08 KG/M2 | HEIGHT: 74 IN | OXYGEN SATURATION: 94 % | WEIGHT: 250 LBS | RESPIRATION RATE: 16 BRPM | SYSTOLIC BLOOD PRESSURE: 97 MMHG | HEART RATE: 73 BPM | DIASTOLIC BLOOD PRESSURE: 52 MMHG | TEMPERATURE: 97.7 F

## 2022-09-25 DIAGNOSIS — M25.561 ACUTE PAIN OF RIGHT KNEE: ICD-10-CM

## 2022-09-25 LAB
HOLD SPECIMEN: NORMAL

## 2022-09-25 PROCEDURE — 99284 EMERGENCY DEPT VISIT MOD MDM: CPT | Performed by: EMERGENCY MEDICINE

## 2022-09-25 PROCEDURE — 250N000013 HC RX MED GY IP 250 OP 250 PS 637: Performed by: EMERGENCY MEDICINE

## 2022-09-25 PROCEDURE — 250N000012 HC RX MED GY IP 250 OP 636 PS 637: Performed by: EMERGENCY MEDICINE

## 2022-09-25 RX ORDER — MORPHINE SULFATE 15 MG/1
7.5-15 TABLET ORAL EVERY 4 HOURS PRN
Qty: 6 TABLET | Refills: 0 | Status: SHIPPED | OUTPATIENT
Start: 2022-09-25 | End: 2023-07-21

## 2022-09-25 RX ORDER — PREDNISONE 10 MG/1
TABLET ORAL
Qty: 32 TABLET | Refills: 0 | Status: SHIPPED | OUTPATIENT
Start: 2022-09-25 | End: 2022-10-05

## 2022-09-25 RX ORDER — PREDNISONE 20 MG/1
40 TABLET ORAL ONCE
Status: COMPLETED | OUTPATIENT
Start: 2022-09-25 | End: 2022-09-25

## 2022-09-25 RX ORDER — MORPHINE SULFATE 15 MG/1
15 TABLET ORAL ONCE
Status: COMPLETED | OUTPATIENT
Start: 2022-09-25 | End: 2022-09-25

## 2022-09-25 RX ADMIN — MORPHINE SULFATE 15 MG: 15 TABLET ORAL at 03:11

## 2022-09-25 RX ADMIN — PREDNISONE 40 MG: 20 TABLET ORAL at 03:12

## 2022-09-25 ASSESSMENT — ENCOUNTER SYMPTOMS
JOINT SWELLING: 1
FATIGUE: 0
SHORTNESS OF BREATH: 0
CHILLS: 0
ARTHRALGIAS: 1
FEVER: 0
COLOR CHANGE: 0
CARDIOVASCULAR NEGATIVE: 1
NUMBNESS: 0
WEAKNESS: 0
BACK PAIN: 0
APPETITE CHANGE: 0

## 2022-09-25 ASSESSMENT — ACTIVITIES OF DAILY LIVING (ADL): ADLS_ACUITY_SCORE: 35

## 2022-09-25 NOTE — ED PROVIDER NOTES
History     Chief Complaint   Patient presents with     Knee Pain     Pain to right knee for a few days- worse tonight. Patient took tylenol and MS contin for pain.      HPI  Reilly Borja is a 72 year old male with history of gout the past presenting for evaluation of roughly 3 days of right knee pain which is progressively worsened.  He reports aching pain throughout his anterior knee.  Pain worse with movement.  No reported trauma.  Does report some swelling in the area.  Reminds him of previous flares of gout although the last flare was many years ago.  Not currently on any medications for gout.  Denies fevers or chills.  Denies any radiation or migration of pain.  Tried a dose of Tylenol and MS Contin (another person's prescription) without relief of pain.  Patient reports he did get relief with colchicine in the past but is concerned about taking this due to his current heart condition with low ejection fraction    ==================================================================    CHART REVIEW:      PET Myocardial Perfusion study 5/20/22:  Left ventricular cavity is dilated on the rest ammonia study. The  gated PET imaging demonstrates abnormal wall motion of the left  ventricle, with global hypokinesia. Superimposed are greater foci of  decreased wall motion, including akinesia of the [id number]  inferoseptal segments, and greater hypokinesia of the [id number]  anteroseptum and true apex. Left ventricular ejection fraction is 29%.  Left ventricular end-diastolic volume is 198 mL. End-systolic volume  is 141 mL.    END CHART REVIEW  ==================================================================      Allergies:  Allergies   Allergen Reactions     Simvastatin Muscle Pain (Myalgia)       Problem List:    Patient Active Problem List    Diagnosis Date Noted     Charcot's joint of left foot 09/17/2021     Priority: Medium     Status post debridement 09/17/2021     Priority: Medium     Iron  deficiency anemia, unspecified 09/16/2021     Priority: Medium     Vitreous degeneration of right eye 09/16/2021     Priority: Medium     Hx of gout 09/16/2021     Priority: Medium     Pain in left ankle 09/16/2021     Priority: Medium     Personal history of colonic polyps 09/16/2021     Priority: Medium     Astigmatism 09/16/2021     Priority: Medium     Blepharochalasis 09/16/2021     Priority: Medium     Cataract 09/16/2021     Priority: Medium     Nonalcoholic fatty liver disease 09/16/2021     Priority: Medium     Overweight 09/16/2021     Priority: Medium     Presbyopia 09/16/2021     Priority: Medium     Pure hypercholesterolemia 09/16/2021     Priority: Medium     Chronic heart failure, unspecified heart failure type (H) 06/03/2021     Priority: Medium     Essential hypertension 11/16/2019     Priority: Medium     Type 2 diabetes mellitus with complication, with long-term current use of insulin (H) 11/16/2019     Priority: Medium     S/P spinal surgery 11/16/2019     Priority: Medium     Lumbar radiculopathy 02/06/2017     Priority: Medium        Past Medical History:    Past Medical History:   Diagnosis Date     Congestive heart failure (H)      Diabetes (H)      Hypertension      Iron deficiency anemia secondary to inadequate dietary iron intake        Past Surgical History:    Past Surgical History:   Procedure Laterality Date     ARTHROSCOPY KNEE Left      IRRIGATION AND DEBRIDEMENT LOWER EXTREMITY, COMBINED Left 9/17/2021    Procedure: IRRIGATION AND DEBRIDEMENT  WITH BONE BIOPSY LEFT FOOT;  Surgeon: Julius Campbell DO;  Location: [facility] OR     OTHER SURGICAL HISTORY Left     surgery for charcot of left foot     REMOVE HARDWARE LOWER EXTREMITY Left 9/17/2021    Procedure: REMOVAL OF HARDWARE LEFT FOOT DEEP;  Surgeon: Julius Campbell DO;  Location: [facility] OR     [facility] LUMBAR SPINE FUSN,POST INTRBDY Right 02/06/2017    Procedure: RIGHT L5-S1 TRANSFORAMINAL LUMBAR  "INTERBODY FUSION;  Surgeon: Rajiv Zavala MD;  Location: [facility] Main OR;  Service: Spine       Family History:    Family History   Problem Relation Age of Onset     Diabetes Mother        Social History:  Marital Status:  Single [1]  Social History     Tobacco Use     Smoking status: Never Smoker     Smokeless tobacco: Never Used   Vaping Use     Vaping Use: Never used   Substance Use Topics     Alcohol use: Not Currently     Drug use: Never        Medications:    morphine (MSIR) 15 MG IR tablet  predniSONE (DELTASONE) 10 MG tablet  acetaminophen (TYLENOL) 500 MG tablet  aspirin (ASA) 81 MG EC tablet  carvedilol (COREG) 12.5 MG tablet  empagliflozin (JARDIANCE) 10 MG TABS tablet  glucose-vitamin C 4-6 GM-MG CHEW  insulin glargine (LANTUS VIAL) 100 UNIT/ML vial  losartan (COZAAR) 50 MG tablet  metFORMIN (GLUCOPHAGE) 1000 MG tablet  multivitamin (THERMEMS) TABS  sacubitril-valsartan (ENTRESTO) 24-26 MG per tablet  Semaglutide,0.25 or 0.5MG/DOS, 2 MG/1.5ML SOPN  spironolactone (ALDACTONE) 25 MG tablet  torsemide (DEMADEX) 20 MG tablet          Review of Systems   Constitutional: Negative for appetite change, chills, fatigue and fever.   Respiratory: Negative for shortness of breath.    Cardiovascular: Negative.  Negative for chest pain and leg swelling.   Musculoskeletal: Positive for arthralgias (right knee pain and swelling) and joint swelling. Negative for back pain.   Skin: Negative for color change and rash.   Neurological: Negative for weakness and numbness.   All other systems reviewed and are negative.      Physical Exam   BP: 99/65  Pulse: 83  Temp: 97.7  F (36.5  C)  Resp: 16  Height: 188 cm (6' 2\")  Weight: 113.4 kg (250 lb)  SpO2: 94 %      Physical Exam  Vitals and nursing note reviewed.   Constitutional:       Appearance: Normal appearance. He is not ill-appearing or diaphoretic.   HENT:      Head: Atraumatic.   Cardiovascular:      Rate and Rhythm: Normal rate.      Pulses: Normal pulses. "   Pulmonary:      Effort: Pulmonary effort is normal.   Musculoskeletal:      Right knee: Swelling and effusion present. No erythema, ecchymosis, bony tenderness or crepitus. Decreased range of motion. Tenderness (anterior) present.      Instability Tests: Anterior drawer test negative. Posterior drawer test negative.      Right lower leg: No edema.      Left lower leg: No edema.   Skin:     General: Skin is warm and dry.      Capillary Refill: Capillary refill takes less than 2 seconds.   Neurological:      Mental Status: He is alert and oriented to person, place, and time.   Psychiatric:         Mood and Affect: Mood normal.         ED Course                 Procedures                  Results for orders placed or performed during the hospital encounter of 09/25/22 (from the past 24 hour(s))   [facility] Draw    Narrative    The following orders were created for panel order [facility] Draw.  Procedure                               Abnormality         Status                     ---------                               -----------         ------                     Extra Red Top Tube[[id number]]                               In process                 Extra Green Top (Lithium...[[id number]]                      In process                 Extra Purple Top Tube[[id number]]                            In process                   Please view results for these tests on the individual orders.       Medications   predniSONE (DELTASONE) tablet 40 mg (has no administration in time range)   morphine (MSIR) IR tablet 15 mg (has no administration in time range)       Assessments & Plan (with Medical Decision Making)  72-year-old male presenting for evaluation of pain and swelling in his right knee.  Has history of gout.  Has visible and palpable effusion of the right knee.  Knee is slightly warm and diffusely tender.  Has reduced range of motion due to pain.  No significant erythema or evidence of cellulitis or obvious infection.   Patient reports symptoms remind him of previous episode of gout.  We had a long discussion about treatment options.  He is a diabetic with known history of heart disease.  Also has surgery expected for his left foot in the next several weeks.  I offered aspiration of the joint to allow for immediate relief of pressure as well as confirmation of diagnosis.  Patient declined.  Patient is concerned about infection given his diabetic status and anticipated upcoming surgery.  Discussed treatment options including colchicine versus another NSAID versus prednisone.  Ultimately after much discussion we decided to proceed with prednisone acknowledging that it will increase his blood sugars and may delay his surgery as I do not anticipate his surgeon will be feeling comfortable proceeding to surgery until after he is off prednisone for at least a week or more.  This however will likely give him good relief of pain and have lesser effect on his cardiac status.  Patient advised that if symptoms worsen or new or concerning symptoms develop, to return to the ED for repeat evaluation.  We will treat with a tapering course of prednisone with initial short course of oral morphine for pain relief.     I have reviewed the nursing notes.    I have reviewed the findings, diagnosis, plan and need for follow up with the patient.       New Prescriptions    MORPHINE (MSIR) 15 MG IR TABLET    Take 0.5-1 tablets (7.5-15 mg) by mouth every 4 hours as needed for severe pain (7-10)    PREDNISONE (DELTASONE) 10 MG TABLET    Take 4 tablets daily for 5 days,  take 2 tablets daily for 3 days, take 1 tablet daily for 3 days, take half a tablet for 3 days.       Final diagnoses:   Acute pain of right knee - Likely gout       9/25/2022   [facility] EMERGENCY DEPT     Parker, Reynaldo Messina MD  09/25/22 0254

## 2022-09-25 NOTE — ED TRIAGE NOTES
Patient presents for eval of right knee pain starting a few days ago. Pain has been increasing. It is described as sharp. He has taken tylenol and MS contin (not his prescription) for this.      Triage Assessment     Row Name 09/25/22 0117       Triage Assessment (Adult)    Airway WDL WDL       Respiratory WDL    Respiratory WDL WDL       Cognitive/Neuro/Behavioral WDL    Cognitive/Neuro/Behavioral WDL WDL

## 2022-09-25 NOTE — DISCHARGE INSTRUCTIONS
Be aware that starting prednisone well likely increase your blood sugars so long as you are on this prescription.  Monitor your sugars more closely and you may need additional insulin to manage your blood sugars.    If your knee symptoms get worse, please return to the emergency department for a repeat evaluation

## 2022-09-27 ENCOUNTER — ANCILLARY PROCEDURE (OUTPATIENT)
Dept: GENERAL RADIOLOGY | Facility: CLINIC | Age: 72
End: 2022-09-27
Attending: ORTHOPAEDIC SURGERY
Payer: COMMERCIAL

## 2022-09-27 DIAGNOSIS — M25.572 PAIN IN JOINT, ANKLE AND FOOT, LEFT: ICD-10-CM

## 2022-09-27 PROCEDURE — 20605 DRAIN/INJ JOINT/BURSA W/O US: CPT | Mod: LT | Performed by: RADIOLOGY

## 2022-09-27 PROCEDURE — 77002 NEEDLE LOCALIZATION BY XRAY: CPT | Performed by: RADIOLOGY

## 2022-09-27 RX ORDER — IOPAMIDOL 408 MG/ML
10 INJECTION, SOLUTION INTRATHECAL ONCE
Status: COMPLETED | OUTPATIENT
Start: 2022-09-27 | End: 2022-09-27

## 2022-09-27 RX ORDER — LIDOCAINE HYDROCHLORIDE 10 MG/ML
30 INJECTION, SOLUTION EPIDURAL; INFILTRATION; INTRACAUDAL; PERINEURAL ONCE
Status: COMPLETED | OUTPATIENT
Start: 2022-09-27 | End: 2022-09-27

## 2022-09-27 RX ORDER — BUPIVACAINE HYDROCHLORIDE 2.5 MG/ML
25 INJECTION, SOLUTION EPIDURAL; INFILTRATION; INTRACAUDAL ONCE
Status: COMPLETED | OUTPATIENT
Start: 2022-09-27 | End: 2022-09-27

## 2022-09-27 RX ADMIN — LIDOCAINE HYDROCHLORIDE 5 ML: 10 INJECTION, SOLUTION EPIDURAL; INFILTRATION; INTRACAUDAL; PERINEURAL at 10:59

## 2022-09-27 RX ADMIN — BUPIVACAINE HYDROCHLORIDE 10 MG: 2.5 INJECTION, SOLUTION EPIDURAL; INFILTRATION; INTRACAUDAL at 10:59

## 2022-09-27 RX ADMIN — IOPAMIDOL 2 ML: 408 INJECTION, SOLUTION INTRATHECAL at 10:59

## 2022-10-11 ENCOUNTER — OFFICE VISIT (OUTPATIENT)
Dept: ORTHOPEDICS | Facility: CLINIC | Age: 72
End: 2022-10-11
Payer: COMMERCIAL

## 2022-10-11 VITALS — HEIGHT: 74 IN | BODY MASS INDEX: 31.83 KG/M2 | WEIGHT: 248 LBS

## 2022-10-11 DIAGNOSIS — M25.572 PAIN IN JOINT, ANKLE AND FOOT, LEFT: Primary | ICD-10-CM

## 2022-10-11 PROCEDURE — 99213 OFFICE O/P EST LOW 20 MIN: CPT | Performed by: ORTHOPAEDIC SURGERY

## 2022-10-11 NOTE — PROGRESS NOTES
CHIEF COMPLAINT:  Status post left Charcot arthropathy.    HISTORY OF PRESENT ILLNESS:  Mr. Borja presents today for further evaluation after undergoing a left talonavicular and calcaneocuboid joint injection under fluoroscopic control.    The patient reports to have had very satisfactory results with the injection of both joints.    PHYSICAL EXAMINATION:  No physical exam was performed today.    ASSESSMENT:  Left talonavicular and calcaneocuboid joint arthritis secondary to Charcot arthropathy.    PLAN:  Discussed with the patient that at this point, the recommendation will be to undergo a left talonavicular and calcaneocuboid joint arthrodesis with proximal tibial bone graft harvesting.    I discussed with the patient the most likely postoperative course and complications from undergoing such intervention, which include but are not limited to infection, bleeding, nerve damage, residual pain, nonunion and stiffness.    All questions were answered.  Patient was pleased with the discussion.  The patient will proceed with the surgery at the best of his convenience.  In the meantime, he has no activity restrictions.  All questions were answered.    TT:  20 minutes.  CT:  15 minutes.    Mr. Borja' last A1c was 8.8 on 08/30/2022.  The patient will require to bring his A1c below 7.5 before pursuing surgery.

## 2022-10-11 NOTE — NURSING NOTE
"Reason For Visit:   Chief Complaint   Patient presents with     RECHECK     Return to discuss surgery for left foot Charcot arthropathy, injection 09/27/22. Pt states that the injection delayed the onset of the pain to later in the day and only lasted for about a day. Xray and CT 08/30/22.       Ht 1.88 m (6' 2\")   Wt 112.5 kg (248 lb)   BMI 31.84 kg/m      Pain Assessment  Patient Currently in Pain: Denies (Denies pain at time of evaluation)    Ash Le, EMT    " From: Chantelle Sands  To: Sheila Matute  Sent: 1/14/2021 12:11 PM CST  Subject: Non-Urgent Medical Question    Thank you so much for the quick response! I am off of work tomorrow and can head to a ACL in the morning for that test ! Do you recommend any specific location? I will also look up those yoga videos after work today!     Thank you again!  Chantelle Sands

## 2022-10-11 NOTE — LETTER
10/11/2022         RE: Reilly Borja  [address] [address]        Dear Colleague,    Thank you for referring your patient, Reilly Borja, to the [facility] [place]. Please see a copy of my visit note below.    CHIEF COMPLAINT:  Status post left Charcot arthropathy.    HISTORY OF PRESENT ILLNESS:  Mr. Borja presents today for further evaluation after undergoing a left talonavicular and calcaneocuboid joint injection under fluoroscopic control.    The patient reports to have had very satisfactory results with the injection of both joints.    PHYSICAL EXAMINATION:  No physical exam was performed today.    ASSESSMENT:  Left talonavicular and calcaneocuboid joint arthritis secondary to Charcot arthropathy.    PLAN:  Discussed with the patient that at this point, the recommendation will be to undergo a left talonavicular and calcaneocuboid joint arthrodesis with proximal tibial bone graft harvesting.    I discussed with the patient the most likely postoperative course and complications from undergoing such intervention, which include but are not limited to infection, bleeding, nerve damage, residual pain, nonunion and stiffness.    All questions were answered.  Patient was pleased with the discussion.  The patient will proceed with the surgery at the best of his convenience.  In the meantime, he has no activity restrictions.  All questions were answered.    TT:  20 minutes.  CT:  15 minutes.    Mr. Borja' last A1c was 8.8 on 08/30/2022.  The patient will require to bring his A1c below 7.5 before pursuing surgery.          Again, thank you for allowing me to participate in the care of your patient.        Sincerely,        Orlando Duarte MD

## 2022-10-11 NOTE — LETTER
Date:October 13, 2022      Patient was self referred, no letter generated. Do not send.        [facility] Health Information

## 2022-10-11 NOTE — NURSING NOTE
Teaching Flowsheet   Relevant Diagnosis: foot   Teaching Topic: preop     Person(s) involved in teaching:   Patient     Motivation Level:  Asks Questions: Yes  Eager to Learn: Yes  Cooperative: Yes  Receptive (willing/able to accept information): Yes  Any cultural factors/Yazdanism beliefs that may influence understanding or compliance? No       Patient demonstrates understanding of the following:  Reason for the appointment, diagnosis and treatment plan: Yes  Knowledge of proper use of medications and conditions for which they are ordered (with special attention to potential side effects or drug interactions): Yes  Which situations necessitate calling provider and whom to contact: Yes       Teaching Concerns Addressed: [facility] pt.  Pt aware  stated do not schedule surgery date until A1C 7.0 or less.  Informed surgery scheduler.         Proper use and care of meds. (medical equip, care aids, etc.): Yes  Nutritional needs and diet plan: Yes  Pain management techniques: Yes  Wound Care: Yes  How and/when to access community resources: Yes     Instructional Materials Used/Given: preop pkt     Time spent with patient: 15 minutes.

## 2022-10-12 ENCOUNTER — TELEPHONE (OUTPATIENT)
Dept: ORTHOPEDICS | Facility: CLINIC | Age: 72
End: 2022-10-12

## 2022-10-12 NOTE — TELEPHONE ENCOUNTER
Called and spoke with pt to inform that Dr. Duarte is wanting him to lower his A1C to below 7.5 before moving fwd with surgery. Pt stated he's already gotten it down and wants to know when he can proceed with surgery. Stated if he can come here to have his levels checked ASAP, we could have the records right away so he can move fwd with surgery. Informed pt writer would send msg to RN and have someone give him a call back to discuss his A1C levels. Pt understood and agreed.    Nicki Lake on 10/12/2022 at 10:18 AM

## 2022-10-14 ENCOUNTER — TELEPHONE (OUTPATIENT)
Dept: ORTHOPEDICS | Facility: CLINIC | Age: 72
End: 2022-10-14

## 2022-10-14 NOTE — TELEPHONE ENCOUNTER
Hello,  I'm with ortho con.  Pt saw Dr. Duarte and has been calling  to reach surgery schedulers all week.  Pt says he is ready to give up.  He is not getting a reply and wants to deal with his foot.  Can you help?  Thank you.

## 2022-10-14 NOTE — TELEPHONE ENCOUNTER
I called and spoke with Reilly to assure him that the surgery schedulers will be reaching out soon. (And he actually received a call yesterday from them.) But in the mean time, he is going to get his blood drawn for a updated A1c reading, as surgery cannot be scheduled until we see that his A1c is under 7.5.. I have placed the order and he plans to go to the [facility] for this. He thanked me and had no further questions.  Mell Cobian ATC

## 2022-10-18 DIAGNOSIS — M14.672 CHARCOT'S JOINT OF LEFT FOOT: Primary | ICD-10-CM

## 2022-10-18 DIAGNOSIS — E08.59 DIABETES MELLITUS DUE TO UNDERLYING CONDITION WITH OTHER CIRCULATORY COMPLICATIONS (H): ICD-10-CM

## 2022-10-19 ENCOUNTER — TELEPHONE (OUTPATIENT)
Dept: ORTHOPEDICS | Facility: CLINIC | Age: 72
End: 2022-10-19

## 2022-10-19 NOTE — TELEPHONE ENCOUNTER
"Reilly called our paperwork coordinator Arcelia Aguilar hoping to get answers in regards to next steps and surgery scheduling, claiming that he hasn't spoken to anyone since seeing Dr. Duarte. He was transferred to me and I explained to him that we spoke last Friday and the plan was for him to get his blood drawn for a hemoglobin A1c reading before we can schedule surgery. That number needs to be below 7.5 to move forward. He was confused, reading the pre op requirements, and thought that was the lab work needing to be done within 30 days prior to surgery so he was calling to get a surgery date so that he could get his blood work done in an appropriate time frame. Once I cleared up that that was different lab work, he went on to be confused again as to why he needed his A1c checked, his impression was that the last reading was 6.something recently. I explained that the last reading we have in our system was 8.8 from last month. He then became sad and sounded defeated saying things like \"he doesn't know what to do\" and \"just wants to give up, he's in so much pain.\" I tried to encourage him and tell him that he knows what to do, we have a plan, go get his blood drawn and hopefully we'll be able to schedule his surgery soon and get him feeling better. In the midst of my sentence Reilly abruptly ended the conversation by hanging up.  Mell Cobian   "

## 2022-10-25 ENCOUNTER — LAB (OUTPATIENT)
Dept: LAB | Facility: CLINIC | Age: 72
End: 2022-10-25
Payer: COMMERCIAL

## 2022-10-25 DIAGNOSIS — M14.672 CHARCOT'S JOINT OF LEFT FOOT: ICD-10-CM

## 2022-10-25 DIAGNOSIS — E08.59 DIABETES MELLITUS DUE TO UNDERLYING CONDITION WITH OTHER CIRCULATORY COMPLICATIONS (H): ICD-10-CM

## 2022-10-25 LAB — HBA1C MFR BLD: 9.2 % (ref 0–5.6)

## 2022-10-25 PROCEDURE — 83036 HEMOGLOBIN GLYCOSYLATED A1C: CPT

## 2022-10-25 PROCEDURE — 36415 COLL VENOUS BLD VENIPUNCTURE: CPT

## 2022-10-27 NOTE — TELEPHONE ENCOUNTER
[facility] Call Center    Phone Message    May a detailed message be left on voicemail: yes     Reason for Call: Other: call back for A1c test results      Action Taken: Message routed to:  [facility] ortho    Travel Screening: Not Applicable     Patient wants results and to schedule surgery based off those results

## 2022-10-31 ENCOUNTER — TELEPHONE (OUTPATIENT)
Dept: ORTHOPEDICS | Facility: CLINIC | Age: 72
End: 2022-10-31

## 2022-10-31 NOTE — TELEPHONE ENCOUNTER
"I spoke with Reilly and let him know that we got the results of his latest A1c draw, 9.2, and that it is still too high to schedule surgery. And that he needs to follow up with his endocrinologist when he sees them in January. Reilly expressed how frustrated he is that he has been to 5 different doctors over the past 3 years, and no one will help him. He says he has had diabetes for 20 years and \"eats hardly anything\", doesn't understand why it's not under control. He confirmed that he does take his diabetes medicine and [facility]. He then told me he knows it's not my fault but just wants his foot cut off or there's nothing worth living for.  I assured him that we only want to help and want him to keep his foot, that's why no one has been willing to do surgery for him. And that I will discuss this with Dr. Duarte tomorrow to see if we can do anything for him. At most I am going to see what resources we can provide Reilly for his depression and suicidal comments. He ultimately thanked me for my call and said to have a good night.  Mell Cobian ATC  "

## 2022-11-03 DIAGNOSIS — R45.89 HOPELESSNESS: ICD-10-CM

## 2022-11-03 DIAGNOSIS — E08.59 DIABETES MELLITUS DUE TO UNDERLYING CONDITION WITH OTHER CIRCULATORY COMPLICATIONS (H): Primary | ICD-10-CM

## 2022-11-03 DIAGNOSIS — E11.8 TYPE 2 DIABETES MELLITUS WITH COMPLICATION, WITH LONG-TERM CURRENT USE OF INSULIN (H): ICD-10-CM

## 2022-11-03 DIAGNOSIS — Z79.4 TYPE 2 DIABETES MELLITUS WITH COMPLICATION, WITH LONG-TERM CURRENT USE OF INSULIN (H): ICD-10-CM

## 2022-11-03 NOTE — PLAN OF CARE
Patient vital signs are at baseline: Yes  Patient able to ambulate as they were prior to admission or with assist devices provided by therapies during their stay:  No,  Reason: Pt needing physical therapy today  Patient MUST void prior to discharge:  Yes  Patient able to tolerate oral intake:  Yes  Pain has adequate pain control using Oral analgesics:  Yes, surgical block still intact.     Pain block still intact with pt unable to feel or move his toes still. Good cap refill. Pt unhappy about his blood glucose numbers so far while here in the hospital. Will have day hospitalist speak with pt about his medications.      18

## 2023-03-10 ENCOUNTER — TELEPHONE (OUTPATIENT)
Dept: ORTHOPEDICS | Facility: CLINIC | Age: 73
End: 2023-03-10

## 2023-03-10 NOTE — TELEPHONE ENCOUNTER
Called and spoke to patient. Patient states his A1c is down to 8 and he would like to proceed with surgery. Per the last office note it looks like his goal was 7.5 before surgery. He also wants to discuss extending a referral to be seen here. I let the patient know Dr. Duarte's nurse would give him a call back about the referral and possibly setting up another appointment with Dr. Duarte.    KOFI Herndon

## 2023-03-10 NOTE — TELEPHONE ENCOUNTER
Called and LVM for patient. Relayed last message about A1c from last October. Patient had cancelled his endocrinology appointment and has not had a recent A1c check. Callback number left.    KOFI Herndon   PATIENT INSTRUCTIONS    Treatment:  Ice: Apply ice for 20 minutes 2-3 times a day. No barrier between the ice and skin is needed when using cubes or frozen peas. If you are using a chemical ice pack please place a barrier between the ice pack and your skin.      Rest: No impact for 4 weeks  Compression: isotoner compression glove to come up to mid forearm.     Follow-Up:  Call back in 4 weeks if pain persists will order MRI at that time.   Call or return to the clinic as needed if these symptoms worsen fail to improve as anticipated, or if new symptoms develop.            Time left: 10/28/2019 2:53 PM        Please note: 24 hour notice for cancellation of appointment is required.    You may receive a survey in the mail, or via the e-mail address that you have provided.  We would appreciate if you could fill out the survey and provide us with any feedback on your experience regarding your visit today. Thank you for allowing us to provide you with your health care needs.     Do not hesitate to call if you are experiencing severe pain, worsening or change in your pain, have symptoms of infection (fever, warmth, redness, increased drainage), or have any other problem that concerns you ~ [phone] (or [phone] after hours).    Please remember when requesting refills on pain medication that the request should be made by Thursday at the latest. [facility] is open Monday-Friday, 8am-5pm, and closed on the weekends.  No narcotic refills will be filled after hours.    Additional Educational Resources:  For additional resources regarding your symptoms, diagnosis, or further health information, please visit the Health Resources section on [email or web address] or the Online Health Resources section in [place].

## 2023-03-10 NOTE — TELEPHONE ENCOUNTER
[facility] Call Center    Phone Message    May a detailed message be left on voicemail: yes     Reason for Call: Other: Reilly called back returning Roberta call. Please call     Action Taken: Other: [facility]    Travel Screening: Not Applicable

## 2023-03-10 NOTE — TELEPHONE ENCOUNTER
[facility] Call [facility]    Phone Message    May a detailed message be left on voicemail: yes     Reason for Call Patient is looking for Call back from Doctor about Surgery   Action Taken: Message routed to:  [facility] katty    Travel Screening: Not Applicable

## 2023-03-13 NOTE — TELEPHONE ENCOUNTER
Called patient regarding wanting to schedule surgery on his left foot. Per Gerald's clinic note from Sept 2022, unable to schedule surgery until hemoglobin A1c is below 7.5. [facility] but does not remember the name of his endocrinologist since it changes. He had his hemoglobin A1c drawn last week and it was 8. Patient frustrated he can not schedule surgery since the pain in his foot is getting worse. Offered an appointment with Dr. Duarte since he has not seen Dr. Duarte since September 2022. Appointment scheduled for March 21st at 1:00 PM.     Tara Holter, RNCC

## 2023-03-21 ENCOUNTER — OFFICE VISIT (OUTPATIENT)
Dept: ORTHOPEDICS | Facility: CLINIC | Age: 73
End: 2023-03-21
Payer: MEDICARE

## 2023-03-21 ENCOUNTER — ANCILLARY PROCEDURE (OUTPATIENT)
Dept: GENERAL RADIOLOGY | Facility: CLINIC | Age: 73
End: 2023-03-21
Attending: ORTHOPAEDIC SURGERY
Payer: MEDICARE

## 2023-03-21 DIAGNOSIS — M79.672 PAIN IN LEFT FOOT: ICD-10-CM

## 2023-03-21 DIAGNOSIS — Z79.4 TYPE 2 DIABETES MELLITUS WITH COMPLICATION, WITH LONG-TERM CURRENT USE OF INSULIN (H): Primary | ICD-10-CM

## 2023-03-21 DIAGNOSIS — E11.8 TYPE 2 DIABETES MELLITUS WITH COMPLICATION, WITH LONG-TERM CURRENT USE OF INSULIN (H): Primary | ICD-10-CM

## 2023-03-21 PROCEDURE — 99213 OFFICE O/P EST LOW 20 MIN: CPT | Performed by: ORTHOPAEDIC SURGERY

## 2023-03-21 PROCEDURE — 73630 X-RAY EXAM OF FOOT: CPT | Mod: LT | Performed by: RADIOLOGY

## 2023-03-21 NOTE — LETTER
Date:March 22, 2023      Provider requested that no letter be sent. Do not send.       [facility]

## 2023-03-21 NOTE — NURSING NOTE
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 Wants to discuss surgery.  [facility]       There were no vitals taken for this visit.         Mell Cobian, ATC

## 2023-03-21 NOTE — LETTER
3/21/2023         RE: Reilly Borja  [address] [address]        Dear Colleague,    Thank you for referring your patient, Reilly Borja, to the [facility] [place]. Please see a copy of my visit note below.    CHIEF COMPLAINT:  Status post left foot Charcot arthropathy.    HISTORY OF PRESENT ILLNESS:  Mr. Borja presents today for further followup.  Reports to be improving with his A1c.  Reports to have had the best weight he has had in the past 40 years.  Unfortunately, his A1c 02/24/2023 was still 8.1 and we would like it to be below 7.5.    Reports to have some pain and discomfort he pushes through the foot.    No physical exam was performed today.    We will obtain plain x-rays, which are not available at time of dictation.    ASSESSMENT:  Status post left foot Charcot arthropathy.    PLAN:  I discussed with the patient that we really need an A1c below 7.5 in order to avoid complications.  Once that takes place and we are hoping to have a new  draw on 04/01 or slightly later than that date, we will proceed with the surgery at the best of his convenience.    The patient will require a phone encounter to discuss the postoperative course from the surgery, which will consist of a talonavicular and calcaneocuboid joint fusions.    All questions were answered.    TT:  20 minutes.          Again, thank you for allowing me to participate in the care of your patient.        Sincerely,        Orlando Duarte MD

## 2023-03-21 NOTE — PROGRESS NOTES
CHIEF COMPLAINT:  Status post left foot Charcot arthropathy.    HISTORY OF PRESENT ILLNESS:  Mr. Borja presents today for further followup.  Reports to be improving with his A1c.  Reports to have had the best weight he has had in the past 40 years.  Unfortunately, his A1c 02/24/2023 was still 8.1 and we would like it to be below 7.5.    Reports to have some pain and discomfort he pushes through the foot.    No physical exam was performed today.    We will obtain plain x-rays, which are not available at time of dictation.    ASSESSMENT:  Status post left foot Charcot arthropathy.    PLAN:  I discussed with the patient that we really need an A1c below 7.5 in order to avoid complications.  Once that takes place and we are hoping to have a new  draw on 04/01 or slightly later than that date, we will proceed with the surgery at the best of his convenience.    The patient will require a phone encounter to discuss the postoperative course from the surgery, which will consist of a talonavicular and calcaneocuboid joint fusions.    All questions were answered.    TT:  20 minutes.

## 2023-04-03 ENCOUNTER — LAB (OUTPATIENT)
Dept: LAB | Facility: CLINIC | Age: 73
End: 2023-04-03
Payer: MEDICARE

## 2023-04-03 DIAGNOSIS — E11.8 TYPE 2 DIABETES MELLITUS WITH COMPLICATION, WITH LONG-TERM CURRENT USE OF INSULIN (H): ICD-10-CM

## 2023-04-03 DIAGNOSIS — Z79.4 TYPE 2 DIABETES MELLITUS WITH COMPLICATION, WITH LONG-TERM CURRENT USE OF INSULIN (H): ICD-10-CM

## 2023-04-03 LAB — HBA1C MFR BLD: 8.4 %

## 2023-04-03 PROCEDURE — 36415 COLL VENOUS BLD VENIPUNCTURE: CPT | Performed by: PATHOLOGY

## 2023-04-03 PROCEDURE — 83036 HEMOGLOBIN GLYCOSYLATED A1C: CPT | Performed by: ORTHOPAEDIC SURGERY

## 2023-04-23 ENCOUNTER — HEALTH MAINTENANCE LETTER (OUTPATIENT)
Age: 73
End: 2023-04-23

## 2023-05-01 ENCOUNTER — TRANSFERRED RECORDS (OUTPATIENT)
Dept: HEALTH INFORMATION MANAGEMENT | Facility: CLINIC | Age: 73
End: 2023-05-01

## 2023-06-08 ENCOUNTER — MEDICAL CORRESPONDENCE (OUTPATIENT)
Dept: HEALTH INFORMATION MANAGEMENT | Facility: CLINIC | Age: 73
End: 2023-06-08

## 2023-07-03 ENCOUNTER — TELEPHONE (OUTPATIENT)
Dept: ORTHOPEDICS | Facility: CLINIC | Age: 73
End: 2023-07-03

## 2023-07-03 NOTE — TELEPHONE ENCOUNTER
Informed patient that Dr. Duarte's nurse will reach out to him on Wednesday and let him know if this is ok. I let him know that likely he can schedule surgery for a few weeks out and retest just prior to proceed with surgery but we will check with Dr. Duarte and let him know. He was more than fine with this, he just wanted to call right away as he was excited with his current number.  Mell Cobian ATC

## 2023-07-03 NOTE — TELEPHONE ENCOUNTER
[facility] Call Center    Phone Message    May a detailed message be left on voicemail: yes     Reason for Call: Other: As of 6/22/23, patient's A1C was down to 7.7 and he has another test in 2 weeks.     Patient was hoping this was close enough to Dr. Duarte's guideline of 7.5 so patient can schedule surgery.    Please call patient to discuss.    Action Taken: Message routed to:  [facility] ortho    Travel Screening: Not Applicable

## 2023-07-05 NOTE — TELEPHONE ENCOUNTER
Returned call. Hgb A1c on 6/22/2023 was 7.7. He reports he lost 20 pounds and has been working hard to control his blood sugars. Writer congratulated patient on progress. He expressed frustration when writer explained Dr. Duarte requires Hgb A1c to be less than 7.5 to have surgery. Offered telephone encounter to discuss post operative course and get the ball rolling. Explained he would need another Hgb A1c drawn prior to surgery and it must be less than 7.5 to proceed. Patient verbalized understanding.  Appointment scheduled July 18th at 1:00 PM. Encouraged patient to continue to control blood sugars. Patient will call clinic with any questions.     Tara Holter, RNCC

## 2023-07-06 ENCOUNTER — TRANSCRIBE ORDERS (OUTPATIENT)
Dept: OTHER | Age: 73
End: 2023-07-06

## 2023-07-06 DIAGNOSIS — M79.672 PAIN IN LEFT FOOT: Primary | ICD-10-CM

## 2023-07-18 ENCOUNTER — VIRTUAL VISIT (OUTPATIENT)
Dept: ORTHOPEDICS | Facility: CLINIC | Age: 73
End: 2023-07-18
Payer: COMMERCIAL

## 2023-07-18 DIAGNOSIS — Z79.4 TYPE 2 DIABETES MELLITUS WITH COMPLICATION, WITH LONG-TERM CURRENT USE OF INSULIN (H): Primary | ICD-10-CM

## 2023-07-18 DIAGNOSIS — E11.8 TYPE 2 DIABETES MELLITUS WITH COMPLICATION, WITH LONG-TERM CURRENT USE OF INSULIN (H): Primary | ICD-10-CM

## 2023-07-18 DIAGNOSIS — M79.672 PAIN IN LEFT FOOT: ICD-10-CM

## 2023-07-18 PROCEDURE — 99442 PR PHYSICIAN TELEPHONE EVALUATION 11-20 MIN: CPT | Mod: 95 | Performed by: ORTHOPAEDIC SURGERY

## 2023-07-18 NOTE — PROGRESS NOTES
Status post left foot Charcot arthropathy    Reilly WELCH Cordellks was interviewed via phone.  Patient authorized encounter.    We discussed his most recent hemoglobin A1c of 7.7 which is a month old.  Patient continues having a large amount of pain in his very much interested in undergoing the surgery as he feels to be very miserable and just consider an amputation every night.    I discussed with the patient that I am willing to proceed with another A1c now which is a month later from the best result he has had and if in fact it is below 7.5 we will consider to do the surgery in a week from tomorrow.  The surgery will consist of a left talonavicular and calcaneocuboid joint fusions.    Discussed with the patient the most likely postoperative course and complications from such intervention which included but not limited to infection bleeding nerve damage residual pain and stiffness    All questions were answered.  Patient was per discussion.  He will be contacted with instructions on how to check a new hemoglobin A1c.  In the meantime he has no restrictions.    I spent 17 minutes with the patient on today's interview.

## 2023-07-18 NOTE — LETTER
7/18/2023         RE: Reilly Borja  [address] [address]        Dear Colleague,    Thank you for referring your patient, Reilly Borja, to the [facility] [place]. Please see a copy of my visit note below.    Status post left foot Charcot arthropathy    Reilly Borja was interviewed via phone.  Patient authorized encounter.    We discussed his most recent hemoglobin A1c of 7.7 which is a month old.  Patient continues having a large amount of pain in his very much interested in undergoing the surgery as he feels to be very miserable and just consider an amputation every night.    I discussed with the patient that I am willing to proceed with another A1c now which is a month later from the best result he has had and if in fact it is below 7.5 we will consider to do the surgery in a week from tomorrow.  The surgery will consist of a left talonavicular and calcaneocuboid joint fusions.    Discussed with the patient the most likely postoperative course and complications from such intervention which included but not limited to infection bleeding nerve damage residual pain and stiffness    All questions were answered.  Patient was per discussion.  He will be contacted with instructions on how to check a new hemoglobin A1c.  In the meantime he has no restrictions.    I spent 17 minutes with the patient on today's interview.        Orlando Duarte MD

## 2023-07-19 ENCOUNTER — HOSPITAL ENCOUNTER (OUTPATIENT)
Facility: CLINIC | Age: 73
End: 2023-07-19
Attending: ORTHOPAEDIC SURGERY | Admitting: ORTHOPAEDIC SURGERY
Payer: MEDICARE

## 2023-07-19 ENCOUNTER — TELEPHONE (OUTPATIENT)
Dept: ORTHOPEDICS | Facility: CLINIC | Age: 73
End: 2023-07-19

## 2023-07-19 PROBLEM — M79.672 PAIN IN LEFT FOOT: Status: ACTIVE | Noted: 2023-07-18

## 2023-07-19 NOTE — TELEPHONE ENCOUNTER
Patient is scheduled for surgery with Dr. Duarte    Spoke with: Reilly    Date of Surgery: 8/16/23    Location: [facility]    Informed patient they will need an adult  Yes    Pre op with Provider PAC, 7/21/23 at 1PM    Additional imaging/appointments: POP Made    Surgery packet: Mailed     Additional comments: N/A        Ness Junior on 7/19/2023 at 11:09 AM

## 2023-07-20 RX ORDER — CEFAZOLIN SODIUM 2 G/50ML
2 SOLUTION INTRAVENOUS SEE ADMIN INSTRUCTIONS
Status: CANCELLED | OUTPATIENT
Start: 2023-08-16

## 2023-07-20 RX ORDER — CEFAZOLIN SODIUM 2 G/50ML
2 SOLUTION INTRAVENOUS
Status: CANCELLED | OUTPATIENT
Start: 2023-08-16

## 2023-07-20 NOTE — TELEPHONE ENCOUNTER
FUTURE VISIT INFORMATION      SURGERY INFORMATION:    Date: 23    [facility]: [facility]    Surgeon:  Orlando Duarte MD    Anesthesia Type:  choice    Procedure: left talonavicular and calcanecuboid joint fusion    Consult: virtual visit 23    RECORDS REQUESTED FROM:       Primary Care Provider: [facility]    Most recent EKG+ Tracin21

## 2023-07-21 ENCOUNTER — LAB (OUTPATIENT)
Dept: LAB | Facility: CLINIC | Age: 73
End: 2023-07-21
Payer: MEDICARE

## 2023-07-21 ENCOUNTER — OFFICE VISIT (OUTPATIENT)
Dept: SURGERY | Facility: CLINIC | Age: 73
End: 2023-07-21
Payer: COMMERCIAL

## 2023-07-21 ENCOUNTER — PRE VISIT (OUTPATIENT)
Dept: SURGERY | Facility: CLINIC | Age: 73
End: 2023-07-21

## 2023-07-21 VITALS
RESPIRATION RATE: 16 BRPM | TEMPERATURE: 97.4 F | SYSTOLIC BLOOD PRESSURE: 152 MMHG | BODY MASS INDEX: 29.65 KG/M2 | HEART RATE: 77 BPM | OXYGEN SATURATION: 98 % | DIASTOLIC BLOOD PRESSURE: 80 MMHG | WEIGHT: 231 LBS | HEIGHT: 74 IN

## 2023-07-21 DIAGNOSIS — Z01.818 PREOP EXAMINATION: ICD-10-CM

## 2023-07-21 DIAGNOSIS — E11.8 TYPE 2 DIABETES MELLITUS WITH COMPLICATION, WITH LONG-TERM CURRENT USE OF INSULIN (H): ICD-10-CM

## 2023-07-21 DIAGNOSIS — Z01.818 PREOP EXAMINATION: Primary | ICD-10-CM

## 2023-07-21 DIAGNOSIS — I50.9 CHRONIC HEART FAILURE, UNSPECIFIED HEART FAILURE TYPE (H): ICD-10-CM

## 2023-07-21 DIAGNOSIS — M79.672 PAIN IN LEFT FOOT: ICD-10-CM

## 2023-07-21 DIAGNOSIS — Z79.4 TYPE 2 DIABETES MELLITUS WITH COMPLICATION, WITH LONG-TERM CURRENT USE OF INSULIN (H): ICD-10-CM

## 2023-07-21 LAB
ALBUMIN SERPL BCG-MCNC: 3.7 G/DL (ref 3.5–5.2)
ALP SERPL-CCNC: 222 U/L (ref 40–129)
ALT SERPL W P-5'-P-CCNC: 12 U/L (ref 0–70)
ANION GAP SERPL CALCULATED.3IONS-SCNC: 13 MMOL/L (ref 7–15)
AST SERPL W P-5'-P-CCNC: 21 U/L (ref 0–45)
BILIRUB SERPL-MCNC: 0.3 MG/DL
BUN SERPL-MCNC: 18.8 MG/DL (ref 8–23)
CALCIUM SERPL-MCNC: 9 MG/DL (ref 8.8–10.2)
CHLORIDE SERPL-SCNC: 96 MMOL/L (ref 98–107)
CREAT SERPL-MCNC: 0.98 MG/DL (ref 0.67–1.17)
DEPRECATED HCO3 PLAS-SCNC: 29 MMOL/L (ref 22–29)
ERYTHROCYTE [DISTWIDTH] IN BLOOD BY AUTOMATED COUNT: 14.7 % (ref 10–15)
GFR SERPL CREATININE-BSD FRML MDRD: 82 ML/MIN/1.73M2
GLUCOSE SERPL-MCNC: 222 MG/DL (ref 70–99)
HBA1C MFR BLD: 8 %
HCT VFR BLD AUTO: 41.2 % (ref 40–53)
HGB BLD-MCNC: 12.6 G/DL (ref 13.3–17.7)
MCH RBC QN AUTO: 27 PG (ref 26.5–33)
MCHC RBC AUTO-ENTMCNC: 30.6 G/DL (ref 31.5–36.5)
MCV RBC AUTO: 88 FL (ref 78–100)
PLATELET # BLD AUTO: 363 10E3/UL (ref 150–450)
POTASSIUM SERPL-SCNC: 3.7 MMOL/L (ref 3.4–5.3)
PROT SERPL-MCNC: 8.2 G/DL (ref 6.4–8.3)
RBC # BLD AUTO: 4.67 10E6/UL (ref 4.4–5.9)
SODIUM SERPL-SCNC: 138 MMOL/L (ref 136–145)
WBC # BLD AUTO: 6.9 10E3/UL (ref 4–11)

## 2023-07-21 PROCEDURE — 99204 OFFICE O/P NEW MOD 45 MIN: CPT

## 2023-07-21 PROCEDURE — 36415 COLL VENOUS BLD VENIPUNCTURE: CPT | Performed by: PATHOLOGY

## 2023-07-21 PROCEDURE — 99000 SPECIMEN HANDLING OFFICE-LAB: CPT | Performed by: PATHOLOGY

## 2023-07-21 PROCEDURE — 85027 COMPLETE CBC AUTOMATED: CPT | Performed by: PATHOLOGY

## 2023-07-21 PROCEDURE — 83036 HEMOGLOBIN GLYCOSYLATED A1C: CPT | Performed by: ORTHOPAEDIC SURGERY

## 2023-07-21 PROCEDURE — 80053 COMPREHEN METABOLIC PANEL: CPT | Performed by: PATHOLOGY

## 2023-07-21 RX ORDER — ATORVASTATIN CALCIUM 20 MG/1
20 TABLET, FILM COATED ORAL DAILY
COMMUNITY
End: 2023-09-01

## 2023-07-21 RX ORDER — HYDROCODONE BITARTRATE AND ACETAMINOPHEN 5; 325 MG/1; MG/1
1 TABLET ORAL 2 TIMES DAILY PRN
COMMUNITY
End: 2023-09-01

## 2023-07-21 RX ORDER — SEMAGLUTIDE 1.34 MG/ML
1 INJECTION, SOLUTION SUBCUTANEOUS
COMMUNITY

## 2023-07-21 RX ORDER — CARVEDILOL 25 MG/1
25 TABLET ORAL 2 TIMES DAILY WITH MEALS
COMMUNITY

## 2023-07-21 ASSESSMENT — LIFESTYLE VARIABLES: TOBACCO_USE: 0

## 2023-07-21 ASSESSMENT — COPD QUESTIONNAIRES: COPD: 0

## 2023-07-21 ASSESSMENT — ENCOUNTER SYMPTOMS
SEIZURES: 0
ORTHOPNEA: 0

## 2023-07-21 ASSESSMENT — PAIN SCALES - GENERAL: PAINLEVEL: MILD PAIN (2)

## 2023-07-21 NOTE — PROGRESS NOTES
Preoperative Assessment Center Medication History Note  Medication history completed on July 21, 2023 by this writer prior to patient's PAC appointment. See Epic admission navigator for prior to admission medications. Operating room staff will still need to confirm medications and last dose information on day of surgery.     Medication history interview sources  Patient and CareEverywhere/SureScripts via in-person    Changes made to PTA medication list    Added: lipitor, norco,     Deleted: losartan, morphine, losartan, aspirin.     Changed: jardiance, coreg, lantus dose/sig, entresto dose/sig, ozempic,     -- No recent (within 30 days) course of antibiotics  -- No recent (within 30 days) course of systemic steroids  -- Reports not being on blood thinning medications    -- Declines being on any other prescription or over-the-counter medications    Prior to Admission medications    Medication Sig Last Dose Taking? Auth Provider Long Term End Date   acetaminophen (TYLENOL) 500 MG tablet Take 2 tablets (1,000 mg) by mouth every 8 hours  Patient taking differently: Take 1,000 mg by mouth every 6 hours as needed Taking Yes Kia Rivera, SAHRA     atorvastatin (LIPITOR) 20 MG tablet Take 20 mg by mouth daily Taking Yes Unknown, Entered By History No    carvedilol (COREG) 25 MG tablet Take 25 mg by mouth 2 times daily (with meals) Taking Yes Unknown, Entered By History Yes    empagliflozin (JARDIANCE) 25 MG TABS tablet Take 25 mg by mouth daily Taking Yes Unknown, Entered By History     HYDROcodone-acetaminophen (NORCO) 5-325 MG tablet Take 1 tablet by mouth 2 times daily as needed for severe pain Taking Yes Unknown, Entered By History     insulin glargine (LANTUS VIAL) 100 UNIT/ML vial Inject 36 units in the morning and 34 units in the evening. Taking Yes Reported, Patient Yes    metFORMIN (GLUCOPHAGE) 1000 MG tablet Take 1,000 mg by mouth 2 times daily (with meals) Taking Yes Reported, Patient Yes    sacubitril-valsartan  (ENTRESTO)  MG per tablet Take 1 tablet by mouth 2 times daily Taking Yes Unknown, Entered By History Yes    semaglutide (OZEMPIC, 1 MG/DOSE,) 2 MG/1.5ML pen Inject 1 mg Subcutaneous every 7 days Sundays. Taking Yes Unknown, Entered By History     spironolactone (ALDACTONE) 25 MG tablet Take 25 mg by mouth daily Taking Yes Reported, Patient Yes    torsemide (DEMADEX) 20 MG tablet Take 40 mg by mouth 2 times daily Taking Yes Reported, Patient Yes    glucose-vitamin C 4-6 GM-MG CHEW Take 1 tablet by mouth every hour as needed   Patient not taking: Reported on 7/21/2023 Not Taking  Reported, Patient Yes           Medication History Completed By: Gary Watson [facility] 7/21/2023 1:39 PM

## 2023-07-24 ENCOUNTER — TELEPHONE (OUTPATIENT)
Dept: ORTHOPEDICS | Facility: CLINIC | Age: 73
End: 2023-07-24

## 2023-07-24 NOTE — CONFIDENTIAL NOTE
Call placed to patient. Notified patient's hemoglobin A1c was 8.0 and surgery will need to be cancelled 8/16. Patient became extremely frustrated as he has been waiting a long time to have surgery. The pain he is experiencing is affecting his quality of life. Of note, PAC requiring stress ECHO to be optimized for surgery.     Patient does not see an endocrinologist. Offered to place referral to endocrinology and patient declined. Patient has a telephone visit with his PCP tomorrow regarding his next steps. Encouraged patient to inquire about blood sugar management or [facility] Relayed Dr. Duarte would perform surgery if Hgb A1c was less than 7.5. Encouraged patient to update writer after his appointment with his PCP.     Tara Holter, RNCC

## 2023-08-28 ENCOUNTER — TELEPHONE (OUTPATIENT)
Dept: ORTHOPEDICS | Facility: CLINIC | Age: 73
End: 2023-08-28

## 2023-08-28 NOTE — TELEPHONE ENCOUNTER
FYI - Status Update    Who is Calling: patient    Update: patient calling to update provider of his A1C. It is now below 7 and was told to report to provider. Will be taking another A1C today     Does caller want a call/response back: Yes     Could we send this information to you in SCOUPY or would you prefer to receive a phone call?:   Patient would prefer a phone call   Okay to leave a detailed message?: Yes at Cell number on file:    Telephone Information:   Mobile [phone]

## 2023-08-29 NOTE — CONFIDENTIAL NOTE
Returned call to patient. Since our last phone call [facility]. He was set up with a CGM device and believes this has helped to mange his high blood sugars. He reports his CGM predicts his Hgb A1c is 6.5. I informed patient we will need a lab drawn Hgb A1c less than 7.5 documented in his chart for Dr. Duarte to proceed with surgery. [facility] A1c. Dr. Duarte okay to schedule surgery after results are received and if less than 7.5.    Tara Holter, RNCC

## 2023-08-31 ENCOUNTER — TELEPHONE (OUTPATIENT)
Dept: ORTHOPEDICS | Facility: CLINIC | Age: 73
End: 2023-08-31

## 2023-08-31 NOTE — TELEPHONE ENCOUNTER
[facility] Call Center    Phone Message    May a detailed message be left on voicemail: yes     Reason for Call:     Please call patient to schedule surgery, He had his A1c drawn 8/30/23 and it was 6.6 and was mailed to him yesterday, so he would like to get his surgery scheduled as soon as possible       Action Taken: Message routed to:  [facility] [id number]    Travel Screening: Not Applicable

## 2023-08-31 NOTE — CONFIDENTIAL NOTE
Returned call to patient. Documentation of Hgb A1c located in [facility]. Confirmed Hgb A1c 8/29/2023 result of 6.6. Patient scheduled with PAC 9/1 at 8:15 am. Confirmed location and time of appointment. Patient confirmed he has a surgical packet. Message sent to perioperative  to confirm surgical date.     Tara Holter, RNCC

## 2023-09-01 ENCOUNTER — OFFICE VISIT (OUTPATIENT)
Dept: SURGERY | Facility: CLINIC | Age: 73
End: 2023-09-01
Payer: COMMERCIAL

## 2023-09-01 ENCOUNTER — TELEPHONE (OUTPATIENT)
Dept: SURGERY | Facility: CLINIC | Age: 73
End: 2023-09-01

## 2023-09-01 ENCOUNTER — HOSPITAL ENCOUNTER (OUTPATIENT)
Facility: CLINIC | Age: 73
End: 2023-09-01
Attending: ORTHOPAEDIC SURGERY | Admitting: ORTHOPAEDIC SURGERY
Payer: MEDICARE

## 2023-09-01 ENCOUNTER — PRE VISIT (OUTPATIENT)
Dept: SURGERY | Facility: CLINIC | Age: 73
End: 2023-09-01

## 2023-09-01 ENCOUNTER — ANESTHESIA EVENT (OUTPATIENT)
Dept: SURGERY | Facility: CLINIC | Age: 73
End: 2023-09-01
Payer: COMMERCIAL

## 2023-09-01 VITALS
BODY MASS INDEX: 30.16 KG/M2 | WEIGHT: 235 LBS | OXYGEN SATURATION: 96 % | DIASTOLIC BLOOD PRESSURE: 57 MMHG | SYSTOLIC BLOOD PRESSURE: 89 MMHG | HEART RATE: 70 BPM | HEIGHT: 74 IN | TEMPERATURE: 97.9 F | RESPIRATION RATE: 16 BRPM

## 2023-09-01 DIAGNOSIS — M79.672 PAIN IN LEFT FOOT: ICD-10-CM

## 2023-09-01 DIAGNOSIS — Z01.818 PREOP EXAMINATION: Primary | ICD-10-CM

## 2023-09-01 PROCEDURE — 99214 OFFICE O/P EST MOD 30 MIN: CPT | Performed by: PHYSICIAN ASSISTANT

## 2023-09-01 ASSESSMENT — PAIN SCALES - GENERAL: PAINLEVEL: MODERATE PAIN (4)

## 2023-09-01 ASSESSMENT — ENCOUNTER SYMPTOMS
ORTHOPNEA: 0
SEIZURES: 0

## 2023-09-01 ASSESSMENT — LIFESTYLE VARIABLES: TOBACCO_USE: 0

## 2023-09-01 ASSESSMENT — COPD QUESTIONNAIRES: COPD: 0

## 2023-09-01 NOTE — TELEPHONE ENCOUNTER
Updated Reilly of his stress test on 9/8, 1:00 pm at [place].  Reviewed the prep, including to hold his coreg prior.  Reilly expressed understanding.  Freida Wei RN

## 2023-09-01 NOTE — PATIENT INSTRUCTIONS
Preparing for Your Surgery      Name:  Reilly Borja   MRN:  [id number]   :  1950   Today's Date:  2023         Arriving for surgery:  Surgery date:  23  Arrival time:  11.30AM    Restrictions due to COVID 19:    Please maintain social distance.  Masking is optional.      parking is available for anyone with mobility limitations or disabilities. (Monday- Friday 7 am- 5 pm)    Please come to:    [facility]  [address] [place] [id number]    Please check in on the 5th floor at the Ambulatory Surgery Center.      What can I eat or drink?    -  You may eat and drink normally until 8 hours prior to arrival  time. (Until 3.30AM)  -  You may have clear liquids until 2 hours prior to arrival  time. (Until 9.30AM)    Examples of clear liquids:  Water  Clear broth  Juices (apple, white grape, white cranberry  and cider) without pulp  Noncarbonated, powder based beverages  (lemonade and Gaetano-Aid)  Sodas (Sprite, 7-Up, ginger ale and seltzer)  Coffee or tea (without milk or cream)  Gatorade      Which medicines can I take?    Hold Aspirin for 7 days before surgery.   Hold Multivitamins for 7 days before surgery.  Hold Supplements for 7 days before surgery.  Hold Ibuprofen (Advil, Motrin) for 1 day before surgery--unless otherwise directed by surgeon.  Hold Naproxen (Aleve) for 4 days before surgery.    Hold Jardiance for 3 days before surgery.  Take ONLY 27 Units of insulin LANTUS evening before surgery.  Hold Semaglutide (Ozempic) injection 9/10/23. Last dose is on 9/3/23.    No alcohol or cannabis products for 24 hours prior to procedure.      -  DO NOT take the following medications the day of surgery:  Metformin, (If you are taking one short acting insulin, please hold it on the day of surgery).    -  PLEASE TAKE the following medications the day of surgery:   Tylenol (as needed), Atorvastatin (Lipitor), Carvedilol (Coreg),  Norco (as needed), Entresto,  Spironolactone (Aldactone), Torsemide (Demadex).      How do I prepare myself?  - Please take 2 showers (one the night prior to surgery and one the morning of surgery) using Scrubcare or Hibiclens soap.    Use this soap only from the neck to your toes.     Leave the soap on your skin for one minute--then rinse thoroughly.      You may use your own shampoo and conditioner. No other hair products.   - Please remove all jewelry and body piercings.  - No lotions, deodorants or fragrance.  - No makeup or fingernail polish.   - Bring your ID and insurance card.    -If you have a Deep Brain Stimulator, a Spinal Cord Stimulator, or any implanted Neuro Device, you must bring the remote to the Surgery Center.         ALL PATIENTS ARE REQUIRED TO HAVE A RESPONSIBLE ADULT TO DRIVE AND BE IN ATTENDANCE WITH THEM FOR 24 HOURS FOLLOWING SURGERY.     Covid testing policy as of 12/06/2022  Your surgeon will notify and schedule you for a COVID test if one is needed before surgery--please direct any questions or COVID symptoms to your surgeon      Questions or Concerns:    -For questions regarding the day of surgery, please contact the Ambulatory Surgery Center at [phone].    -If you have health changes between today and your surgery, please contact your surgeon.     - For questions after surgery, please contact your surgeon's office.

## 2023-09-01 NOTE — H&P
Pre-Operative H & P     CC:  Preoperative exam to assess for increased cardiopulmonary risk while undergoing surgery and anesthesia.    Date of Encounter: 9/1/2023  Primary Care Physician:  No Ref-Primary, Physician     Reason for visit:   Encounter Diagnoses   Name Primary?    Preop examination Yes    Pain in left foot        HPI  Reilly Borja is a 73 year old male who presents for pre-operative H & P in preparation for  Procedure Information       Case: [id number] Date/Time: 09/13/23 1300    Procedure: left talonavicular and calcanecuboid joint fusion (Left: Ankle)    Anesthesia type: Choice    Diagnosis: Pain in left foot [M79.672]    Pre-op diagnosis: Pain in left foot [M79.672]    Location: [place] [address] / [facility]    Providers: Orlando Duarte MD            Mr. Borja has a PMH significant for HTN, HLD, CHF, Type II DM, NAFLD, EL, and gout who has ongoing pain in his left foot. He has been following with Dr. Duarte for this and has previously undergone a left talonavicular and calcaneocuboid joint injection under fluoroscopic control with improvement in his symptoms. He most recently consulted with Dr. Duarte on 7/18/23 and a plan to was made to proceed with surgery as long as his A1C is below 7.5. Recent A1c is 6.6 on 8/29/23.    He was seen in PAC 7/21/23 and a stress echocardiogram was ordered but has not been completed.  In addition[facility] (see [facility] review 'notes' section) EF is 25-30% which precludes surgery at [facility].    History is obtained from the patient and chart review    Hx of abnormal bleeding or anti-platelet use: denies      Past Medical History  Past Medical History:   Diagnosis Date    Congestive heart failure (H)     Diabetes (H)     Hypertension     Iron deficiency anemia secondary to inadequate dietary iron intake        Past Surgical History  Past Surgical History:   Procedure Laterality Date    ARTHROSCOPY KNEE  Left     IRRIGATION AND DEBRIDEMENT LOWER EXTREMITY, COMBINED Left 9/17/2021    Procedure: IRRIGATION AND DEBRIDEMENT  WITH BONE BIOPSY LEFT FOOT;  Surgeon: Julius Campbell DO;  Location: [facility] OR    OTHER SURGICAL HISTORY Left     surgery for charcot of left foot    REMOVE HARDWARE LOWER EXTREMITY Left 9/17/2021    Procedure: REMOVAL OF HARDWARE LEFT FOOT DEEP;  Surgeon: Julius Campbell DO;  Location: [facility] LUMBAR SPINE FUSN,POST INTRBDY Right 02/06/2017    Procedure: RIGHT L5-S1 TRANSFORAMINAL LUMBAR INTERBODY FUSION;  Surgeon: Rajiv Zavala MD;  Location: [facility];  Service: Spine       Prior to Admission Medications  Current Outpatient Medications   Medication Sig Dispense Refill    acetaminophen (TYLENOL) 500 MG tablet Take 2 tablets (1,000 mg) by mouth every 8 hours (Patient taking differently: Take 1,000 mg by mouth every 6 hours as needed)      carvedilol (COREG) 25 MG tablet Take 25 mg by mouth 2 times daily (with meals)      empagliflozin (JARDIANCE) 25 MG TABS tablet Take 25 mg by mouth every morning      insulin glargine (LANTUS VIAL) 100 UNIT/ML vial Inject 36 Units Subcutaneous 2 times daily Inject 36 units in the morning and 34 units in the evening.      metFORMIN (GLUCOPHAGE) 1000 MG tablet Take 1,000 mg by mouth 2 times daily (with meals)      sacubitril-valsartan (ENTRESTO)  MG per tablet Take 1 tablet by mouth 2 times daily      semaglutide (OZEMPIC, 1 MG/DOSE,) 2 MG/1.5ML pen Inject 1 mg Subcutaneous every 7 days Sunday      spironolactone (ALDACTONE) 25 MG tablet Take 25 mg by mouth      torsemide (DEMADEX) 20 MG tablet Take 40 mg by mouth 2 times daily      glucose-vitamin C 4-6 GM-MG CHEW Take 1 tablet by mouth every hour as needed         Allergies  Allergies   Allergen Reactions    Simvastatin Muscle Pain (Myalgia)       Social History  Social History     Socioeconomic History    Marital status: Single     Spouse name: Not  on file    Number of children: Not on file    Years of education: Not on file    Highest education level: Not on file   Occupational History    Not on file   Tobacco Use    Smoking status: Never     Passive exposure: Never    Smokeless tobacco: Never   Vaping Use    Vaping Use: Never used   Substance and Sexual Activity    Alcohol use: Not Currently    Drug use: Never    Sexual activity: Yes     Partners: Female   Other Topics Concern    Parent/sibling w/ CABG, MI or angioplasty before 65F 55M? Not Asked   Social History Narrative    Not on file     Social Determinants of Health     Financial Resource Strain: Not on file   Food Insecurity: Not on file   Transportation Needs: Not on file   Physical Activity: Not on file   Stress: Not on file   Social Connections: Not on file   Intimate Partner Violence: Not on file   Housing Stability: Not on file       Family History  Family History   Problem Relation Age of Onset    Venous thrombosis Mother     Diabetes Mother     Anesthesia Reaction No family hx of        Review of Systems  The complete review of systems is negative other than noted in the HPI or here.     Anesthesia Evaluation   Pt has had prior anesthetic. Type: General.    No history of anesthetic complications       ROS/MED HX  ENT/Pulmonary:     (+)     WILNER risk factors,  hypertension,                             (-) tobacco use, asthma, COPD, sleep apnea and recent URI   Neurologic:  - neg neurologic ROS  (-) no seizures and no CVA   Cardiovascular:     (+) Dyslipidemia hypertension- -   -  - -      CHF  Last EF: 25-30%                         Previous cardiac testing   Echo: Date: Results:    Stress Test:  Date: Results:    ECG Reviewed:  Date: 9/16/21 Results:  SR  Cath:  Date: Results:   (-) orthopnea/PND   METS/Exercise Tolerance: 1 - Eating, dressing Comment: - Cannot walk 1/2 city block due to pain in left foot.  - Denies CP with exertion but does have mild SOB with exertion. Denies LE edema,  "orthopnea, or PND.    Hematologic:     (+)      anemia,       (-) history of blood clots and history of blood transfusion   Musculoskeletal: Comment: - Pain in left foot  - s/p spinal surgery  - Gout, no recent flares      GI/Hepatic:     (+)             liver disease (NAFLD),    (-) GERD   Renal/Genitourinary:    (-) renal disease and nephrolithiasis   Endo:     (+)  type II DM, Last HgA1c: 6.6, date: 8/29/23, Not using insulin,  Normal glucose range: Does not check at home, not previously admitted for DM/DKA.           (-) thyroid disease and chronic steroid usage   Psychiatric/Substance Use:  - neg psychiatric ROS     Infectious Disease:  - neg infectious disease ROS     Malignancy:  - neg malignancy ROS     Other:            BP (!) 89/57 (BP Location: Left arm, Patient Position: Chair, Cuff Size: Adult Large)   Pulse 70   Temp 97.9  F (36.6  C) (Oral)   Resp 16   Ht 1.88 m (6' 2\")   Wt 106.6 kg (235 lb)   SpO2 96%   BMI 30.17 kg/m      Physical Exam   Constitutional: Awake, alert, cooperative, no apparent distress, and appears stated age.  Eyes: Pupils equal, round and reactive to light, extra ocular muscles intact, sclera clear, conjunctiva normal.  HENT: Normocephalic, oral pharynx with moist mucus membranes, good dentition. No goiter appreciated.   Respiratory: Clear to auscultation bilaterally, no crackles or wheezing.  Cardiovascular: Regular rate and rhythm, normal S1 and S2, and no murmur noted.  Carotids +2, no bruits. No edema. Palpable pulses to radial and PT arteriy of right LE. Wearing brace on LLE.   GI: Not assessed  Lymph/Hematologic: No cervical lymphadenopathy and no supraclavicular lymphadenopathy.  Genitourinary:  deferred  Skin: Warm and dry.     Musculoskeletal: Full ROM of neck. There is no redness, warmth, or swelling of the joints. Gross motor strength is normal.    Neurologic: Awake, alert, oriented to name, place and time. Cranial nerves II-XII are grossly intact. "   Neuropsychiatric: Calm, cooperative. Normal affect.     Prior Labs/Diagnostic Studies   All labs and imaging personally reviewed     Component      Latest Ref Rng 7/21/2023  2:17 PM   WBC      4.0 - 11.0 10e3/uL 6.9    RBC Count      4.40 - 5.90 10e6/uL 4.67    Hemoglobin      13.3 - 17.7 g/dL 12.6 (L)    Hematocrit      40.0 - 53.0 % 41.2    MCV      78 - 100 fL 88    MCH      26.5 - 33.0 pg 27.0    MCHC      31.5 - 36.5 g/dL 30.6 (L)    RDW      10.0 - 15.0 % 14.7    Platelet Count      150 - 450 10e3/uL 363       Legend:  (L) Low    Component      Latest Ref Rng 7/21/2023  2:17 PM   Sodium      136 - 145 mmol/L 138    Potassium      3.4 - 5.3 mmol/L 3.7    Chloride      98 - 107 mmol/L 96 (L)    Carbon Dioxide (CO2)      22 - 29 mmol/L 29    Anion Gap      7 - 15 mmol/L 13    Urea Nitrogen      8.0 - 23.0 mg/dL 18.8    Creatinine      0.67 - 1.17 mg/dL 0.98    Calcium      8.8 - 10.2 mg/dL 9.0    Glucose      70 - 99 mg/dL 222 (H)    Alkaline Phosphatase      40 - 129 U/L 222 (H)    AST      0 - 45 U/L 21    ALT      0 - 70 U/L 12    Protein Total      6.4 - 8.3 g/dL 8.2    Albumin      3.5 - 5.2 g/dL 3.7    Bilirubin Total      <=1.2 mg/dL 0.3    GFR Estimate      >60 mL/min/1.73m2 82       Legend:  (L) Low  (H) High    HEMOGLOBIN A1C HEMOGLOBIN A1C/HEMOGLOBIN.TOTAL IN BLOOD 6.6 4.0 - 6.0 08/29/2023 H       EKG/ stress test - if available please see in ROS above       The patient's records and results personally reviewed by this provider.     Outside records reviewed from: Care Everywhere    LAB/DIAGNOSTIC STUDIES TODAY:  none    Assessment    Reilly Borja is a 73 year old male seen as a PAC referral for risk assessment and optimization for anesthesia.    Plan/Recommendations  Pt will be optimized for the proposed procedure.  See below for details on the assessment, risk, and preoperative recommendations    NEUROLOGY  - No history of TIA, CVA or seizure  - Chronic Pain  On chronic opiates, morphine  "equivilant = 10 MME/Day   -Post Op delirium risk factors:  Age and High co-morbid index    ENT  - No current airway concerns.  Will need to be reassessed day of surgery.  Mallampati: I  TM: > 3    CARDIAC  - HFrEF. [facility] (see chart review 'notes' section) EF is 25-30% which precludes surgery at [facility]. Will send staff message to Dr. Duarte and RNCC, Tara Holter  - currently managed on carvedilol, Entresto, spironolactone and torsemide   - reports symptoms of mild HUNT and METS < 4   - stress echocardiogram previously ordered by PAC (elevated RCRI and <4 METS) but pt has not completed. Needs to complete prior to above surgery  - HTN, HLD  - BP low at 89/57.  Pt is asymptomatic and states he \"feels fine\". Denies dizziness, lightheadedness, SOB, chest pain.  Gave patient water to drink during visit.    - METS (Metabolic Equivalents)  Patient CANNOT perform 4 METS exercise without symptoms            Total Score: 1    Functional Capacity: Unable to complete 4 METS      RCRI-Moderate risk: Class 3  6.6% complication rate            Total Score: 2    RCRI: CHF    RCRI: Diabetes        PULMONARY    WILNER Medium Risk            Total Score: 3    WILNER: Hypertension    WILNER: Over 50 ys old    WILNER: Male      - Denies asthma or inhaler use  - Tobacco History    History   Smoking Status    Never   Smokeless Tobacco    Never       GI  - denies GERD  PONV Medium Risk  Total Score: 2           1 AN PONV: Patient is not a current smoker    1 AN PONV: Intended Post Op Opioids      - NAFLD      /RENAL  - Baseline Creatinine  0.9    ENDOCRINE    - BMI: Estimated body mass index is 30.17 kg/m  as calculated from the following:    Height as of this encounter: 1.88 m (6' 2\").    Weight as of this encounter: 106.6 kg (235 lb).  Overweight (BMI 25.0-29.9)  - Diabetes  Diabetes Type 2, insulin dependent. Hold morning oral hypoglycemic medications and short acting insulin DOS. Take 80% of last scheduled dose of long-acting insulin " prior to procedure.  Recommend close monitoring of the patient's blood glucose levels throughout the perioperative period.  - hold jardiance for 3 days prior to DOS  - hold Ozempic 7 days prior to DOS    - A1c of 6.6% on 8/29/23    HEME  VTE Medium Risk 1.8%            Total Score: 7    VTE: Greater than 59 yrs old    VTE: Male    VTE: Family Hx of VTE      - No history of abnormal bleeding or antiplatelet use.  - Hb 12.6    MSK  -  Status post left foot Charcot arthropathy (see HPI) - surgery as above. Currently ambulating with boot and crutches.   - s/p spinal surgery  - Gout, no recent flares        Different anesthesia methods/types have been discussed with the patient, but they are aware that the final plan will be decided by the assigned anesthesia provider on the date of service.      The patient is optimized for their procedure. Surgery needs to be moved to OR setting and patient must complete cardiac stress test.    AVS with information on surgery time/arrival time, meds and NPO status given by nursing staff. No further diagnostic testing indicated.      ADDENDUM 9/8/23  Lexiscan 9/8  Interpretation Summary  Normal, low-risk dobutamine echocardiogram.     The target heart rate was achieved. Normal heart rate response to dobutamine.  BP response was normal.  Baseline LVEF is borderline low at 50-55%. RV size and function is normal.  With dobutamine, LVEF increased to >70% and LV cavity size decreased  appropriately.  No regional wall motion abnormalities are present at rest or with dobutamine.  No angina was elicited.  No ECG evidence of ischemia. Frequent PAC/PVC during drug infusion.  No significant valvular abnormalities are noted on screening Doppler exam.  The aortic root and visualized ascending aorta are benita        On the day of service:     Prep time: 16 minutes  Visit time: 10 minutes  Documentation time: 12 minutes  ------------------------------------------  Total time: 38 minutes      Nupur CHAO  SAHRA Collins  Preoperative Assessment Center  [facility]  Clinic and Surgery Center  Phone: [phone]  Fax: [phone]

## 2023-09-01 NOTE — TELEPHONE ENCOUNTER
FUTURE VISIT INFORMATION      SURGERY INFORMATION:  Date: 9/13/23  Location:  or  Surgeon:  Orlando Duarte MD   Anesthesia Type:  choice  Procedure: left talonavicular and calcanecuboid joint fusion   Consult: virtual visit 7/18    RECORDS REQUESTED FROM:       Primary Care Provider: [facility]    Pertinent Medical History: hypertension, chronic heart failure    Most recent Cardiac Stress Test: 7/21/23

## 2023-09-08 ENCOUNTER — HOSPITAL ENCOUNTER (OUTPATIENT)
Dept: CARDIOLOGY | Facility: CLINIC | Age: 73
Discharge: HOME | End: 2023-09-08
Payer: COMMERCIAL

## 2023-09-08 PROCEDURE — 258N000003 HC RX IP 258 OP 636: Performed by: INTERNAL MEDICINE

## 2023-09-08 PROCEDURE — 250N000009 HC RX 250: Performed by: INTERNAL MEDICINE

## 2023-09-08 PROCEDURE — 93018 CV STRESS TEST I&R ONLY: CPT | Performed by: INTERNAL MEDICINE

## 2023-09-08 PROCEDURE — 93325 DOPPLER ECHO COLOR FLOW MAPG: CPT | Mod: 26 | Performed by: INTERNAL MEDICINE

## 2023-09-08 PROCEDURE — 93321 DOPPLER ECHO F-UP/LMTD STD: CPT | Mod: 26 | Performed by: INTERNAL MEDICINE

## 2023-09-08 PROCEDURE — 93350 STRESS TTE ONLY: CPT | Mod: 26 | Performed by: INTERNAL MEDICINE

## 2023-09-08 PROCEDURE — 999N000208 ECHO STRESS ECHOCARDIOGRAM

## 2023-09-08 PROCEDURE — 250N000011 HC RX IP 250 OP 636: Performed by: INTERNAL MEDICINE

## 2023-09-08 PROCEDURE — 93321 DOPPLER ECHO F-UP/LMTD STD: CPT | Mod: TC

## 2023-09-08 PROCEDURE — 255N000002 HC RX 255 OP 636: Performed by: INTERNAL MEDICINE

## 2023-09-08 PROCEDURE — 93016 CV STRESS TEST SUPVJ ONLY: CPT | Performed by: INTERNAL MEDICINE

## 2023-09-08 RX ORDER — ATROPINE SULFATE 0.4 MG/ML
.2-2 AMPUL (ML) INJECTION
Status: DISCONTINUED | OUTPATIENT
Start: 2023-09-08 | End: 2023-09-08

## 2023-09-08 RX ORDER — DOBUTAMINE HYDROCHLORIDE 200 MG/100ML
10-50 INJECTION INTRAVENOUS CONTINUOUS
Status: ACTIVE | OUTPATIENT
Start: 2023-09-08 | End: 2023-09-08

## 2023-09-08 RX ORDER — SODIUM CHLORIDE 9 MG/ML
INJECTION, SOLUTION INTRAVENOUS CONTINUOUS
Status: ACTIVE | OUTPATIENT
Start: 2023-09-08 | End: 2023-09-08

## 2023-09-08 RX ORDER — METOPROLOL TARTRATE 1 MG/ML
1-20 INJECTION, SOLUTION INTRAVENOUS
Status: ACTIVE | OUTPATIENT
Start: 2023-09-08 | End: 2023-09-08

## 2023-09-08 RX ADMIN — PERFLUTREN 8 ML: 6.52 INJECTION, SUSPENSION INTRAVENOUS at 11:32

## 2023-09-08 RX ADMIN — DOBUTAMINE 10 MCG/KG/MIN: 12.5 INJECTION, SOLUTION, CONCENTRATE INTRAVENOUS at 11:19

## 2023-09-08 RX ADMIN — METOPROLOL TARTRATE 2 MG: 5 INJECTION INTRAVENOUS at 11:29

## 2023-09-13 ENCOUNTER — ANCILLARY ORDERS (OUTPATIENT)
Dept: ORTHOPEDICS | Facility: CLINIC | Age: 73
End: 2023-09-13

## 2023-09-13 ENCOUNTER — ANESTHESIA (OUTPATIENT)
Dept: SURGERY | Facility: CLINIC | Age: 73
End: 2023-09-13
Payer: COMMERCIAL

## 2023-09-13 ENCOUNTER — APPOINTMENT (OUTPATIENT)
Dept: GENERAL RADIOLOGY | Facility: CLINIC | Age: 73
End: 2023-09-13
Attending: ORTHOPAEDIC SURGERY
Payer: COMMERCIAL

## 2023-09-13 ENCOUNTER — HOSPITAL ENCOUNTER (OUTPATIENT)
Facility: CLINIC | Age: 73
Discharge: HOME | End: 2023-09-13
Attending: ORTHOPAEDIC SURGERY | Admitting: ORTHOPAEDIC SURGERY
Payer: COMMERCIAL

## 2023-09-13 VITALS
BODY MASS INDEX: 31.58 KG/M2 | TEMPERATURE: 97.7 F | DIASTOLIC BLOOD PRESSURE: 75 MMHG | SYSTOLIC BLOOD PRESSURE: 149 MMHG | HEART RATE: 72 BPM | WEIGHT: 246.03 LBS | HEIGHT: 74 IN | RESPIRATION RATE: 14 BRPM | OXYGEN SATURATION: 96 %

## 2023-09-13 DIAGNOSIS — M79.672 PAIN IN LEFT FOOT: Primary | ICD-10-CM

## 2023-09-13 DIAGNOSIS — M14.672 CHARCOT'S JOINT OF LEFT FOOT: ICD-10-CM

## 2023-09-13 LAB
GLUCOSE BLDC GLUCOMTR-MCNC: 110 MG/DL (ref 70–99)
GLUCOSE BLDC GLUCOMTR-MCNC: 99 MG/DL (ref 70–99)

## 2023-09-13 PROCEDURE — 360N000083 HC SURGERY LEVEL 3 W/ FLUORO, PER MIN: Performed by: ORTHOPAEDIC SURGERY

## 2023-09-13 PROCEDURE — 250N000025 HC SEVOFLURANE, PER MIN: Performed by: ORTHOPAEDIC SURGERY

## 2023-09-13 PROCEDURE — 710N000010 HC RECOVERY PHASE 1, LEVEL 2, PER MIN: Performed by: ORTHOPAEDIC SURGERY

## 2023-09-13 PROCEDURE — 370N000017 HC ANESTHESIA TECHNICAL FEE, PER MIN: Performed by: ORTHOPAEDIC SURGERY

## 2023-09-13 PROCEDURE — 999N000141 HC STATISTIC PRE-PROCEDURE NURSING ASSESSMENT: Performed by: ORTHOPAEDIC SURGERY

## 2023-09-13 PROCEDURE — 258N000003 HC RX IP 258 OP 636: Performed by: NURSE ANESTHETIST, CERTIFIED REGISTERED

## 2023-09-13 PROCEDURE — 271N000001 HC OR GENERAL SUPPLY NON-STERILE: Performed by: ORTHOPAEDIC SURGERY

## 2023-09-13 PROCEDURE — 710N000012 HC RECOVERY PHASE 2, PER MINUTE: Performed by: ORTHOPAEDIC SURGERY

## 2023-09-13 PROCEDURE — 999N000180 XR SURGERY CARM FLUORO LESS THAN 5 MIN: Mod: TC

## 2023-09-13 PROCEDURE — 250N000009 HC RX 250: Performed by: NURSE ANESTHETIST, CERTIFIED REGISTERED

## 2023-09-13 PROCEDURE — 250N000011 HC RX IP 250 OP 636: Mod: JZ | Performed by: STUDENT IN AN ORGANIZED HEALTH CARE EDUCATION/TRAINING PROGRAM

## 2023-09-13 PROCEDURE — 82962 GLUCOSE BLOOD TEST: CPT

## 2023-09-13 PROCEDURE — 250N000009 HC RX 250: Performed by: STUDENT IN AN ORGANIZED HEALTH CARE EDUCATION/TRAINING PROGRAM

## 2023-09-13 PROCEDURE — 272N000001 HC OR GENERAL SUPPLY STERILE: Performed by: ORTHOPAEDIC SURGERY

## 2023-09-13 PROCEDURE — 250N000011 HC RX IP 250 OP 636: Mod: JZ | Performed by: NURSE ANESTHETIST, CERTIFIED REGISTERED

## 2023-09-13 PROCEDURE — 250N000009 HC RX 250: Performed by: ORTHOPAEDIC SURGERY

## 2023-09-13 PROCEDURE — C1713 ANCHOR/SCREW BN/BN,TIS/BN: HCPCS | Performed by: ORTHOPAEDIC SURGERY

## 2023-09-13 PROCEDURE — 250N000011 HC RX IP 250 OP 636: Performed by: STUDENT IN AN ORGANIZED HEALTH CARE EDUCATION/TRAINING PROGRAM

## 2023-09-13 PROCEDURE — 250N000011 HC RX IP 250 OP 636: Performed by: ORTHOPAEDIC SURGERY

## 2023-09-13 PROCEDURE — C1762 CONN TISS, HUMAN(INC FASCIA): HCPCS | Performed by: ORTHOPAEDIC SURGERY

## 2023-09-13 DEVICE — GRAFT BN CANC 30CC CRSH 1-10MM 800104: Type: IMPLANTABLE DEVICE | Site: ANKLE | Status: FUNCTIONAL

## 2023-09-13 DEVICE — IMPLANTABLE DEVICE: Type: IMPLANTABLE DEVICE | Site: ANKLE | Status: FUNCTIONAL

## 2023-09-13 RX ORDER — NALOXONE HYDROCHLORIDE 0.4 MG/ML
0.4 INJECTION, SOLUTION INTRAMUSCULAR; INTRAVENOUS; SUBCUTANEOUS
Status: DISCONTINUED | OUTPATIENT
Start: 2023-09-13 | End: 2023-09-13 | Stop reason: HOSPADM

## 2023-09-13 RX ORDER — PROPOFOL 10 MG/ML
INJECTION, EMULSION INTRAVENOUS PRN
Status: DISCONTINUED | OUTPATIENT
Start: 2023-09-13 | End: 2023-09-13

## 2023-09-13 RX ORDER — DEXAMETHASONE SODIUM PHOSPHATE 10 MG/ML
INJECTION, SOLUTION INTRAMUSCULAR; INTRAVENOUS
Status: COMPLETED | OUTPATIENT
Start: 2023-09-13 | End: 2023-09-13

## 2023-09-13 RX ORDER — ONDANSETRON 2 MG/ML
4 INJECTION INTRAMUSCULAR; INTRAVENOUS EVERY 30 MIN PRN
Status: DISCONTINUED | OUTPATIENT
Start: 2023-09-13 | End: 2023-09-13 | Stop reason: HOSPADM

## 2023-09-13 RX ORDER — OXYCODONE HYDROCHLORIDE 5 MG/1
5-10 TABLET ORAL EVERY 4 HOURS PRN
Qty: 12 TABLET | Refills: 0 | Status: SHIPPED | OUTPATIENT
Start: 2023-09-13 | End: 2023-09-15

## 2023-09-13 RX ORDER — FENTANYL CITRATE 50 UG/ML
25 INJECTION, SOLUTION INTRAMUSCULAR; INTRAVENOUS EVERY 5 MIN PRN
Status: DISCONTINUED | OUTPATIENT
Start: 2023-09-13 | End: 2023-09-13 | Stop reason: HOSPADM

## 2023-09-13 RX ORDER — NALOXONE HYDROCHLORIDE 0.4 MG/ML
0.2 INJECTION, SOLUTION INTRAMUSCULAR; INTRAVENOUS; SUBCUTANEOUS
Status: DISCONTINUED | OUTPATIENT
Start: 2023-09-13 | End: 2023-09-13 | Stop reason: HOSPADM

## 2023-09-13 RX ORDER — CEFAZOLIN SODIUM/WATER 2 G/20 ML
2 SYRINGE (ML) INTRAVENOUS SEE ADMIN INSTRUCTIONS
Status: DISCONTINUED | OUTPATIENT
Start: 2023-09-13 | End: 2023-09-13 | Stop reason: HOSPADM

## 2023-09-13 RX ORDER — CEFAZOLIN SODIUM/WATER 2 G/20 ML
2 SYRINGE (ML) INTRAVENOUS
Status: COMPLETED | OUTPATIENT
Start: 2023-09-13 | End: 2023-09-13

## 2023-09-13 RX ORDER — DEXMEDETOMIDINE HYDROCHLORIDE 4 UG/ML
INJECTION, SOLUTION INTRAVENOUS
Status: COMPLETED | OUTPATIENT
Start: 2023-09-13 | End: 2023-09-13

## 2023-09-13 RX ORDER — HYDROXYZINE HYDROCHLORIDE 25 MG/1
25 TABLET, FILM COATED ORAL 3 TIMES DAILY PRN
Qty: 15 TABLET | Refills: 0 | Status: SHIPPED | OUTPATIENT
Start: 2023-09-13

## 2023-09-13 RX ORDER — HYDROXYZINE HYDROCHLORIDE 10 MG/1
10 TABLET, FILM COATED ORAL
Status: DISCONTINUED | OUTPATIENT
Start: 2023-09-13 | End: 2023-09-13 | Stop reason: HOSPADM

## 2023-09-13 RX ORDER — OXYCODONE HYDROCHLORIDE 5 MG/1
5 TABLET ORAL
Status: DISCONTINUED | OUTPATIENT
Start: 2023-09-13 | End: 2023-09-13 | Stop reason: HOSPADM

## 2023-09-13 RX ORDER — SODIUM CHLORIDE, SODIUM LACTATE, POTASSIUM CHLORIDE, CALCIUM CHLORIDE 600; 310; 30; 20 MG/100ML; MG/100ML; MG/100ML; MG/100ML
INJECTION, SOLUTION INTRAVENOUS CONTINUOUS PRN
Status: DISCONTINUED | OUTPATIENT
Start: 2023-09-13 | End: 2023-09-13

## 2023-09-13 RX ORDER — BUPIVACAINE HYDROCHLORIDE 2.5 MG/ML
INJECTION, SOLUTION EPIDURAL; INFILTRATION; INTRACAUDAL
Status: COMPLETED | OUTPATIENT
Start: 2023-09-13 | End: 2023-09-13

## 2023-09-13 RX ORDER — OXYCODONE HYDROCHLORIDE 10 MG/1
10 TABLET ORAL
Status: DISCONTINUED | OUTPATIENT
Start: 2023-09-13 | End: 2023-09-13 | Stop reason: HOSPADM

## 2023-09-13 RX ORDER — HYDROXYZINE HYDROCHLORIDE 25 MG/1
25 TABLET, FILM COATED ORAL EVERY 8 HOURS PRN
Qty: 30 TABLET | Refills: 0 | Status: CANCELLED | OUTPATIENT
Start: 2023-09-13

## 2023-09-13 RX ORDER — ONDANSETRON 4 MG/1
4 TABLET, ORALLY DISINTEGRATING ORAL EVERY 30 MIN PRN
Status: DISCONTINUED | OUTPATIENT
Start: 2023-09-13 | End: 2023-09-13 | Stop reason: HOSPADM

## 2023-09-13 RX ORDER — FENTANYL CITRATE 50 UG/ML
25-50 INJECTION, SOLUTION INTRAMUSCULAR; INTRAVENOUS
Status: DISCONTINUED | OUTPATIENT
Start: 2023-09-13 | End: 2023-09-13 | Stop reason: HOSPADM

## 2023-09-13 RX ORDER — LIDOCAINE HYDROCHLORIDE 20 MG/ML
INJECTION, SOLUTION INFILTRATION; PERINEURAL PRN
Status: DISCONTINUED | OUTPATIENT
Start: 2023-09-13 | End: 2023-09-13

## 2023-09-13 RX ORDER — SODIUM CHLORIDE, SODIUM LACTATE, POTASSIUM CHLORIDE, CALCIUM CHLORIDE 600; 310; 30; 20 MG/100ML; MG/100ML; MG/100ML; MG/100ML
INJECTION, SOLUTION INTRAVENOUS CONTINUOUS
Status: DISCONTINUED | OUTPATIENT
Start: 2023-09-13 | End: 2023-09-13 | Stop reason: HOSPADM

## 2023-09-13 RX ORDER — FENTANYL CITRATE 50 UG/ML
50 INJECTION, SOLUTION INTRAMUSCULAR; INTRAVENOUS EVERY 5 MIN PRN
Status: DISCONTINUED | OUTPATIENT
Start: 2023-09-13 | End: 2023-09-13 | Stop reason: HOSPADM

## 2023-09-13 RX ORDER — LIDOCAINE 40 MG/G
CREAM TOPICAL
Status: DISCONTINUED | OUTPATIENT
Start: 2023-09-13 | End: 2023-09-13 | Stop reason: HOSPADM

## 2023-09-13 RX ORDER — MAGNESIUM HYDROXIDE 1200 MG/15ML
LIQUID ORAL PRN
Status: DISCONTINUED | OUTPATIENT
Start: 2023-09-13 | End: 2023-09-13 | Stop reason: HOSPADM

## 2023-09-13 RX ORDER — HYDROMORPHONE HYDROCHLORIDE 1 MG/ML
0.4 INJECTION, SOLUTION INTRAMUSCULAR; INTRAVENOUS; SUBCUTANEOUS EVERY 5 MIN PRN
Status: DISCONTINUED | OUTPATIENT
Start: 2023-09-13 | End: 2023-09-13 | Stop reason: HOSPADM

## 2023-09-13 RX ORDER — FLUMAZENIL 0.1 MG/ML
0.2 INJECTION, SOLUTION INTRAVENOUS
Status: DISCONTINUED | OUTPATIENT
Start: 2023-09-13 | End: 2023-09-13 | Stop reason: HOSPADM

## 2023-09-13 RX ORDER — ONDANSETRON 2 MG/ML
INJECTION INTRAMUSCULAR; INTRAVENOUS PRN
Status: DISCONTINUED | OUTPATIENT
Start: 2023-09-13 | End: 2023-09-13

## 2023-09-13 RX ORDER — HYDROMORPHONE HYDROCHLORIDE 1 MG/ML
0.2 INJECTION, SOLUTION INTRAMUSCULAR; INTRAVENOUS; SUBCUTANEOUS EVERY 5 MIN PRN
Status: DISCONTINUED | OUTPATIENT
Start: 2023-09-13 | End: 2023-09-13 | Stop reason: HOSPADM

## 2023-09-13 RX ADMIN — SODIUM CHLORIDE, POTASSIUM CHLORIDE, SODIUM LACTATE AND CALCIUM CHLORIDE: 600; 310; 30; 20 INJECTION, SOLUTION INTRAVENOUS at 13:03

## 2023-09-13 RX ADMIN — DEXMEDETOMIDINE 20 MCG: 100 INJECTION, SOLUTION, CONCENTRATE INTRAVENOUS at 12:35

## 2023-09-13 RX ADMIN — ONDANSETRON 4 MG: 2 INJECTION INTRAMUSCULAR; INTRAVENOUS at 14:01

## 2023-09-13 RX ADMIN — FENTANYL CITRATE 50 MCG: 50 INJECTION, SOLUTION INTRAMUSCULAR; INTRAVENOUS at 14:09

## 2023-09-13 RX ADMIN — Medication 2 G: at 13:03

## 2023-09-13 RX ADMIN — BUPIVACAINE HYDROCHLORIDE 20 ML: 2.5 INJECTION, SOLUTION EPIDURAL; INFILTRATION; INTRACAUDAL at 12:35

## 2023-09-13 RX ADMIN — PHENYLEPHRINE HYDROCHLORIDE 100 MCG: 10 INJECTION INTRAVENOUS at 13:44

## 2023-09-13 RX ADMIN — BUPIVACAINE HYDROCHLORIDE 20 ML: 2.5 INJECTION, SOLUTION EPIDURAL; INFILTRATION; INTRACAUDAL at 12:45

## 2023-09-13 RX ADMIN — FENTANYL CITRATE 50 MCG: 50 INJECTION, SOLUTION INTRAMUSCULAR; INTRAVENOUS at 12:35

## 2023-09-13 RX ADMIN — Medication 50 MG: at 13:09

## 2023-09-13 RX ADMIN — MIDAZOLAM HYDROCHLORIDE 1 MG: 1 INJECTION, SOLUTION INTRAMUSCULAR; INTRAVENOUS at 12:35

## 2023-09-13 RX ADMIN — SUGAMMADEX 300 MG: 100 INJECTION, SOLUTION INTRAVENOUS at 14:18

## 2023-09-13 RX ADMIN — DEXAMETHASONE SODIUM PHOSPHATE 1 MG: 10 INJECTION, SOLUTION INTRAMUSCULAR; INTRAVENOUS at 12:45

## 2023-09-13 RX ADMIN — DEXAMETHASONE SODIUM PHOSPHATE 1 MG: 10 INJECTION, SOLUTION INTRAMUSCULAR; INTRAVENOUS at 12:35

## 2023-09-13 RX ADMIN — LIDOCAINE HYDROCHLORIDE 100 MG: 20 INJECTION, SOLUTION INFILTRATION; PERINEURAL at 13:09

## 2023-09-13 RX ADMIN — DEXMEDETOMIDINE HYDROCHLORIDE 20 MCG: 4 INJECTION, SOLUTION INTRAVENOUS at 12:45

## 2023-09-13 RX ADMIN — PROPOFOL 200 MG: 10 INJECTION, EMULSION INTRAVENOUS at 13:09

## 2023-09-13 RX ADMIN — FENTANYL CITRATE 50 MCG: 50 INJECTION, SOLUTION INTRAMUSCULAR; INTRAVENOUS at 13:09

## 2023-09-13 RX ADMIN — PHENYLEPHRINE HYDROCHLORIDE 100 MCG: 10 INJECTION INTRAVENOUS at 13:59

## 2023-09-13 ASSESSMENT — ENCOUNTER SYMPTOMS
SEIZURES: 0
ORTHOPNEA: 0

## 2023-09-13 ASSESSMENT — ACTIVITIES OF DAILY LIVING (ADL)
ADLS_ACUITY_SCORE: 37
ADLS_ACUITY_SCORE: 39
ADLS_ACUITY_SCORE: 35

## 2023-09-13 ASSESSMENT — COPD QUESTIONNAIRES: COPD: 0

## 2023-09-13 ASSESSMENT — LIFESTYLE VARIABLES: TOBACCO_USE: 0

## 2023-09-13 NOTE — ANESTHESIA CARE TRANSFER NOTE
Patient: Reilly Borja    Procedure: Procedure(s):  Left Talonavicular and Calcanecuboid Joint Fusion       Diagnosis: Pain in left foot [M79.672]  Diagnosis Additional Information: No value filed.    Anesthesia Type:   General     Note:    Oropharynx: oropharynx clear of all foreign objects and spontaneously breathing  Level of Consciousness: awake  Oxygen Supplementation: face mask  Level of Supplemental Oxygen (L/min / FiO2): 8  Independent Airway: airway patency satisfactory and stable  Dentition: dentition unchanged  Vital Signs Stable: post-procedure vital signs reviewed and stable  Report to RN Given: handoff report given  Patient transferred to: PACU    Handoff Report: Identifed the Patient, Identified the Reponsible Provider, Reviewed the pertinent medical history, Discussed the surgical course, Reviewed Intra-OP anesthesia mangement and issues during anesthesia, Set expectations for post-procedure period and Allowed opportunity for questions and acknowledgement of understanding      Vitals:  Vitals Value Taken Time   /75 09/13/23 1432   Temp 36.6    Pulse 72 09/13/23 1435   Resp 6 09/13/23 1435   SpO2 98 % 09/13/23 1435   Vitals shown include unvalidated device data.    Electronically Signed By: RAMIRO MAHER APRN CRNA  September 13, 2023  2:36 PM

## 2023-09-13 NOTE — PROGRESS NOTES
15 beat run of V Tach at 14:40, Dr. Duque aware and at bedside to assess pt. Pt asymptomatic, BP stable. Per MD- will keep in PACU for an hour, anesthesia will assess again prior to sign out.

## 2023-09-13 NOTE — ANESTHESIA PROCEDURE NOTES
Sciatic Procedure Note    Pre-Procedure   Staff -        Anesthesiologist:  Ramon Macias DO       Resident/Fellow: Abdi Vela MD       Performed By: fellow       Location: pre-op       Procedure Start/Stop Times: 9/13/2023 12:45 PM and 9/13/2023 12:55 PM       Pre-Anesthestic Checklist: patient identified, IV checked, site marked, risks and benefits discussed, informed consent, monitors and equipment checked, pre-op evaluation, at physician/surgeon's request and post-op pain management  Timeout:       Correct Patient: Yes        Correct Procedure: Yes        Correct Site: Yes        Correct Position: Yes        Correct Laterality: Yes        Site Marked: Yes  Procedure Documentation  Procedure: Sciatic       Diagnosis: POST OPERATIVE PAIN CONTROL       Laterality: left       Patient Position: supine       Patient Prep/Sterile Barriers: sterile gloves, mask       Skin prep: Chloraprep (popliteal approach).       Needle Type: insulated       Needle Gauge: 21.        Needle Length (Inches): 4        Ultrasound guided       1. Ultrasound was used to identify targeted nerve, plexus, vascular marker, or fascial plane and place a needle adjacent to it in real-time.       2. Ultrasound was used to visualize the spread of anesthetic in close proximity to the above referenced structure.       3. A permanent image is entered into the patient's record.    Assessment/Narrative         The placement was negative for: blood aspirated, painful injection and site bleeding       Paresthesias: No.       Bolus given via needle. no blood aspirated via catheter.        Secured via tunneling.        Insertion/Infusion Method: Single Shot       Complications: none    Medication(s) Administered   Bupivacaine 0.25% PF (Infiltration) - Infiltration   20 mL - 9/13/2023 12:45:00 PM  Dexmedetomidine 4 mcg/mL (Perineural) - Perineural   20 mcg - 9/13/2023 12:45:00 PM  Dexamethasone 10 mg/mL PF (Perineural) - Perineural   1 mg - 9/13/2023  "12:45:00 PM  Medication Administration Time: 9/13/2023 12:45 PM      FOR [facility] ([facility]) ONLY:   Pain Team Contact information: please page the Pain Team Via [employer]. Search \"Pain\". During daytime hours, please page the attending first. At night please page the resident first.      "

## 2023-09-13 NOTE — ANESTHESIA PROCEDURE NOTES
Adductor canal Procedure Note    Pre-Procedure   Staff -        Anesthesiologist:  Ramon Macias DO       Resident/Fellow: Abdi Vela MD       Performed By: fellow       Location: pre-op       Procedure Start/Stop Times: 9/13/2023 12:35 PM and 9/13/2023 12:45 PM       Pre-Anesthestic Checklist: patient identified, IV checked, site marked, risks and benefits discussed, informed consent, monitors and equipment checked, pre-op evaluation, at physician/surgeon's request and post-op pain management  Timeout:       Correct Patient: Yes        Correct Procedure: Yes        Correct Site: Yes        Correct Position: Yes        Correct Laterality: Yes        Site Marked: Yes  Procedure Documentation  Procedure: Adductor canal       Diagnosis: POST OPERATIVE PAIN CONTROL       Laterality: left       Patient Position: supine       Patient Prep/Sterile Barriers: sterile gloves, mask       Skin prep: Chloraprep       Needle Type: insulated       Needle Gauge: 21.        Needle Length (Inches): 4        Ultrasound guided       1. Ultrasound was used to identify targeted nerve, plexus, vascular marker, or fascial plane and place a needle adjacent to it in real-time.       2. Ultrasound was used to visualize the spread of anesthetic in close proximity to the above referenced structure.       3. A permanent image is entered into the patient's record.    Assessment/Narrative         The placement was negative for: blood aspirated, painful injection and site bleeding       Paresthesias: No.       Bolus given via needle. no blood aspirated via catheter.        Secured via.        Insertion/Infusion Method: Single Shot       Complications: none    Medication(s) Administered   Bupivacaine 0.25% PF (Infiltration) - Infiltration   20 mL - 9/13/2023 12:35:00 PM  Dexmedetomidine 4 mcg/mL (Perineural) - Perineural   20 mcg - 9/13/2023 12:35:00 PM  Dexamethasone 10 mg/mL PF (Perineural) - Perineural   1 mg - 9/13/2023 12:35:00  "PM  Medication Administration Time: 9/13/2023 12:35 PM      FOR [facility] ([facility]) ONLY:   Pain Team Contact information: please page the Pain Team Via [employer]. Search \"Pain\". During daytime hours, please page the attending first. At night please page the resident first.      "

## 2023-09-13 NOTE — BRIEF OP NOTE
[facility] Brief Operative Note    Pre-operative diagnosis: Pain in left foot [M79.672]   Post-operative diagnosis Left foot pain   Procedure: Procedure(s):  Left Talonavicular and Calcanecuboid Joint Fusion   Surgeon(s): Surgeon(s) and Role:     * Orlando Duarte MD - Primary     * Lisette Tony PA-C - Assisting   Estimated blood loss: 20 cc    Specimens: * No specimens in log *   Findings: See post op report       Plan:  Same Day surgery discharge to home once criteria met.  Splint to remain on left  lower extremity and NWB at all times.  Oxycodone for pain.  No dressing change on own.  Leave dressing on until 1 week follow up appointment.  F/U in clinic in 1 week    I was asked by Dr. Duarte to assist with surgery. I positioned and prepped the patient. I retracted soft tissue.   I suctioned fluids when needed. I provided traction for dissection. I helped to ligate blood vessels. I helped Dr. Duarte identify and protect important structures. The procedure was medically necessary for an assistant because Dr. Duarte needed the operative exposure and assistance that I provided. This allowed him to safely and efficiently operate. It was also important that I help ligate blood vessels to maintain hemostasis and reduce the bleeding risk. I helped with the closure of the operative incisions as well as helping with the boot/cast/splint.  The assistance that I provided reduced operative time which meant less general anesthetic for the patient. No qualified residents were available to assist.    Lisette Tony PA-C

## 2023-09-13 NOTE — ANESTHESIA PREPROCEDURE EVALUATION
Anesthesia Pre-Procedure Evaluation    Patient: Reilly Borja   MRN: [id number] : 1950        Procedure : Procedure(s):  Left Talonavicular and Calcanecuboid Joint Fusion          Past Medical History:   Diagnosis Date    Congestive heart failure (H)     Diabetes (H)     Hypertension     Iron deficiency anemia secondary to inadequate dietary iron intake       Past Surgical History:   Procedure Laterality Date    ARTHROSCOPY KNEE Left     IRRIGATION AND DEBRIDEMENT LOWER EXTREMITY, COMBINED Left 2021    Procedure: IRRIGATION AND DEBRIDEMENT  WITH BONE BIOPSY LEFT FOOT;  Surgeon: Julius Campbell DO;  Location: [facility] OR    OTHER SURGICAL HISTORY Left     surgery for charcot of left foot    REMOVE HARDWARE LOWER EXTREMITY Left 2021    Procedure: REMOVAL OF HARDWARE LEFT FOOT DEEP;  Surgeon: Julius Campbell DO;  Location: [facility] LUMBAR SPINE FUSN,POST INTRBDY Right 2017    Procedure: RIGHT L5-S1 TRANSFORAMINAL LUMBAR INTERBODY FUSION;  Surgeon: Rajiv Zavala MD;  Location: [facility];  Service: Spine      Allergies   Allergen Reactions    Simvastatin Muscle Pain (Myalgia)     Patient states was never an allergy, would like it removed from his chart please      Social History     Tobacco Use    Smoking status: Never     Passive exposure: Never    Smokeless tobacco: Never   Substance Use Topics    Alcohol use: Not Currently      Wt Readings from Last 1 Encounters:   23 111.6 kg (246 lb 0.5 oz)        Anesthesia Evaluation   Pt has had prior anesthetic. Type: General.    No history of anesthetic complications       ROS/MED HX  ENT/Pulmonary:     (+)     WILNER risk factors,  hypertension,                             (-) tobacco use, asthma, COPD, sleep apnea and recent URI   Neurologic:  - neg neurologic ROS  (-) no seizures and no CVA   Cardiovascular: Comment: Interpretation Summary  Normal, low-risk dobutamine echocardiogram.      The target heart rate was achieved. Normal heart rate response to dobutamine.  BP response was normal.  Baseline LVEF is borderline low at 50-55%. RV size and function is normal.  With dobutamine, LVEF increased to >70% and LV cavity size decreased  appropriately.  No regional wall motion abnormalities are present at rest or with dobutamine.  No angina was elicited.  No ECG evidence of ischemia. Frequent PAC/PVC during drug infusion.  No significant valvular abnormalities are noted on screening Doppler exam.  The aortic root and visualized ascending aorta are benita  From 9/23    (+) Dyslipidemia hypertension- -   -  - -      CHF  Last EF: 25-30%                         Previous cardiac testing   Echo: Date: Results:    Stress Test:  Date: Results:    ECG Reviewed:  Date: 9/16/21 Results:  SR  Cath:  Date: Results:   (-) orthopnea/PND   METS/Exercise Tolerance: 1 - Eating, dressing Comment: - Cannot walk 1/2 city block due to pain in left foot.  - Denies CP with exertion but does have mild SOB with exertion. Denies LE edema, orthopnea, or PND.    Hematologic:     (+)      anemia,       (-) history of blood clots and history of blood transfusion   Musculoskeletal: Comment: - Pain in left foot  - s/p spinal surgery  - Gout, no recent flares      GI/Hepatic:     (+)             liver disease (NAFLD),    (-) GERD   Renal/Genitourinary:    (-) renal disease and nephrolithiasis   Endo:     (+)  type II DM, Last HgA1c: 6.6, date: 8/29/23, Not using insulin,  Normal glucose range: Does not check at home, not previously admitted for DM/DKA.           (-) thyroid disease and chronic steroid usage   Psychiatric/Substance Use:  - neg psychiatric ROS     Infectious Disease:  - neg infectious disease ROS     Malignancy:  - neg malignancy ROS     Other:            Physical Exam    Airway        Mallampati: II   TM distance: > 3 FB   Neck ROM: full   Mouth opening: > 3 cm    Respiratory Devices and Support         Dental       (+)  Minor Abnormalities - some fillings, tiny chips      Cardiovascular   cardiovascular exam normal          Pulmonary   pulmonary exam normal                OUTSIDE LABS:  CBC:   Lab Results   Component Value Date    WBC 6.9 07/21/2023    WBC 7.1 08/30/2022    HGB 12.6 (L) 07/21/2023    HGB 12.9 (L) 08/30/2022    HCT 41.2 07/21/2023    HCT 40.0 08/30/2022     07/21/2023     08/30/2022     BMP:   Lab Results   Component Value Date     07/21/2023     (L) 09/18/2021    POTASSIUM 3.7 07/21/2023    POTASSIUM 3.6 10/13/2021    CHLORIDE 96 (L) 07/21/2023    CHLORIDE 96 (L) 09/18/2021    CO2 29 07/21/2023    CO2 31 09/18/2021    BUN 18.8 07/21/2023    BUN 16 11/03/2021    CR 0.98 07/21/2023    CR 0.75 11/03/2021     (H) 09/13/2023     (H) 07/21/2023     COAGS: No results found for: PTT, INR, FIBR  POC: No results found for: BGM, HCG, HCGS  HEPATIC:   Lab Results   Component Value Date    ALBUMIN 3.7 07/21/2023    PROTTOTAL 8.2 07/21/2023    ALT 12 07/21/2023    AST 21 07/21/2023    ALKPHOS 222 (H) 07/21/2023    BILITOTAL 0.3 07/21/2023     OTHER:   Lab Results   Component Value Date    A1C 8.0 (H) 07/21/2023    ISSA 9.0 07/21/2023    MAG 1.7 (L) 09/20/2021    TSH 1.70 11/16/2019    CRP 0.8 (H) 12/02/2021    SED 34 (H) 08/30/2022       Anesthesia Plan    ASA Status:  3       Anesthesia Type: General.     - Airway: ETT   Induction: Intravenous.   Maintenance: Balanced.        Consents    Anesthesia Plan(s) and associated risks, benefits, and realistic alternatives discussed. Questions answered and patient/representative(s) expressed understanding.     - Discussed:     - Discussed with:  Patient      - Extended Intubation/Ventilatory Support Discussed: No.      - Patient is DNR/DNI Status: No     Use of blood products discussed: No .     Postoperative Care    Pain management: IV analgesics, Peripheral nerve block (Single Shot), Multi-modal analgesia.   PONV prophylaxis: Ondansetron (or  other 5HT-3), Dexamethasone or Solumedrol     Comments:                OFE DOLL MD

## 2023-09-13 NOTE — ANESTHESIA PROCEDURE NOTES
Airway         Procedure Start/Stop Times: 9/13/2023 1:11 PM  Staff -        CRNA: Marya Arias APRN CRNA       Performed By: CRNA  Consent for Airway        Urgency: elective  Indications and Patient Condition       Indications for airway management: danni-procedural       Induction type:intravenous       Mask difficulty assessment: 2 - vent by mask + OA or adjuvant +/- NMBA    Final Airway Details       Final airway type: endotracheal airway       Successful airway: ETT - single  Endotracheal Airway Details        ETT size (mm): 8.0       Successful intubation technique: direct laryngoscopy       DL Blade Type: Dean 2       Grade View of Cords: 1       Adjucts: stylet       Position: Right       Secured at (cm): 23       Bite block used: None    Post intubation assessment        Placement verified by: capnometry and equal breath sounds        Number of attempts at approach: 1       Number of other approaches attempted: 0       Secured with: silk tape       Ease of procedure: easy    Medication(s) Administered   Medication Administration Time: 9/13/2023 1:11 PM

## 2023-09-13 NOTE — DISCHARGE INSTRUCTIONS

## 2023-09-14 ENCOUNTER — TELEPHONE (OUTPATIENT)
Dept: ORTHOPEDICS | Facility: CLINIC | Age: 73
End: 2023-09-14

## 2023-09-14 NOTE — TELEPHONE ENCOUNTER
I spoke with Reilly and let him know that the type of surgery he had done yesterday is going to cause a lot of bleeding and that I want him to come in Monday for a splint change and cast application. Reilly says he can already see blood coming through his splint, so I will have him come in tomorrow. Appointment was made for 3pm. With consulting my RN, Reilly and I also discussed pain medication instructions. He states he does not yet feel any pain.  Mell Cobian ATC

## 2023-09-14 NOTE — OP NOTE
Date of surgery: 9/14/2023      Preoperative diagnosis: Left talonavicular and calcaneocuboid joint arthritis     Postoperative diagnosis: Left talonavicular and calcaneocuboid joint arthritis.  Left talus fracture     Procedure: Left talus partial excision.  Left talonavicular and calcaneocuboid joint arthrodesis     Surgeons: Orlando Duarte MD     Assistants: SAHRA Santana PA-C     Complications: None     Drains: None     EBL: Less than 20 cc     Anesthesia: General endotracheal     Indications: Please refer to clinic note for further details     Procedure note: On 9/14/2023 patient was taken to surgery.  Preoperative antibiotics were administered to the patient prior to arrival to the OR     After successful induction of general endotracheal anesthesia he  was placed supine on the table.  The left lower extremity was prepped and draped in sterile fashion.  After exsanguination by gravity, tourniquet was inflated to 300 mmHg on the proximal third of the left thigh.      The pause for the cause was performed according to institutional policy which confirmed laterality of the procedure.     An incision was made on the previous surgical incision medially.  Today we presented occasional talonavicular joint which was quite difficult to do given the tremendous collapse of the talus and the distorted anatomy.  Eventually proceed with a debridement of the area.  Patient presented with a fair amount of gaping in between the talus and the navicular therefore we will proceed with packing of morselized croutons after multiple perforations with a 2.5 mm drill bit to expose cancellous bone.    Another incision was made on the lateral aspect of the foot at the level of the calcaneocuboid joint.  Present discussion of the calcaneocuboid joint as well as the sinus Tarsi.  Within the sinus Tarsi we encountered a large amount of fragments from the talus which were extracted.  This helped to avoid any  potential subtalar joint impingement.  The joint was prepared by resecting the cartilaginous surfaces for bleeding subchondral bone exposed with a 2.5 mm drill bit and we proceeded with further packing with morselized croutons.    We proceeded then with reduction of both joints and placement of a two 4.0 millimeter screws from plantar medial to dorsal and lateral for the talonavicular joint and from proximal and dorsal to distal and plantar for the calcaneocuboid joint.  Each joint required 2 screws.    We confirmed under fluoroscopic examination on 3 views of the left foot to have excellent reduction of the joints and location of the hardware.  These images were sent to PACS for definitive documentation     Tourniquet Was deflated.  Satisfactory hemostasis accomplished.  Wound was closed in layers.  Sterile dressings were applied.  Patient was placed in a plaster splint and transferred in stable condition to PACU.      Plan: Patient will remain nonweightbearing x3 or 4 months.  He will return to clinic in 2 weeks for a wound check at that time sutures were removed indicated.  He will be placed in a short leg cast in neutral alignment and return to clinic at 6 weeks from surgery at that time 3 views of the left foot will be obtained.    Patient will be on a strict nonweightbearing until further notice.  Patient will not pursue any physical therapy during the early portion of the recovery.    Orlando Duarte MD

## 2023-09-15 ENCOUNTER — APPOINTMENT (OUTPATIENT)
Dept: ORTHOPEDICS | Facility: CLINIC | Age: 73
End: 2023-09-15

## 2023-09-15 DIAGNOSIS — M79.672 PAIN IN LEFT FOOT: ICD-10-CM

## 2023-09-15 RX ORDER — OXYCODONE HYDROCHLORIDE 5 MG/1
5-10 TABLET ORAL EVERY 4 HOURS PRN
Qty: 12 TABLET | Refills: 0 | Status: SHIPPED | OUTPATIENT
Start: 2023-09-15

## 2023-09-25 ENCOUNTER — OFFICE VISIT (OUTPATIENT)
Dept: ORTHOPEDICS | Facility: CLINIC | Age: 73
End: 2023-09-25
Payer: COMMERCIAL

## 2023-09-25 DIAGNOSIS — M79.672 PAIN IN LEFT FOOT: ICD-10-CM

## 2023-09-25 DIAGNOSIS — Z98.890 STATUS POST FOOT SURGERY: Primary | ICD-10-CM

## 2023-09-25 PROCEDURE — 29405 APPL SHORT LEG CAST: CPT | Mod: 58

## 2023-09-25 NOTE — PROGRESS NOTES
"Reason for visit:    Reilly oBrja came in to the clinic for a two week post op check.    His surgery was done 9/13/2023 by Dr Duarte.  He had a Left talonavicular and calcaecuboid joint fusion.     Assessment:    Reilly came into the clinic in a short leg cast in a wheelchair Non-WB, accompanied by his wife.    The Surgical wounds were exposed and found to be well-healed; so the sutures were removed. Skin was c/d/I. Steri strips were applied. Also sterile guaze over the incisions.   There was one drop of clear drainage on the medial incision, but nothing more. Reilly was educated on the symptoms of infection, but claims to have none at the moment. He should alert us right away if anything changes. He states he has had \"pretty much no pain\" since surgery.    Plan:     He was placed into a new cast.  He was told to remain Non-WB     He has an appointment to see Dr. Duarte at 6 weeks post op and at that time Dr. Duarte will determine further restrictions.    All questions were answered. He has our phone number and will call with additional questions or problems.    Mell Luciano PA-C is the overseeing provider on site today.      Cast/splint application    Date/Time: 9/25/2023 2:03 PM    Performed by: Mell Cobian ATC  Authorized by: Orlando Duarte MD    Consent:     Consent obtained:  Verbal    Consent given by:  Patient  Pre-procedure details:     Sensation:  Normal  Procedure details:     Laterality:  Left    Location:  Ankle    Ankle:  L ankle    Cast type:  Short leg    Supplies:  Fiberglass  Post-procedure details:     Pain:  Unchanged    Sensation:  Normal    Patient tolerance of procedure:  Tolerated well, no immediate complications    Patient provided with cast or splint care instructions: Yes        "

## 2023-10-09 ENCOUNTER — TELEPHONE (OUTPATIENT)
Dept: ORTHOPEDICS | Facility: CLINIC | Age: 73
End: 2023-10-09

## 2023-10-09 NOTE — TELEPHONE ENCOUNTER
[facility] Call Center    Phone Message    May a detailed message be left on voicemail: yes     Reason for Call: Other: Patient calling back regarding bleeding and swelling. Wanting to speak with Dr. Duarte's care team      Action Taken: Message routed to:  [facility]    Travel Screening: Not Applicable

## 2023-10-09 NOTE — TELEPHONE ENCOUNTER
"I called the patient and asked him to send us a picture via [employer]. He said he can't bend his knee to get a good look at it and his wife won't take a photo because \"looking at it makes her want to puke.\" He states the cast edge was rubbing on his 5th toe causing bleeding, but it has stopped and scabbed over now. I asked him to pad the edge of the cast until we can get him in for a cast change. And also to cover the wound with a bandage.  I then asked him to come in for a cast change tomorrow, he states he cannot get a ride tomorrow.   We settled on an appointment for 8:30am Wednesday morning.   He should call back immediately if he develops any more bleeding, oozing, redness, nausea, fever or chills.  Mell Cobian ATC  "

## 2023-10-09 NOTE — TELEPHONE ENCOUNTER
[facility] Call Center    Phone Message    May a detailed message be left on voicemail: yes     Reason for Call: Other: Left little toe was rubbing against the fiber glass of the cast and caused it to bleed. It was bleeding a couple days ago. It has stopped bleeding but it is also swollen. Patient is requesting a call back.     Action Taken: Message routed to:  [facility] Orthopedics    Travel Screening: Not Applicable

## 2023-10-11 ENCOUNTER — TELEPHONE (OUTPATIENT)
Dept: ORTHOPEDICS | Facility: CLINIC | Age: 73
End: 2023-10-11

## 2023-10-11 ENCOUNTER — OFFICE VISIT (OUTPATIENT)
Dept: ORTHOPEDICS | Facility: CLINIC | Age: 73
End: 2023-10-11
Payer: MEDICARE

## 2023-10-11 DIAGNOSIS — Z98.890 STATUS POST FOOT SURGERY: Primary | ICD-10-CM

## 2023-10-11 PROCEDURE — 29405 APPL SHORT LEG CAST: CPT | Mod: 58

## 2023-10-11 PROCEDURE — 99024 POSTOP FOLLOW-UP VISIT: CPT

## 2023-10-23 DIAGNOSIS — Z98.890 STATUS POST FOOT SURGERY: Primary | ICD-10-CM

## 2023-10-24 ENCOUNTER — OFFICE VISIT (OUTPATIENT)
Dept: ORTHOPEDICS | Facility: CLINIC | Age: 73
End: 2023-10-24
Payer: MEDICARE

## 2023-10-24 ENCOUNTER — ANCILLARY PROCEDURE (OUTPATIENT)
Dept: GENERAL RADIOLOGY | Facility: CLINIC | Age: 73
End: 2023-10-24
Attending: ORTHOPAEDIC SURGERY
Payer: MEDICARE

## 2023-10-24 DIAGNOSIS — M25.572 PAIN IN JOINT, ANKLE AND FOOT, LEFT: Primary | ICD-10-CM

## 2023-10-24 DIAGNOSIS — Z98.890 STATUS POST FOOT SURGERY: ICD-10-CM

## 2023-10-24 PROCEDURE — 73630 X-RAY EXAM OF FOOT: CPT | Mod: LT | Performed by: RADIOLOGY

## 2023-10-24 PROCEDURE — 99024 POSTOP FOLLOW-UP VISIT: CPT | Performed by: ORTHOPAEDIC SURGERY

## 2023-10-24 NOTE — LETTER
10/24/2023         RE: Reilly Borja  [address] [address]        Dear Colleague,    Thank you for referring your patient, Reilly Borja, to the [facility] [place] [facility]. Please see a copy of my visit note below.    Chief complaint: Status post left talonavicular calcaneocuboid joint arthrodesis performed on September 14, 2023    Patient presents in the company of his wife for further evaluation reports to be compliant denies to have any problems.    Into the visit he presented with well-healed surgical incisions and excellent alignment of the foot.  Unfortunately he presented with some trauma and abrasions to the fifth toe which he cannot explain why or how these 2 can place.    Plain x-rays of the foot are significant for showing what seems to be some excellent consolidation across the fusion sites alignment it is excellent.  There is significant collapse of the talus itself.    Assessment: Status post left talonavicular and calcaneocuboid joint arthrodesis    Plan: I discussed with patient and his wife to remain nonweightbearing for another 6 weeks.  Into the visit he will be placed in a short leg cast where we will leave plenty of room for local wound care to the fifth toe.  Were hoping that he will be able to be managed by Dr. Mcpherson with regards to the management of the toe    He will return to clinic in 6 weeks from now and at that time a CT scan of the left foot will be obtained prior to the appointment therefore he will not require plain x-rays.    All questions were answered.      Orlando Duarte MD

## 2023-10-24 NOTE — NURSING NOTE
Reason For Visit:   Chief Complaint   Patient presents with    RECHECK     6 weeks s/p Left talus partial excision. Left talonavicular and calcaneocuboid joint arthrodesis. XR today. Has been partial WB in cast. Was seen a few weeks ago for cast change and we discovered a wound on small toe, cast had been rubbing. He was told to treat daily with Iodosorb ointment.       There were no vitals taken for this visit.         Mell Cobian, ATC

## 2023-10-24 NOTE — PROGRESS NOTES
Reilly presents today for a post op cast change. He is expected to have a lot of post operative bleeding. The cast was removed, there is a moderate amount of dried blood. No active bleeding. No sign of infection. Reilly states he is doing really well.  A new cast was applied, Reilly states the new cast is comfortable.  Cast/splint application    Date/Time: 10/11/2023 11:11 AM    Performed by: Mell Cobian ATC  Authorized by: Orlando Duarte MD    Consent:     Consent obtained:  Verbal    Consent given by:  Patient  Pre-procedure details:     Sensation:  Normal  Procedure details:     Laterality:  Left    Location:  Foot    Foot:  L foot    Cast type:  Short leg    Supplies:  Fiberglass  Post-procedure details:     Pain:  Unchanged    Sensation:  Normal    Patient tolerance of procedure:  Tolerated well, no immediate complications    Patient provided with cast or splint care instructions: Yes

## 2023-10-24 NOTE — PROGRESS NOTES
Chief complaint: Status post left talonavicular calcaneocuboid joint arthrodesis performed on September 14, 2023    Patient presents in the company of his wife for further evaluation reports to be compliant denies to have any problems.    Into the visit he presented with well-healed surgical incisions and excellent alignment of the foot.  Unfortunately he presented with some trauma and abrasions to the fifth toe which he cannot explain why or how these 2 can place.    Plain x-rays of the foot are significant for showing what seems to be some excellent consolidation across the fusion sites alignment it is excellent.  There is significant collapse of the talus itself.    Assessment: Status post left talonavicular and calcaneocuboid joint arthrodesis    Plan: I discussed with patient and his wife to remain nonweightbearing for another 6 weeks.  Into the visit he will be placed in a short leg cast where we will leave plenty of room for local wound care to the fifth toe.  Were hoping that he will be able to be managed by Dr. Mcpherson with regards to the management of the toe    He will return to clinic in 6 weeks from now and at that time a CT scan of the left foot will be obtained prior to the appointment therefore he will not require plain x-rays.    All questions were answered.

## 2023-10-25 NOTE — TELEPHONE ENCOUNTER
DIAGNOSIS: Ortega patient wound from cast    APPOINTMENT DATE: 11/3/23   NOTES STATUS DETAILS   OFFICE NOTE from referring provider Internal 10/24/23 OV STEPH ORTEGA MD - PAIN IN JOINT, ANKLE AND FOOT LEFT    DISCHARGE REPORT from the ER Internal 9/13/23 ED STEPH ORTEGA MD - PAIN IN LEFT FOOT    OPERATIVE REPORT Internal 9/17/21 [facility] ANDERSON MD - REMOVAL OF HARDWARE LEFT FOOT   9/13/23 STEPH ORTEGA, - LEFT TALONAVICULAR AND CALCANE CUBOID JOINT FUSION   MEDICATION LIST Internal    LABS     INJECTIONS DONE IN RADIOLOGY Internal 9/27/22 [facility] INJ LEFT    CT SCAN [facility] 6/5/19 CT FOOT LEFT   3/8/22 CT ANKLE LEFT   3/8/22 CT FOOT LEFT    XRAYS (IMAGES & REPORTS) [facility]    INTERNAL 6/29/19 XR FOOT LEFT   4/26/22 XR FOOT LEFT   4/26/22 XR ANKLE LEFT     8/30/22 XR FOOT LEFT   3/21/23 XR FOOT LEFT   10/24/23 XR FOOT LEFT      Records Requested     October 25, 2023 8:48 AM   [id number]   Facility  [facility] PHONE- [phone] [phone]   Outcome REQUESTED ON 10/25

## 2023-11-03 ENCOUNTER — OFFICE VISIT (OUTPATIENT)
Dept: ORTHOPEDICS | Facility: CLINIC | Age: 73
End: 2023-11-03
Payer: COMMERCIAL

## 2023-11-03 ENCOUNTER — PRE VISIT (OUTPATIENT)
Dept: ORTHOPEDICS | Facility: CLINIC | Age: 73
End: 2023-11-03

## 2023-11-03 DIAGNOSIS — L97.522 ULCER OF LEFT FOOT WITH FAT LAYER EXPOSED (H): ICD-10-CM

## 2023-11-03 DIAGNOSIS — R23.4 ESCHAR OF TOE: Primary | ICD-10-CM

## 2023-11-03 PROCEDURE — 99203 OFFICE O/P NEW LOW 30 MIN: CPT | Performed by: PODIATRIST

## 2023-11-03 NOTE — PROGRESS NOTES
Date of Service: 11/3/2023    Chief Complaint:   Chief Complaint   Patient presents with    Consult     5th toe.         HPI: Reilly is a 73 year old male who presents today for further evaluation of wounds on the left foot.m he had a left talonavicular and calcaneocuboid joint fusion on 13 September.  He has been a cast since then.  Since the last couple weeks, he has noted a couple wounds on his foot.  One is at the top of the foot.  One is on the fifth digit.  He relates he is not weightbearing on the foot.    Review of Systems: No nausea, vomiting, diarrhea, fever, chills, night sweats, shortness of breath, chest pain.    PMH:   Past Medical History:   Diagnosis Date    Congestive heart failure (H)     Diabetes (H)     Hypertension     Iron deficiency anemia secondary to inadequate dietary iron intake        PSxH:   Past Surgical History:   Procedure Laterality Date    ARTHRODESIS FOOT Left 9/13/2023    Procedure: Left Talonavicular and Calcanecuboid Joint Fusion;  Surgeon: Orlando Duarte MD;  Location: [facility] OR    ARTHROSCOPY KNEE Left     IRRIGATION AND DEBRIDEMENT LOWER EXTREMITY, COMBINED Left 9/17/2021    Procedure: IRRIGATION AND DEBRIDEMENT  WITH BONE BIOPSY LEFT FOOT;  Surgeon: Julius Campbell DO;  Location: [facility] OR    OTHER SURGICAL HISTORY Left     surgery for charcot of left foot    REMOVE HARDWARE LOWER EXTREMITY Left 9/17/2021    Procedure: REMOVAL OF HARDWARE LEFT FOOT DEEP;  Surgeon: Julius Campbell DO;  Location: [facility] LUMBAR SPINE FUSN,POST INTRBDY Right 02/06/2017    Procedure: RIGHT L5-S1 TRANSFORAMINAL LUMBAR INTERBODY FUSION;  Surgeon: Rajiv Zavala MD;  Location: [facility];  Service: Spine       Allergies: Simvastatin    SH:   Social History     Socioeconomic History    Marital status: Single     Spouse name: Not on file    Number of children: Not on file    Years of education: Not on file    Highest education level: Not  on file   Occupational History    Not on file   Tobacco Use    Smoking status: Never     Passive exposure: Never    Smokeless tobacco: Never   Vaping Use    Vaping Use: Never used   Substance and Sexual Activity    Alcohol use: Not Currently    Drug use: Never    Sexual activity: Yes     Partners: Female   Other Topics Concern    Parent/sibling w/ CABG, MI or angioplasty before 65F 55M? Not Asked   Social History Narrative    Not on file     Social Determinants of Health     Financial Resource Strain: Not on file   Food Insecurity: Not on file   Transportation Needs: Not on file   Physical Activity: Not on file   Stress: Not on file   Social Connections: Not on file   Interpersonal Safety: Not on file   Housing Stability: Not on file       FH:   Family History   Problem Relation Age of Onset    Venous thrombosis Mother     Diabetes Mother     Anesthesia Reaction No family hx of        Objective:  Data Unavailable Data Unavailable Data Unavailable Data Unavailable Data Unavailable 0 lbs 0 oz    PT and DP pulses are 2/4 bilaterally. CRT is 1 second.  Diminished pedal hair.   Gross sensation is diminished bilaterally.   Equinus is noted bilaterally. No pain with active or passive ROM of the ankle, MTJ, 1st ray, or halluces bilaterally,.         There is an eschar noted to the dorsal fifth toe and the plantar lateral fifth toe on the left side.  No signs or symptoms of infection.  There is a dorsal left foot wound.  This is as described:  A wound is noted at left  dorsal foot measuring 1cm x 1cm x 0.1cm.    Stone Classification: 2    Wound base: Pink/Granulation    Edges: crusting    Drainage: scant/serous    Odor: no    Undermining: no    Bone Exposure: No    Clinical Signs of Infection: No    After obtaining patient consent, the wound was irrigated with copious amounts of saline.       Assessment:   Encounter Diagnoses   Name Primary?    Eschar of toe Yes    Ulcer of left foot with fat layer exposed (H)           Plan:  - Pt seen and evaluated.  -Recommended paint iodine to the toe.  He can use Iodosorb and Mepilex to the dorsal foot wound.  -For the last visit, he was going to be transition to a boot.  I will do this transition today so we can do daily dressing changes.  -Continue follow-up with Dr. Dixon.

## 2023-11-03 NOTE — LETTER
11/3/2023         RE: Reilly Borja  [address] [address]        Dear Colleague,    Thank you for referring your patient, Reilly Borja, to the [facility] [place]. Please see a copy of my visit note below.    Date of Service: 11/3/2023    Chief Complaint:   Chief Complaint   Patient presents with    Consult     5th toe.         HPI: eRilly is a 73 year old male who presents today for further evaluation of wounds on the left foot.m he had a left talonavicular and calcaneocuboid joint fusion on 13 September.  He has been a cast since then.  Since the last couple weeks, he has noted a couple wounds on his foot.  One is at the top of the foot.  One is on the fifth digit.  He relates he is not weightbearing on the foot.    Review of Systems: No nausea, vomiting, diarrhea, fever, chills, night sweats, shortness of breath, chest pain.    PMH:   Past Medical History:   Diagnosis Date    Congestive heart failure (H)     Diabetes (H)     Hypertension     Iron deficiency anemia secondary to inadequate dietary iron intake        PSxH:   Past Surgical History:   Procedure Laterality Date    ARTHRODESIS FOOT Left 9/13/2023    Procedure: Left Talonavicular and Calcanecuboid Joint Fusion;  Surgeon: Orlando Duarte MD;  [facility]    ARTHROSCOPY KNEE Left     IRRIGATION AND DEBRIDEMENT LOWER EXTREMITY, COMBINED Left 9/17/2021    Procedure: IRRIGATION AND DEBRIDEMENT  WITH BONE BIOPSY LEFT FOOT;  Surgeon: Julius Campbell DO;  Location: [facility] OR    OTHER SURGICAL HISTORY Left     surgery for charcot of left foot    REMOVE HARDWARE LOWER EXTREMITY Left 9/17/2021    Procedure: REMOVAL OF HARDWARE LEFT FOOT DEEP;  Surgeon: Julius Campbell DO;  Location: [facility] OR    [facility] LUMBAR SPINE FUSN,POST INTRBDY Right 02/06/2017    Procedure: RIGHT L5-S1 TRANSFORAMINAL LUMBAR INTERBODY FUSION;  Surgeon: Rajiv Zavala MD;   Location: [facility];  Service: Spine       Allergies: Simvastatin    SH:   Social History     Socioeconomic History    Marital status: Single     Spouse name: Not on file    Number of children: Not on file    Years of education: Not on file    Highest education level: Not on file   Occupational History    Not on file   Tobacco Use    Smoking status: Never     Passive exposure: Never    Smokeless tobacco: Never   Vaping Use    Vaping Use: Never used   Substance and Sexual Activity    Alcohol use: Not Currently    Drug use: Never    Sexual activity: Yes     Partners: Female   Other Topics Concern    Parent/sibling w/ CABG, MI or angioplasty before 65F 55M? Not Asked   Social History Narrative    Not on file     Social Determinants of Health     Financial Resource Strain: Not on file   Food Insecurity: Not on file   Transportation Needs: Not on file   Physical Activity: Not on file   Stress: Not on file   Social Connections: Not on file   Interpersonal Safety: Not on file   Housing Stability: Not on file       FH:   Family History   Problem Relation Age of Onset    Venous thrombosis Mother     Diabetes Mother     Anesthesia Reaction No family hx of        Objective:  Data Unavailable Data Unavailable Data Unavailable Data Unavailable Data Unavailable 0 lbs 0 oz    PT and DP pulses are 2/4 bilaterally. CRT is 1 second.  Diminished pedal hair.   Gross sensation is diminished bilaterally.   Equinus is noted bilaterally. No pain with active or passive ROM of the ankle, MTJ, 1st ray, or halluces bilaterally,.         There is an eschar noted to the dorsal fifth toe and the plantar lateral fifth toe on the left side.  No signs or symptoms of infection.  There is a dorsal left foot wound.  This is as described:  A wound is noted at left  dorsal foot measuring 1cm x 1cm x 0.1cm.    Stone Classification: 2    Wound base: Pink/Granulation    Edges: crusting    Drainage: scant/serous    Odor: no    Undermining:  no    Bone Exposure: No    Clinical Signs of Infection: No    After obtaining patient consent, the wound was irrigated with copious amounts of saline.       Assessment:   Encounter Diagnoses   Name Primary?    Eschar of toe Yes    Ulcer of left foot with fat layer exposed (H)          Plan:  - Pt seen and evaluated.  -Recommended paint iodine to the toe.  He can use Iodosorb and Mepilex to the dorsal foot wound.  -For the last visit, he was going to be transition to a boot.  I will do this transition today so we can do daily dressing changes.  -Continue follow-up with Dr. Dixon.         Ramon Mcpherson DPM

## 2023-12-05 ENCOUNTER — OFFICE VISIT (OUTPATIENT)
Dept: ORTHOPEDICS | Facility: CLINIC | Age: 73
End: 2023-12-05
Payer: COMMERCIAL

## 2023-12-05 ENCOUNTER — ANCILLARY PROCEDURE (OUTPATIENT)
Dept: CT IMAGING | Facility: CLINIC | Age: 73
End: 2023-12-05
Attending: ORTHOPAEDIC SURGERY
Payer: COMMERCIAL

## 2023-12-05 DIAGNOSIS — M25.572 PAIN IN JOINT, ANKLE AND FOOT, LEFT: Primary | ICD-10-CM

## 2023-12-05 DIAGNOSIS — M25.572 PAIN IN JOINT, ANKLE AND FOOT, LEFT: ICD-10-CM

## 2023-12-05 PROCEDURE — 99024 POSTOP FOLLOW-UP VISIT: CPT | Performed by: ORTHOPAEDIC SURGERY

## 2023-12-05 PROCEDURE — 73700 CT LOWER EXTREMITY W/O DYE: CPT | Mod: LT | Performed by: RADIOLOGY

## 2023-12-05 NOTE — PROGRESS NOTES
Chief complaint: Status post left talonavicular and calcaneocuboid joint fusions performed on September 14, 2023    Patient presents for further follow-up accompanied his wife.  Reports to have some pain and when he is asked when the pain is happening and seems to be that he is doing some weightbearing which is AGAINST MEDICAL ADVICE.  He also reports that his walking is not intentional.    On today's exam he presents with excellent range of motion of the ankle there is diffuse swelling which is not very impressive there is well-healed surgical incisions there is no signs of infection or inflammation    CT scan was obtained today which is significant for showing still some radiolucency about at the same time I believe also that there is some consolidation across the fusion sites.    Assessment: Status post left talonavicular and calcaneocuboid joint arthrodesis    Plan: I discussed with the patient and his wife to remain off of it for another month.  I would like him to spend at least 4 months nonweightbearing with the hopes that we will accomplish the strongest fusion across his foot    He will follow-up in a month from now at that time plain x-rays of the foot will be obtained which will be mainly for documentation purposes and he will proceed with weightbearing as tolerated with a cam walker.  The regimen will be maintained for another month and he will be reassessed.    All questions were answered.

## 2023-12-05 NOTE — LETTER
12/5/2023       RE: Reilly Borja  [address] [address]    Dear Colleague,    Thank you for referring your patient, Reilly Borja, to the [facility] [place]. Please see a copy of my visit note below.    Chief complaint: Status post left talonavicular and calcaneocuboid joint fusions performed on September 14, 2023    Patient presents for further follow-up accompanied his wife.  Reports to have some pain and when he is asked when the pain is happening and seems to be that he is doing some weightbearing which is AGAINST MEDICAL ADVICE.  He also reports that his walking is not intentional.    On today's exam he presents with excellent range of motion of the ankle there is diffuse swelling which is not very impressive there is well-healed surgical incisions there is no signs of infection or inflammation    CT scan was obtained today which is significant for showing still some radiolucency about at the same time I believe also that there is some consolidation across the fusion sites.    Assessment: Status post left talonavicular and calcaneocuboid joint arthrodesis    Plan: I discussed with the patient and his wife to remain off of it for another month.  I would like him to spend at least 4 months nonweightbearing with the hopes that we will accomplish the strongest fusion across his foot    He will follow-up in a month from now at that time plain x-rays of the foot will be obtained which will be mainly for documentation purposes and he will proceed with weightbearing as tolerated with a cam walker.  The regimen will be maintained for another month and he will be reassessed.    All questions were answered.    Orlando Duarte MD

## 2024-01-03 ENCOUNTER — TELEPHONE (OUTPATIENT)
Dept: ORTHOPEDICS | Facility: CLINIC | Age: 74
End: 2024-01-03
Payer: COMMERCIAL

## 2024-01-03 NOTE — TELEPHONE ENCOUNTER
[facility] Call Center     Phone Message     May a detailed message be left on voicemail: Yes     Reason for Call:  Patient called and said that he is in so much pain and expresses frustration that he's ready to put a gun to his head due to the pain and that he is sick on being in pain, the pain is day and night and nothing can make the pain go away. The pain keeps him up all night and he is just in horrible pain.

## 2024-01-04 NOTE — CONFIDENTIAL NOTE
Discussed with Ubaldo Amaro PA-C. He recommended patient be evaluated in clinic by PA if his pain is worsening or wait until his follow up on Jan 23rd.     Call placed to patient. He does not feel like he needs to be seen prior to his scheduled appointment. The pain has not worsened but he would like to know how to manage his pain until then. He is taking Tylenol and icing/elevating. We reviewed his restrictions. We discussed that someone from [facility] may be reaching out to check in after the statement. Patient said he did not think that was needed but was understanding.     Tara Holter, RNCC

## 2024-01-04 NOTE — CONFIDENTIAL NOTE
"Call placed to patient. He says \"it was an exaggeration\" when he said he was going to put a gun to his head. He denies any thoughts of harming or killing himself. He does not see a mental health provider and is not interested in seeing one.    He reports to have been dealing with this pain for 4 years and is frustrated as he does not feel like it is improving after surgery. He describes the pain as sharp in his anterior ankle and states it is worse at night. He is taking Tylenol but states it does not help. He reports to be elevating/icing and using a knee scooter/crutches to get around. Writer will speak with provider and update patient regarding next steps.     Tara Holter, RNCC    "

## 2024-01-04 NOTE — CONFIDENTIAL NOTE
Call placed to patient. I informed him Dr. Duarte's PA did not want to prescribe medication without him being seen. Offered appointment with PA tomorrow. Patient declined and was scheduled with PA on January 12th at 3:40. Encouraged him to continue to ice/elevate and take Tylenol in the meantime. He will call if he has any concerns or his symptoms worsen. Informed him I spoke with our [facility] and they wanted to offer resources for patient and I would send these via [employer]. Patient verbalized understanding.      Tara Holter, RNCC

## 2024-01-08 DIAGNOSIS — M25.572 PAIN IN JOINT, ANKLE AND FOOT, LEFT: Primary | ICD-10-CM

## 2024-01-08 DIAGNOSIS — Z98.890 STATUS POST FOOT SURGERY: ICD-10-CM

## 2024-01-12 ENCOUNTER — ANCILLARY PROCEDURE (OUTPATIENT)
Dept: GENERAL RADIOLOGY | Facility: CLINIC | Age: 74
End: 2024-01-12
Attending: PHYSICIAN ASSISTANT
Payer: COMMERCIAL

## 2024-01-12 ENCOUNTER — OFFICE VISIT (OUTPATIENT)
Dept: ORTHOPEDICS | Facility: CLINIC | Age: 74
End: 2024-01-12
Payer: COMMERCIAL

## 2024-01-12 DIAGNOSIS — M25.572 PAIN IN JOINT, ANKLE AND FOOT, LEFT: ICD-10-CM

## 2024-01-12 DIAGNOSIS — Z98.890 STATUS POST FOOT SURGERY: ICD-10-CM

## 2024-01-12 DIAGNOSIS — M25.572 PAIN IN JOINT, ANKLE AND FOOT, LEFT: Primary | ICD-10-CM

## 2024-01-12 DIAGNOSIS — M14.672 CHARCOT'S JOINT OF LEFT FOOT: ICD-10-CM

## 2024-01-12 PROCEDURE — 73630 X-RAY EXAM OF FOOT: CPT | Mod: LT | Performed by: RADIOLOGY

## 2024-01-12 PROCEDURE — 99214 OFFICE O/P EST MOD 30 MIN: CPT | Performed by: PHYSICIAN ASSISTANT

## 2024-01-12 RX ORDER — PREGABALIN 75 MG/1
75 CAPSULE ORAL DAILY
Qty: 30 CAPSULE | Refills: 0 | Status: SHIPPED | OUTPATIENT
Start: 2024-01-12

## 2024-01-12 RX ORDER — OXYCODONE HYDROCHLORIDE 5 MG/1
5 TABLET ORAL EVERY 12 HOURS PRN
Qty: 10 TABLET | Refills: 0 | Status: SHIPPED | OUTPATIENT
Start: 2024-01-12 | End: 2024-01-15

## 2024-01-12 NOTE — NURSING NOTE
Reason For Visit:   Chief Complaint   Patient presents with    RECHECK     Left foot pain. Status post left talonavicular and calcaneocuboid joint fusions performed on September 14, 2023       There were no vitals taken for this visit.         Mell Cobian ATC

## 2024-01-12 NOTE — PROGRESS NOTES
Chief Complaint: Left ankle check  Date of surgery: 9/14/2023   Postoperative diagnosis: Left talonavicular and calcaneocuboid joint arthritis.  Left talus fracture  Procedure: Left talus partial excision.  Left talonavicular and calcaneocuboid joint arthrodesis    HPI: Reilly is a 73-year-old man here with his wife today for follow-up 4 months status post above procedure by Dr. Dixon for Charcot foot.  Patient reports worsening left medial ankle pain.  He gets sharp shooting pain every 10 seconds, especially in the evening at night.  He is having difficulty sleeping.  Has been taking Tylenol with no relief.  He is using heat and ice, as well as soaking the foot.  He has tried Lidoderm patches.  He is wearing a cam boot at all times except when sleeping.  He has backed off on his weightbearing in the boot and is now using crutches and toe-touch weightbearing.  He is frustrated.  No other concerns.    Physical Exam: Reilly is a 73-year-old man who is alert and oriented no apparent distress.  He is toe-touch weightbearing in a cam boot with crutches today.  The boot was removed.  His incisions are healed.  He has moderate swelling and edema.  He has tenderness to palpation just distal to his medial incision at the medial tibial talar joint area.  Minimal to no pain in the anterior ankle joint.    Imaging: Three-view x-ray of the left foot was ordered and obtained today.  This demonstrates:  1. Progressive fragmentation and collapse of hindfoot/midfoot,  presumably due to Charcot arthropathy.  *  Increase loss of space between tibial plafond to talus fragment and  calcaneus.  *  Increased dorsal fragment displacement of midfoot.    Impression: Worsening Charcot deformity and a 73-year-old man 4-month status post left talonavicular and calcaneocuboid joint arthrodeses    Plan: I explained to Reilly that he is had more collapse of the tibial talar joint.  This may be causing his increased pain in the medial foot and  ankle area.  I told him I will give him 1 prescription for oxycodone, mostly to use at night only.  We will also try him on Lyrica, as some of his symptoms seem to be nerve pain related.  He has tried gabapentin in the past with no relief.  He will follow-up in 2 weeks with Dr. Dixon to discuss long-term implications of his current x-ray progression and weightbearing status.  He will continue with toe-touch weightbearing in the cam boot.  He understands and agrees with the plan.  There will be no refill on the narcotics.  All questions answered.

## 2024-01-12 NOTE — LETTER
1/12/2024         RE: Reilly Borja  [address] [address]        Dear Colleague,    Thank you for referring your patient, Reilly Borja, to the [facility] [place]. Please see a copy of my visit note below.    Chief Complaint: Left ankle check  Date of surgery: 9/14/2023   Postoperative diagnosis: Left talonavicular and calcaneocuboid joint arthritis.  Left talus fracture  Procedure: Left talus partial excision.  Left talonavicular and calcaneocuboid joint arthrodesis    HPI: Reilly is a 73-year-old man here with his wife today for follow-up 4 months status post above procedure by Dr. Dixon for Charcot foot.  Patient reports worsening left medial ankle pain.  He gets sharp shooting pain every 10 seconds, especially in the evening at night.  He is having difficulty sleeping.  Has been taking Tylenol with no relief.  He is using heat and ice, as well as soaking the foot.  He has tried Lidoderm patches.  He is wearing a cam boot at all times except when sleeping.  He has backed off on his weightbearing in the boot and is now using crutches and toe-touch weightbearing.  He is frustrated.  No other concerns.    Physical Exam: Reilly is a 73-year-old man who is alert and oriented no apparent distress.  He is toe-touch weightbearing in a cam boot with crutches today.  The boot was removed.  His incisions are healed.  He has moderate swelling and edema.  He has tenderness to palpation just distal to his medial incision at the medial tibial talar joint area.  Minimal to no pain in the anterior ankle joint.    Imaging: Three-view x-ray of the left foot was ordered and obtained today.  This demonstrates:  1. Progressive fragmentation and collapse of hindfoot/midfoot,  presumably due to Charcot arthropathy.  *  Increase loss of space between tibial plafond to talus fragment and  calcaneus.  *  Increased dorsal fragment displacement of midfoot.    Impression:  Worsening Charcot deformity and a 73-year-old man 4-month status post left talonavicular and calcaneocuboid joint arthrodeses    Plan: I explained to Reilly that he is had more collapse of the tibial talar joint.  This may be causing his increased pain in the medial foot and ankle area.  I told him I will give him 1 prescription for oxycodone, mostly to use at night only.  We will also try him on Lyrica, as some of his symptoms seem to be nerve pain related.  He has tried gabapentin in the past with no relief.  He will follow-up in 2 weeks with Dr. Dixon to discuss long-term implications of his current x-ray progression and weightbearing status.  He will continue with toe-touch weightbearing in the cam boot.  He understands and agrees with the plan.  There will be no refill on the narcotics.  All questions answered.        Freida Connors PA-C

## 2024-01-23 ENCOUNTER — OFFICE VISIT (OUTPATIENT)
Dept: ORTHOPEDICS | Facility: CLINIC | Age: 74
End: 2024-01-23
Payer: COMMERCIAL

## 2024-01-23 DIAGNOSIS — M14.672 CHARCOT'S JOINT OF LEFT FOOT: Primary | ICD-10-CM

## 2024-01-23 DIAGNOSIS — Z98.890 STATUS POST FOOT SURGERY: ICD-10-CM

## 2024-01-23 DIAGNOSIS — M25.572 PAIN IN JOINT, ANKLE AND FOOT, LEFT: ICD-10-CM

## 2024-01-23 PROCEDURE — 99213 OFFICE O/P EST LOW 20 MIN: CPT | Performed by: ORTHOPAEDIC SURGERY

## 2024-01-23 NOTE — NURSING NOTE
Reason For Visit:   Chief Complaint   Patient presents with    Surgical Followup     Follow up Left Talonavicular and Calcanecuboid Joint Fusion DOS 9/13/23 // still experiencing significant amount of pain         73 year old  1950      There were no vitals taken for this visit.    Pain Assessment  Patient Currently in Pain: Yes  0-10 Pain Scale: 8  Primary Pain Location: Ankle (left)      Greg Calloway ATC

## 2024-01-24 NOTE — PROGRESS NOTES
Chief complaint: Status post left talonavicular and calcaneocuboid joint fusions performed on September 13, 2023    Patient presents today for further follow-up in the company of his wife.  Reports without cardiac pain and discomfort across the ankle joint.    Today he has gotten evaluation by one of our PAs a approximately 10 days ago where plain x-rays were obtained and he was diagnosed with collapse of the ankle joint.  Therefore he presents today for further evaluation.    On today's exam he does not have any obvious signs of Charcot.  There is no redness there is no warmth however there is some swelling across the ankle and the foot.  This is unchanged from before.  Overall the gross alignment of the ankle and the foot is quite good.    Plain x-rays from a previous visit were reviewed today which are significant for showing fact that complete collapse of the ankle with destruction of the dome of the talus.  This is highly compatible with a Charcot foot in spite of the absence of any clinical symptoms.    Assessment: Status post left talonavicular and calcaneocuboid joint fusion in the presence of ankle collapse    Plan: I discussed with the patient and his wife that at this point options are to proceed with the use of a PTB brace versus a reconstruction of the ankle versus a below the knee amputation.    Attending working to proceed with the use of the brace and to see how much we can improve his symptoms.    All questions were answered.  Patient was pleased discussion.  He will follow-up on as needed basis.      TT 20 minutes

## 2024-02-10 ENCOUNTER — HEALTH MAINTENANCE LETTER (OUTPATIENT)
Age: 74
End: 2024-02-10

## 2024-04-08 DIAGNOSIS — M14.672 CHARCOT'S JOINT OF LEFT FOOT: Primary | ICD-10-CM

## 2024-04-08 DIAGNOSIS — Z98.890 STATUS POST FOOT SURGERY: ICD-10-CM

## 2024-04-09 ENCOUNTER — OFFICE VISIT (OUTPATIENT)
Dept: ORTHOPEDICS | Facility: CLINIC | Age: 74
End: 2024-04-09
Payer: COMMERCIAL

## 2024-04-09 ENCOUNTER — ANCILLARY PROCEDURE (OUTPATIENT)
Dept: GENERAL RADIOLOGY | Facility: CLINIC | Age: 74
End: 2024-04-09
Attending: ORTHOPAEDIC SURGERY
Payer: COMMERCIAL

## 2024-04-09 DIAGNOSIS — Z98.890 STATUS POST FOOT SURGERY: ICD-10-CM

## 2024-04-09 DIAGNOSIS — M14.672 CHARCOT'S JOINT OF LEFT FOOT: ICD-10-CM

## 2024-04-09 DIAGNOSIS — M14.672 CHARCOT'S JOINT OF LEFT FOOT: Primary | ICD-10-CM

## 2024-04-09 DIAGNOSIS — M25.572 PAIN IN JOINT, ANKLE AND FOOT, LEFT: ICD-10-CM

## 2024-04-09 PROCEDURE — 99213 OFFICE O/P EST LOW 20 MIN: CPT | Performed by: ORTHOPAEDIC SURGERY

## 2024-04-09 PROCEDURE — 73630 X-RAY EXAM OF FOOT: CPT | Mod: LT | Performed by: RADIOLOGY

## 2024-04-09 NOTE — NURSING NOTE
Reason For Visit:   Chief Complaint   Patient presents with    RECHECK     Wearing  PTB brace. Not going well. Only wears it for one hour because he starts to get painful throbbing. Difficult to sleep at night due to the discomfort.        There were no vitals taken for this visit.         Mell Cobian, ATC

## 2024-04-09 NOTE — LETTER
4/9/2024      RE: Reilly Borja  [address] [address]    Dear Colleague,    Thank you for referring your patient, Reilly Borja, to the [facility] [place]. Please see a copy of my visit note below.    Chief complaint: Status post left talonavicular and calcaneocuboid joint fusion performed September 13, 2023    Patient presents today for further follow-up.  Reports to be unable to walk even 30 steps with the use of the PTB brace.  He reports that the brace is working properly.    No physical exam was performed today.    Plan x-rays of the foot are significant for showing further collapse of the ankle.  The talus presents basically no remaining body.  There is also an anterior subluxation of the tibiotalar joint.    Assessment: Status post left total navicular and calcaneocuboid joint fusion with ankle collapse    Plan: I discussed with the patient that at this point I suspect that this foot may not be salvageable.  Organ to proceed with a CT scan to better understand the bony anatomy to confirm that in fact the foot is nonsalvageable.  If that is the case the only way to improve his discomfort will be by performing a below the knee amputation to the left lower extremity.  Patient is open to that option.    Patient will be contacted via phone with regards to the CT scan results.  In the meantime he has no restrictions.    All questions were answered.    TT 20 minutes    Orlando Duarte MD

## 2024-04-10 NOTE — PROGRESS NOTES
Chief complaint: Status post left talonavicular and calcaneocuboid joint fusion performed September 13, 2023    Patient presents today for further follow-up.  Reports to be unable to walk even 30 steps with the use of the PTB brace.  He reports that the brace is working properly.    No physical exam was performed today.    Plan x-rays of the foot are significant for showing further collapse of the ankle.  The talus presents basically no remaining body.  There is also an anterior subluxation of the tibiotalar joint.    Assessment: Status post left total navicular and calcaneocuboid joint fusion with ankle collapse    Plan: I discussed with the patient that at this point I suspect that this foot may not be salvageable.  Organ to proceed with a CT scan to better understand the bony anatomy to confirm that in fact the foot is nonsalvageable.  If that is the case the only way to improve his discomfort will be by performing a below the knee amputation to the left lower extremity.  Patient is open to that option.    Patient will be contacted via phone with regards to the CT scan results.  In the meantime he has no restrictions.    All questions were answered.    TT 20 minutes

## 2024-04-16 ENCOUNTER — ANCILLARY PROCEDURE (OUTPATIENT)
Dept: CT IMAGING | Facility: CLINIC | Age: 74
End: 2024-04-16
Attending: ORTHOPAEDIC SURGERY
Payer: COMMERCIAL

## 2024-04-16 DIAGNOSIS — M14.672 CHARCOT'S JOINT OF LEFT FOOT: ICD-10-CM

## 2024-04-16 DIAGNOSIS — Z98.890 STATUS POST FOOT SURGERY: ICD-10-CM

## 2024-04-16 LAB — RADIOLOGIST FLAGS: NORMAL

## 2024-04-16 PROCEDURE — 73700 CT LOWER EXTREMITY W/O DYE: CPT | Mod: LT | Performed by: RADIOLOGY

## 2024-04-23 ENCOUNTER — VIRTUAL VISIT (OUTPATIENT)
Dept: ORTHOPEDICS | Facility: CLINIC | Age: 74
End: 2024-04-23
Payer: COMMERCIAL

## 2024-04-23 DIAGNOSIS — M25.572 PAIN IN JOINT, ANKLE AND FOOT, LEFT: Primary | ICD-10-CM

## 2024-04-23 PROCEDURE — 99214 OFFICE O/P EST MOD 30 MIN: CPT | Mod: 93 | Performed by: ORTHOPAEDIC SURGERY

## 2024-04-23 NOTE — LETTER
4/23/2024         RE: Reilly Borja  [address] [address] [address]        Dear Colleague,    Thank you for referring your patient, Reilly Borja, to the [facility] [place]. Please see a copy of my visit note below.    Chief complaint: Status post left talonavicular and calcaneocuboid joint fusion performed September 13, 2023.  Advance left ankle collapse most likely from Charcot arthropathy    Patient was interviewed via phone.  Patient authorize encounter.    I discussed with the patient the CT scan findings with her significant for showing quite a bit of collapse and resorption of the talus at both from an ankle and subtalar joint perspective.  I discussed with the patient that I do not believe that we have enough bony structure to save the foot.    Also discussed with the patient from a surgical perspective the next step would be to proceed with a below the knee amputation.    Patient reported to be managing activities a little bit better.  He is trying to do his best to cope with the current situation and to see how much we can do to improve it.    Also discussed with the patient to have a conversation with his prosthetists to see if we can maximize the PTB brace and to proceed with as much unloading as possible across the ankle joint.    For timing patient would like to hold off on the below the knee amputation.  Patient has no activity restrictions.  All questions were answered.    I spent 17 minutes with the patient today's phone interview.        Orlando Duarte MD

## 2024-04-23 NOTE — PROGRESS NOTES
Chief complaint: Status post left talonavicular and calcaneocuboid joint fusion performed September 13, 2023.  Advance left ankle collapse most likely from Charcot arthropathy    Patient was interviewed via phone.  Patient authorize encounter.    I discussed with the patient the CT scan findings with her significant for showing quite a bit of collapse and resorption of the talus at both from an ankle and subtalar joint perspective.  I discussed with the patient that I do not believe that we have enough bony structure to save the foot.    Also discussed with the patient from a surgical perspective the next step would be to proceed with a below the knee amputation.    Patient reported to be managing activities a little bit better.  He is trying to do his best to cope with the current situation and to see how much we can do to improve it.    Also discussed with the patient to have a conversation with his prosthetists to see if we can maximize the PTB brace and to proceed with as much unloading as possible across the ankle joint.    For timing patient would like to hold off on the below the knee amputation.  Patient has no activity restrictions.  All questions were answered.    I spent 17 minutes with the patient today's phone interview.

## 2024-06-29 ENCOUNTER — HEALTH MAINTENANCE LETTER (OUTPATIENT)
Age: 74
End: 2024-06-29

## 2024-08-08 ENCOUNTER — TRANSFERRED RECORDS (OUTPATIENT)
Dept: HEALTH INFORMATION MANAGEMENT | Facility: CLINIC | Age: 74
End: 2024-08-08
Payer: COMMERCIAL

## 2024-08-08 ENCOUNTER — TRANSCRIBE ORDERS (OUTPATIENT)
Dept: OTHER | Age: 74
End: 2024-08-08

## 2024-08-08 DIAGNOSIS — M25.572 PAIN IN LEFT ANKLE AND JOINTS OF LEFT FOOT: Primary | ICD-10-CM

## 2024-08-13 ENCOUNTER — VIRTUAL VISIT (OUTPATIENT)
Dept: ORTHOPEDICS | Facility: CLINIC | Age: 74
End: 2024-08-13
Payer: COMMERCIAL

## 2024-08-13 DIAGNOSIS — M25.572 PAIN IN LEFT ANKLE AND JOINTS OF LEFT FOOT: ICD-10-CM

## 2024-08-13 PROCEDURE — 99442 PR PHYSICIAN TELEPHONE EVALUATION 11-20 MIN: CPT | Mod: 93 | Performed by: ORTHOPAEDIC SURGERY

## 2024-08-13 NOTE — LETTER
8/13/2024      Reilly Borja  [address] [id number]      Dear Colleague,    Thank you for referring your patient, Reilly Borja, to the [facility] [place]. Please see a copy of my visit note below.    Chief complaint: Status post left navicular cuboid joint fusion performed September 13, 2023 with father Charcot arthropathy and ankle collapse    Patient was interviewed via phone.  This authorized encounter.    Patient reports to be doing not very well.  He reports to feel like an invalid something that he does not need or enjoy or is used to it.  Reports to have PTB brace which allows him to do some ambulation for an hour at the very most.    We had a conversation with regards to the possibility of undergoing a below the knee amputation which I think it would be the only reasonable step for him to improve his level of function.    He reports to be 90% certain that that is the option that he would like to pursue.  They also discussed with him the most likely postoperative course from such intervention    For timing he would like to do further thinking and he will contact us when he wishes to proceed with a below the knee amputation.    All questions were answered.  Patient was placed in discussion.  He will follow-up accordingly.    I spent 17 minutes in today's patient's phone interview.      Again, thank you for allowing me to participate in the care of your patient.        Sincerely,        Orlando Duarte MD

## 2024-08-13 NOTE — PROGRESS NOTES
Chief complaint: Status post left navicular cuboid joint fusion performed September 13, 2023 with father Charcot arthropathy and ankle collapse    Patient was interviewed via phone.  This authorized encounter.    Patient reports to be doing not very well.  He reports to feel like an invalid something that he does not need or enjoy or is used to it.  Reports to have PTB brace which allows him to do some ambulation for an hour at the very most.    We had a conversation with regards to the possibility of undergoing a below the knee amputation which I think it would be the only reasonable step for him to improve his level of function.    He reports to be 90% certain that that is the option that he would like to pursue.  They also discussed with him the most likely postoperative course from such intervention    For timing he would like to do further thinking and he will contact us when he wishes to proceed with a below the knee amputation.    All questions were answered.  Patient was placed in discussion.  He will follow-up accordingly.    I spent 17 minutes in today's patient's phone interview.

## 2024-09-07 ENCOUNTER — HEALTH MAINTENANCE LETTER (OUTPATIENT)
Age: 74
End: 2024-09-07

## 2025-03-08 ENCOUNTER — HEALTH MAINTENANCE LETTER (OUTPATIENT)
Age: 75
End: 2025-03-08

## 2025-07-13 ENCOUNTER — HEALTH MAINTENANCE LETTER (OUTPATIENT)
Age: 75
End: 2025-07-13

## 2025-07-18 ENCOUNTER — APPOINTMENT (OUTPATIENT)
Dept: GENERAL RADIOLOGY | Facility: CLINIC | Age: 75
End: 2025-07-18
Attending: STUDENT IN AN ORGANIZED HEALTH CARE EDUCATION/TRAINING PROGRAM
Payer: COMMERCIAL

## 2025-07-18 ENCOUNTER — ANCILLARY PROCEDURE (OUTPATIENT)
Dept: ULTRASOUND IMAGING | Facility: CLINIC | Age: 75
End: 2025-07-18
Payer: COMMERCIAL

## 2025-07-18 ENCOUNTER — HOSPITAL ENCOUNTER (INPATIENT)
Facility: CLINIC | Age: 75
LOS: 2 days | Discharge: HOME | End: 2025-07-20
Attending: STUDENT IN AN ORGANIZED HEALTH CARE EDUCATION/TRAINING PROGRAM | Admitting: FAMILY MEDICINE
Payer: COMMERCIAL

## 2025-07-18 ENCOUNTER — APPOINTMENT (OUTPATIENT)
Dept: GENERAL RADIOLOGY | Facility: CLINIC | Age: 75
End: 2025-07-18
Attending: PHYSICIAN ASSISTANT
Payer: COMMERCIAL

## 2025-07-18 ENCOUNTER — APPOINTMENT (OUTPATIENT)
Dept: GENERAL RADIOLOGY | Facility: CLINIC | Age: 75
End: 2025-07-18
Attending: ORTHOPAEDIC SURGERY
Payer: COMMERCIAL

## 2025-07-18 DIAGNOSIS — S72.002A CLOSED FRACTURE OF NECK OF LEFT FEMUR, INITIAL ENCOUNTER (H): ICD-10-CM

## 2025-07-18 DIAGNOSIS — Z96.649 STATUS POST TOTAL REPLACEMENT OF HIP, UNSPECIFIED LATERALITY: ICD-10-CM

## 2025-07-18 DIAGNOSIS — Z98.890 S/P SPINAL SURGERY: ICD-10-CM

## 2025-07-18 DIAGNOSIS — M14.672 CHARCOT'S JOINT OF LEFT FOOT: ICD-10-CM

## 2025-07-18 DIAGNOSIS — Z96.642 STATUS POST TOTAL REPLACEMENT OF LEFT HIP: Primary | ICD-10-CM

## 2025-07-18 DIAGNOSIS — M54.16 LUMBAR RADICULOPATHY: ICD-10-CM

## 2025-07-18 PROBLEM — E78.00 PURE HYPERCHOLESTEROLEMIA: Status: ACTIVE | Noted: 2021-09-16

## 2025-07-18 PROBLEM — I50.30 (HFPEF) HEART FAILURE WITH PRESERVED EJECTION FRACTION (H): Status: ACTIVE | Noted: 2021-06-03

## 2025-07-18 PROBLEM — K76.0 NONALCOHOLIC FATTY LIVER DISEASE: Status: ACTIVE | Noted: 2021-09-16

## 2025-07-18 PROBLEM — E11.8 TYPE 2 DIABETES MELLITUS WITH COMPLICATION, WITH LONG-TERM CURRENT USE OF INSULIN (H): Status: ACTIVE | Noted: 2019-11-16

## 2025-07-18 PROBLEM — Z79.4 TYPE 2 DIABETES MELLITUS WITH COMPLICATION, WITH LONG-TERM CURRENT USE OF INSULIN (H): Status: ACTIVE | Noted: 2019-11-16

## 2025-07-18 PROBLEM — I10 ESSENTIAL HYPERTENSION: Status: ACTIVE | Noted: 2019-11-16

## 2025-07-18 LAB
ABO + RH BLD: NORMAL
ALBUMIN SERPL BCG-MCNC: 3.7 G/DL (ref 3.5–5.2)
ALP SERPL-CCNC: 229 U/L (ref 40–150)
ALT SERPL W P-5'-P-CCNC: 16 U/L (ref 0–70)
ANION GAP SERPL CALCULATED.3IONS-SCNC: 14 MMOL/L (ref 7–15)
AST SERPL W P-5'-P-CCNC: 34 U/L (ref 0–45)
ATRIAL RATE - MUSE: 86 BPM
BILIRUB DIRECT SERPL-MCNC: <0.08 MG/DL (ref 0–0.3)
BILIRUB SERPL-MCNC: 0.5 MG/DL
BLD GP AB SCN SERPL QL: NEGATIVE
BUN SERPL-MCNC: 18.6 MG/DL (ref 8–23)
CALCIUM SERPL-MCNC: 8.5 MG/DL (ref 8.8–10.4)
CHLORIDE SERPL-SCNC: 96 MMOL/L (ref 98–107)
CREAT SERPL-MCNC: 1.09 MG/DL (ref 0.67–1.17)
DIASTOLIC BLOOD PRESSURE - MUSE: NORMAL MMHG
EGFRCR SERPLBLD CKD-EPI 2021: 71 ML/MIN/1.73M2
ERYTHROCYTE [DISTWIDTH] IN BLOOD BY AUTOMATED COUNT: 15.4 % (ref 10–15)
EST. AVERAGE GLUCOSE BLD GHB EST-MCNC: 177 MG/DL
GLUCOSE BLDC GLUCOMTR-MCNC: 129 MG/DL (ref 70–99)
GLUCOSE BLDC GLUCOMTR-MCNC: 157 MG/DL (ref 70–99)
GLUCOSE BLDC GLUCOMTR-MCNC: 162 MG/DL (ref 70–99)
GLUCOSE BLDC GLUCOMTR-MCNC: 415 MG/DL (ref 70–99)
GLUCOSE BLDC GLUCOMTR-MCNC: 429 MG/DL (ref 70–99)
GLUCOSE SERPL-MCNC: 102 MG/DL (ref 70–99)
HBA1C MFR BLD: 7.8 %
HCO3 SERPL-SCNC: 25 MMOL/L (ref 22–29)
HCT VFR BLD AUTO: 41 % (ref 40–53)
HGB BLD-MCNC: 12.8 G/DL (ref 13.3–17.7)
HOLD SPECIMEN: NORMAL
INR PPP: 1.01 (ref 0.85–1.15)
INTERPRETATION ECG - MUSE: NORMAL
MCH RBC QN AUTO: 28.3 PG (ref 26.5–33)
MCHC RBC AUTO-ENTMCNC: 31.2 G/DL (ref 31.5–36.5)
MCV RBC AUTO: 91 FL (ref 78–100)
P AXIS - MUSE: 62 DEGREES
PLATELET # BLD AUTO: 291 10E3/UL (ref 150–450)
POTASSIUM SERPL-SCNC: 3.7 MMOL/L (ref 3.4–5.3)
PR INTERVAL - MUSE: 232 MS
PROT SERPL-MCNC: 7.7 G/DL (ref 6.4–8.3)
PROTHROMBIN TIME: 13.2 SECONDS (ref 11.8–14.8)
QRS DURATION - MUSE: 144 MS
QT - MUSE: 418 MS
QTC - MUSE: 500 MS
R AXIS - MUSE: -73 DEGREES
RBC # BLD AUTO: 4.53 10E6/UL (ref 4.4–5.9)
SODIUM SERPL-SCNC: 135 MMOL/L (ref 135–145)
SPECIMEN EXP DATE BLD: NORMAL
SYSTOLIC BLOOD PRESSURE - MUSE: NORMAL MMHG
T AXIS - MUSE: 27 DEGREES
VENTRICULAR RATE- MUSE: 86 BPM
WBC # BLD AUTO: 8.6 10E3/UL (ref 4–11)

## 2025-07-18 PROCEDURE — 85027 COMPLETE CBC AUTOMATED: CPT | Performed by: STUDENT IN AN ORGANIZED HEALTH CARE EDUCATION/TRAINING PROGRAM

## 2025-07-18 PROCEDURE — 250N000011 HC RX IP 250 OP 636

## 2025-07-18 PROCEDURE — 71045 X-RAY EXAM CHEST 1 VIEW: CPT

## 2025-07-18 PROCEDURE — 99285 EMERGENCY DEPT VISIT HI MDM: CPT | Mod: 25 | Performed by: STUDENT IN AN ORGANIZED HEALTH CARE EDUCATION/TRAINING PROGRAM

## 2025-07-18 PROCEDURE — 73502 X-RAY EXAM HIP UNI 2-3 VIEWS: CPT

## 2025-07-18 PROCEDURE — 250N000011 HC RX IP 250 OP 636: Performed by: ORTHOPAEDIC SURGERY

## 2025-07-18 PROCEDURE — 99223 1ST HOSP IP/OBS HIGH 75: CPT | Mod: FS | Performed by: PHYSICIAN ASSISTANT

## 2025-07-18 PROCEDURE — 250N000011 HC RX IP 250 OP 636: Performed by: NURSE ANESTHETIST, CERTIFIED REGISTERED

## 2025-07-18 PROCEDURE — 82962 GLUCOSE BLOOD TEST: CPT

## 2025-07-18 PROCEDURE — 250N000013 HC RX MED GY IP 250 OP 250 PS 637

## 2025-07-18 PROCEDURE — 250N000011 HC RX IP 250 OP 636: Performed by: PHYSICIAN ASSISTANT

## 2025-07-18 PROCEDURE — 82040 ASSAY OF SERUM ALBUMIN: CPT | Performed by: PHYSICIAN ASSISTANT

## 2025-07-18 PROCEDURE — 370N000017 HC ANESTHESIA TECHNICAL FEE, PER MIN: Performed by: ORTHOPAEDIC SURGERY

## 2025-07-18 PROCEDURE — 250N000025 HC SEVOFLURANE, PER MIN: Performed by: ORTHOPAEDIC SURGERY

## 2025-07-18 PROCEDURE — 710N000009 HC RECOVERY PHASE 1, LEVEL 1, PER MIN: Performed by: ORTHOPAEDIC SURGERY

## 2025-07-18 PROCEDURE — 36415 COLL VENOUS BLD VENIPUNCTURE: CPT | Performed by: PHYSICIAN ASSISTANT

## 2025-07-18 PROCEDURE — 36415 COLL VENOUS BLD VENIPUNCTURE: CPT | Performed by: STUDENT IN AN ORGANIZED HEALTH CARE EDUCATION/TRAINING PROGRAM

## 2025-07-18 PROCEDURE — C1776 JOINT DEVICE (IMPLANTABLE): HCPCS | Performed by: ORTHOPAEDIC SURGERY

## 2025-07-18 PROCEDURE — 120N000001 HC R&B MED SURG/OB

## 2025-07-18 PROCEDURE — 99418 PROLNG IP/OBS E/M EA 15 MIN: CPT | Mod: FS | Performed by: PHYSICIAN ASSISTANT

## 2025-07-18 PROCEDURE — 0SRB04A REPLACEMENT OF LEFT HIP JOINT WITH CERAMIC ON POLYETHYLENE SYNTHETIC SUBSTITUTE, UNCEMENTED, OPEN APPROACH: ICD-10-PCS | Performed by: ORTHOPAEDIC SURGERY

## 2025-07-18 PROCEDURE — 999N000141 HC STATISTIC PRE-PROCEDURE NURSING ASSESSMENT: Performed by: ORTHOPAEDIC SURGERY

## 2025-07-18 PROCEDURE — 250N000009 HC RX 250: Performed by: ORTHOPAEDIC SURGERY

## 2025-07-18 PROCEDURE — 99207 PR APP CREDIT; MD BILLING SHARED VISIT: CPT | Mod: FS | Performed by: FAMILY MEDICINE

## 2025-07-18 PROCEDURE — 999N000065 XR PELVIS PORT 1/2 VIEWS

## 2025-07-18 PROCEDURE — 83036 HEMOGLOBIN GLYCOSYLATED A1C: CPT | Performed by: PHYSICIAN ASSISTANT

## 2025-07-18 PROCEDURE — 80048 BASIC METABOLIC PNL TOTAL CA: CPT | Performed by: STUDENT IN AN ORGANIZED HEALTH CARE EDUCATION/TRAINING PROGRAM

## 2025-07-18 PROCEDURE — 86900 BLOOD TYPING SEROLOGIC ABO: CPT | Performed by: PHYSICIAN ASSISTANT

## 2025-07-18 PROCEDURE — 258N000003 HC RX IP 258 OP 636: Performed by: EMERGENCY MEDICINE

## 2025-07-18 PROCEDURE — 250N000013 HC RX MED GY IP 250 OP 250 PS 637: Performed by: PHYSICIAN ASSISTANT

## 2025-07-18 PROCEDURE — 360N000077 HC SURGERY LEVEL 4, PER MIN: Performed by: ORTHOPAEDIC SURGERY

## 2025-07-18 PROCEDURE — 96376 TX/PRO/DX INJ SAME DRUG ADON: CPT | Performed by: STUDENT IN AN ORGANIZED HEALTH CARE EDUCATION/TRAINING PROGRAM

## 2025-07-18 PROCEDURE — 250N000013 HC RX MED GY IP 250 OP 250 PS 637: Performed by: ORTHOPAEDIC SURGERY

## 2025-07-18 PROCEDURE — 96374 THER/PROPH/DIAG INJ IV PUSH: CPT | Performed by: STUDENT IN AN ORGANIZED HEALTH CARE EDUCATION/TRAINING PROGRAM

## 2025-07-18 PROCEDURE — 85610 PROTHROMBIN TIME: CPT | Performed by: PHYSICIAN ASSISTANT

## 2025-07-18 PROCEDURE — 272N000001 HC OR GENERAL SUPPLY STERILE: Performed by: ORTHOPAEDIC SURGERY

## 2025-07-18 PROCEDURE — 250N000012 HC RX MED GY IP 250 OP 636 PS 637: Performed by: ORTHOPAEDIC SURGERY

## 2025-07-18 PROCEDURE — 250N000011 HC RX IP 250 OP 636: Performed by: STUDENT IN AN ORGANIZED HEALTH CARE EDUCATION/TRAINING PROGRAM

## 2025-07-18 PROCEDURE — 99285 EMERGENCY DEPT VISIT HI MDM: CPT | Performed by: STUDENT IN AN ORGANIZED HEALTH CARE EDUCATION/TRAINING PROGRAM

## 2025-07-18 DEVICE — IMP LINER HIP DEPUY PINNACLE ALTRX 40X58MM 1221-40-058: Type: IMPLANTABLE DEVICE | Site: HIP | Status: FUNCTIONAL

## 2025-07-18 DEVICE — IMPLANTABLE DEVICE: Type: IMPLANTABLE DEVICE | Site: HIP | Status: FUNCTIONAL

## 2025-07-18 DEVICE — IMP CUP ACE PINNACLE 58MM 1217-22-058: Type: IMPLANTABLE DEVICE | Site: HIP | Status: FUNCTIONAL

## 2025-07-18 RX ORDER — SALIVA STIMULANT COMB. NO.3
1 SPRAY, NON-AEROSOL (ML) MUCOUS MEMBRANE 4 TIMES DAILY PRN
Status: DISCONTINUED | OUTPATIENT
Start: 2025-07-18 | End: 2025-07-20 | Stop reason: HOSPADM

## 2025-07-18 RX ORDER — INSULIN GLARGINE 100 [IU]/ML
28 INJECTION, SOLUTION SUBCUTANEOUS EVERY EVENING
COMMUNITY

## 2025-07-18 RX ORDER — HYDROMORPHONE HCL IN WATER/PF 6 MG/30 ML
0.2 PATIENT CONTROLLED ANALGESIA SYRINGE INTRAVENOUS EVERY 5 MIN PRN
Refills: 0 | Status: DISCONTINUED | OUTPATIENT
Start: 2025-07-18 | End: 2025-07-18 | Stop reason: HOSPADM

## 2025-07-18 RX ORDER — OXYCODONE HYDROCHLORIDE 5 MG/1
5 TABLET ORAL ONCE
Refills: 0 | Status: COMPLETED | OUTPATIENT
Start: 2025-07-18 | End: 2025-07-18

## 2025-07-18 RX ORDER — HYDROMORPHONE HYDROCHLORIDE 1 MG/ML
0.5 INJECTION, SOLUTION INTRAMUSCULAR; INTRAVENOUS; SUBCUTANEOUS
Refills: 0 | Status: DISCONTINUED | OUTPATIENT
Start: 2025-07-18 | End: 2025-07-18

## 2025-07-18 RX ORDER — ACETAMINOPHEN 325 MG/1
975 TABLET ORAL EVERY 8 HOURS
Status: DISCONTINUED | OUTPATIENT
Start: 2025-07-18 | End: 2025-07-20 | Stop reason: HOSPADM

## 2025-07-18 RX ORDER — ONDANSETRON 2 MG/ML
4 INJECTION INTRAMUSCULAR; INTRAVENOUS EVERY 6 HOURS PRN
Status: DISCONTINUED | OUTPATIENT
Start: 2025-07-18 | End: 2025-07-18

## 2025-07-18 RX ORDER — FAMOTIDINE 20 MG/1
20 TABLET, FILM COATED ORAL 2 TIMES DAILY
Status: DISCONTINUED | OUTPATIENT
Start: 2025-07-18 | End: 2025-07-20 | Stop reason: HOSPADM

## 2025-07-18 RX ORDER — PREGABALIN 75 MG/1
75 CAPSULE ORAL DAILY
Status: DISCONTINUED | OUTPATIENT
Start: 2025-07-18 | End: 2025-07-18

## 2025-07-18 RX ORDER — SACUBITRIL AND VALSARTAN 97; 103 MG/1; MG/1
1 TABLET, FILM COATED ORAL 2 TIMES DAILY
Status: DISCONTINUED | OUTPATIENT
Start: 2025-07-18 | End: 2025-07-20 | Stop reason: HOSPADM

## 2025-07-18 RX ORDER — CALCIUM CARBONATE 500 MG/1
1000 TABLET, CHEWABLE ORAL 4 TIMES DAILY PRN
Status: DISCONTINUED | OUTPATIENT
Start: 2025-07-18 | End: 2025-07-18

## 2025-07-18 RX ORDER — CEFAZOLIN SODIUM 2 G/50ML
2 SOLUTION INTRAVENOUS EVERY 8 HOURS
Status: COMPLETED | OUTPATIENT
Start: 2025-07-18 | End: 2025-07-19

## 2025-07-18 RX ORDER — ONDANSETRON 2 MG/ML
4 INJECTION INTRAMUSCULAR; INTRAVENOUS EVERY 6 HOURS PRN
Status: DISCONTINUED | OUTPATIENT
Start: 2025-07-18 | End: 2025-07-20 | Stop reason: HOSPADM

## 2025-07-18 RX ORDER — ACETAMINOPHEN 325 MG/1
975 TABLET ORAL ONCE
Status: DISCONTINUED | OUTPATIENT
Start: 2025-07-18 | End: 2025-07-18 | Stop reason: HOSPADM

## 2025-07-18 RX ORDER — ONDANSETRON 4 MG/1
4 TABLET, ORALLY DISINTEGRATING ORAL EVERY 30 MIN PRN
Status: DISCONTINUED | OUTPATIENT
Start: 2025-07-18 | End: 2025-07-18 | Stop reason: HOSPADM

## 2025-07-18 RX ORDER — NALOXONE HYDROCHLORIDE 0.4 MG/ML
0.4 INJECTION, SOLUTION INTRAMUSCULAR; INTRAVENOUS; SUBCUTANEOUS
Status: DISCONTINUED | OUTPATIENT
Start: 2025-07-18 | End: 2025-07-20 | Stop reason: HOSPADM

## 2025-07-18 RX ORDER — TORSEMIDE 20 MG/1
40 TABLET ORAL DAILY
Status: DISCONTINUED | OUTPATIENT
Start: 2025-07-19 | End: 2025-07-20 | Stop reason: HOSPADM

## 2025-07-18 RX ORDER — OXYCODONE HYDROCHLORIDE 5 MG/1
5-10 TABLET ORAL EVERY 4 HOURS PRN
Qty: 26 TABLET | Refills: 0 | Status: ON HOLD | OUTPATIENT
Start: 2025-07-18 | End: 2025-07-23

## 2025-07-18 RX ORDER — SACUBITRIL AND VALSARTAN 97; 103 MG/1; MG/1
1 TABLET, FILM COATED ORAL 2 TIMES DAILY
Status: CANCELLED | OUTPATIENT
Start: 2025-07-18

## 2025-07-18 RX ORDER — DEXTROSE MONOHYDRATE 25 G/50ML
25-50 INJECTION, SOLUTION INTRAVENOUS
Status: DISCONTINUED | OUTPATIENT
Start: 2025-07-18 | End: 2025-07-20 | Stop reason: HOSPADM

## 2025-07-18 RX ORDER — FENTANYL CITRATE 50 UG/ML
25 INJECTION, SOLUTION INTRAMUSCULAR; INTRAVENOUS EVERY 5 MIN PRN
Refills: 0 | Status: DISCONTINUED | OUTPATIENT
Start: 2025-07-18 | End: 2025-07-18 | Stop reason: HOSPADM

## 2025-07-18 RX ORDER — NICOTINE POLACRILEX 4 MG
15-30 LOZENGE BUCCAL
Status: DISCONTINUED | OUTPATIENT
Start: 2025-07-18 | End: 2025-07-20 | Stop reason: HOSPADM

## 2025-07-18 RX ORDER — BISACODYL 10 MG
10 SUPPOSITORY, RECTAL RECTAL DAILY PRN
Status: DISCONTINUED | OUTPATIENT
Start: 2025-07-18 | End: 2025-07-20 | Stop reason: HOSPADM

## 2025-07-18 RX ORDER — GABAPENTIN 300 MG/1
900 CAPSULE ORAL AT BEDTIME
COMMUNITY

## 2025-07-18 RX ORDER — HYDROXYZINE HYDROCHLORIDE 10 MG/1
10 TABLET, FILM COATED ORAL EVERY 6 HOURS PRN
Status: DISCONTINUED | OUTPATIENT
Start: 2025-07-18 | End: 2025-07-18

## 2025-07-18 RX ORDER — HYDROMORPHONE HYDROCHLORIDE 1 MG/ML
.3-.5 INJECTION, SOLUTION INTRAMUSCULAR; INTRAVENOUS; SUBCUTANEOUS
Refills: 0 | Status: CANCELLED | OUTPATIENT
Start: 2025-07-18

## 2025-07-18 RX ORDER — OXYCODONE HYDROCHLORIDE 5 MG/1
5 TABLET ORAL EVERY 4 HOURS PRN
Status: DISCONTINUED | OUTPATIENT
Start: 2025-07-18 | End: 2025-07-20 | Stop reason: HOSPADM

## 2025-07-18 RX ORDER — NALOXONE HYDROCHLORIDE 0.4 MG/ML
0.2 INJECTION, SOLUTION INTRAMUSCULAR; INTRAVENOUS; SUBCUTANEOUS
Status: DISCONTINUED | OUTPATIENT
Start: 2025-07-18 | End: 2025-07-20 | Stop reason: HOSPADM

## 2025-07-18 RX ORDER — ACETAMINOPHEN 325 MG/1
975 TABLET ORAL ONCE
Status: COMPLETED | OUTPATIENT
Start: 2025-07-18 | End: 2025-07-18

## 2025-07-18 RX ORDER — SODIUM CHLORIDE, SODIUM LACTATE, POTASSIUM CHLORIDE, CALCIUM CHLORIDE 600; 310; 30; 20 MG/100ML; MG/100ML; MG/100ML; MG/100ML
INJECTION, SOLUTION INTRAVENOUS CONTINUOUS
Status: DISCONTINUED | OUTPATIENT
Start: 2025-07-18 | End: 2025-07-18 | Stop reason: HOSPADM

## 2025-07-18 RX ORDER — NALOXONE HYDROCHLORIDE 0.4 MG/ML
0.1 INJECTION, SOLUTION INTRAMUSCULAR; INTRAVENOUS; SUBCUTANEOUS
Status: DISCONTINUED | OUTPATIENT
Start: 2025-07-18 | End: 2025-07-18 | Stop reason: HOSPADM

## 2025-07-18 RX ORDER — DULOXETIN HYDROCHLORIDE 30 MG/1
30 CAPSULE, DELAYED RELEASE ORAL DAILY
COMMUNITY

## 2025-07-18 RX ORDER — SPIRONOLACTONE 25 MG/1
25 TABLET ORAL
Status: CANCELLED | OUTPATIENT
Start: 2025-07-18

## 2025-07-18 RX ORDER — CEFAZOLIN SODIUM/WATER 2 G/20 ML
2 SYRINGE (ML) INTRAVENOUS
Status: COMPLETED | OUTPATIENT
Start: 2025-07-18 | End: 2025-07-18

## 2025-07-18 RX ORDER — METHOCARBAMOL 500 MG/1
250 TABLET ORAL EVERY 6 HOURS PRN
Status: DISCONTINUED | OUTPATIENT
Start: 2025-07-18 | End: 2025-07-20 | Stop reason: HOSPADM

## 2025-07-18 RX ORDER — LIDOCAINE 40 MG/G
CREAM TOPICAL
Status: DISCONTINUED | OUTPATIENT
Start: 2025-07-18 | End: 2025-07-18

## 2025-07-18 RX ORDER — ONDANSETRON 4 MG/1
4 TABLET, ORALLY DISINTEGRATING ORAL EVERY 6 HOURS PRN
Status: DISCONTINUED | OUTPATIENT
Start: 2025-07-18 | End: 2025-07-18

## 2025-07-18 RX ORDER — HYDROXYZINE HYDROCHLORIDE 25 MG/1
25 TABLET, FILM COATED ORAL 3 TIMES DAILY PRN
Status: CANCELLED | OUTPATIENT
Start: 2025-07-18

## 2025-07-18 RX ORDER — TRAMADOL HYDROCHLORIDE 50 MG/1
50 TABLET ORAL 4 TIMES DAILY PRN
Status: ON HOLD | COMMUNITY
End: 2025-07-23

## 2025-07-18 RX ORDER — CARVEDILOL 25 MG/1
25 TABLET ORAL 2 TIMES DAILY WITH MEALS
Status: DISCONTINUED | OUTPATIENT
Start: 2025-07-19 | End: 2025-07-20 | Stop reason: HOSPADM

## 2025-07-18 RX ORDER — DEXAMETHASONE SODIUM PHOSPHATE 4 MG/ML
4 INJECTION, SOLUTION INTRA-ARTICULAR; INTRALESIONAL; INTRAMUSCULAR; INTRAVENOUS; SOFT TISSUE
Status: DISCONTINUED | OUTPATIENT
Start: 2025-07-18 | End: 2025-07-18 | Stop reason: HOSPADM

## 2025-07-18 RX ORDER — HYDROMORPHONE HCL IN WATER/PF 6 MG/30 ML
0.1 PATIENT CONTROLLED ANALGESIA SYRINGE INTRAVENOUS EVERY 4 HOURS PRN
Status: DISCONTINUED | OUTPATIENT
Start: 2025-07-18 | End: 2025-07-20 | Stop reason: HOSPADM

## 2025-07-18 RX ORDER — LIDOCAINE 40 MG/G
CREAM TOPICAL
Status: DISCONTINUED | OUTPATIENT
Start: 2025-07-18 | End: 2025-07-18 | Stop reason: HOSPADM

## 2025-07-18 RX ORDER — BUPIVACAINE HYDROCHLORIDE 5 MG/ML
INJECTION, SOLUTION PERINEURAL PRN
Status: DISCONTINUED | OUTPATIENT
Start: 2025-07-18 | End: 2025-07-18 | Stop reason: HOSPADM

## 2025-07-18 RX ORDER — CARVEDILOL 25 MG/1
25 TABLET ORAL 2 TIMES DAILY WITH MEALS
Status: CANCELLED | OUTPATIENT
Start: 2025-07-18

## 2025-07-18 RX ORDER — PROCHLORPERAZINE MALEATE 5 MG/1
5 TABLET ORAL EVERY 6 HOURS PRN
Status: DISCONTINUED | OUTPATIENT
Start: 2025-07-18 | End: 2025-07-20 | Stop reason: HOSPADM

## 2025-07-18 RX ORDER — SPIRONOLACTONE 25 MG/1
25 TABLET ORAL DAILY
Status: DISCONTINUED | OUTPATIENT
Start: 2025-07-18 | End: 2025-07-20 | Stop reason: HOSPADM

## 2025-07-18 RX ORDER — HYDROMORPHONE HCL IN WATER/PF 6 MG/30 ML
0.4 PATIENT CONTROLLED ANALGESIA SYRINGE INTRAVENOUS EVERY 5 MIN PRN
Refills: 0 | Status: DISCONTINUED | OUTPATIENT
Start: 2025-07-18 | End: 2025-07-18 | Stop reason: HOSPADM

## 2025-07-18 RX ORDER — TORSEMIDE 20 MG/1
40 TABLET ORAL 2 TIMES DAILY
Status: DISCONTINUED | OUTPATIENT
Start: 2025-07-18 | End: 2025-07-18

## 2025-07-18 RX ORDER — ACETAMINOPHEN 325 MG/1
650 TABLET ORAL EVERY 4 HOURS PRN
Status: SHIPPED
Start: 2025-07-18 | End: 2025-07-20

## 2025-07-18 RX ORDER — SACUBITRIL AND VALSARTAN 97; 103 MG/1; MG/1
1 TABLET, FILM COATED ORAL DAILY
COMMUNITY

## 2025-07-18 RX ORDER — TORSEMIDE 20 MG/1
40 TABLET ORAL 2 TIMES DAILY
Status: CANCELLED | OUTPATIENT
Start: 2025-07-18

## 2025-07-18 RX ORDER — OXYCODONE HYDROCHLORIDE 5 MG/1
10 TABLET ORAL EVERY 4 HOURS PRN
Refills: 0 | Status: DISCONTINUED | OUTPATIENT
Start: 2025-07-18 | End: 2025-07-18

## 2025-07-18 RX ORDER — AMOXICILLIN 250 MG
2 CAPSULE ORAL 2 TIMES DAILY PRN
Status: DISCONTINUED | OUTPATIENT
Start: 2025-07-18 | End: 2025-07-20 | Stop reason: HOSPADM

## 2025-07-18 RX ORDER — AMOXICILLIN 250 MG
1 CAPSULE ORAL 2 TIMES DAILY PRN
Status: DISCONTINUED | OUTPATIENT
Start: 2025-07-18 | End: 2025-07-20 | Stop reason: HOSPADM

## 2025-07-18 RX ORDER — LIDOCAINE 40 MG/G
CREAM TOPICAL
Status: DISCONTINUED | OUTPATIENT
Start: 2025-07-18 | End: 2025-07-20 | Stop reason: HOSPADM

## 2025-07-18 RX ORDER — KETOROLAC TROMETHAMINE 15 MG/ML
15 INJECTION, SOLUTION INTRAMUSCULAR; INTRAVENOUS EVERY 6 HOURS
Status: COMPLETED | OUTPATIENT
Start: 2025-07-18 | End: 2025-07-19

## 2025-07-18 RX ORDER — HYDROMORPHONE HCL IN WATER/PF 6 MG/30 ML
0.2 PATIENT CONTROLLED ANALGESIA SYRINGE INTRAVENOUS EVERY 4 HOURS PRN
Status: DISCONTINUED | OUTPATIENT
Start: 2025-07-18 | End: 2025-07-20 | Stop reason: HOSPADM

## 2025-07-18 RX ORDER — AMOXICILLIN 250 MG
1 CAPSULE ORAL 2 TIMES DAILY
Status: DISCONTINUED | OUTPATIENT
Start: 2025-07-18 | End: 2025-07-20 | Stop reason: HOSPADM

## 2025-07-18 RX ORDER — TRANEXAMIC ACID 650 MG/1
1950 TABLET ORAL ONCE
Status: DISCONTINUED | OUTPATIENT
Start: 2025-07-18 | End: 2025-07-18 | Stop reason: HOSPADM

## 2025-07-18 RX ORDER — CEFAZOLIN SODIUM/WATER 2 G/20 ML
2 SYRINGE (ML) INTRAVENOUS SEE ADMIN INSTRUCTIONS
Status: DISCONTINUED | OUTPATIENT
Start: 2025-07-18 | End: 2025-07-18 | Stop reason: HOSPADM

## 2025-07-18 RX ORDER — HYDROMORPHONE HYDROCHLORIDE 1 MG/ML
0.3 INJECTION, SOLUTION INTRAMUSCULAR; INTRAVENOUS; SUBCUTANEOUS
Status: COMPLETED | OUTPATIENT
Start: 2025-07-18 | End: 2025-07-18

## 2025-07-18 RX ORDER — AMOXICILLIN 250 MG
1-2 CAPSULE ORAL 2 TIMES DAILY PRN
Qty: 30 TABLET | Refills: 0 | Status: SHIPPED | OUTPATIENT
Start: 2025-07-18

## 2025-07-18 RX ORDER — SODIUM CHLORIDE 9 MG/ML
INJECTION, SOLUTION INTRAVENOUS CONTINUOUS
Status: DISCONTINUED | OUTPATIENT
Start: 2025-07-18 | End: 2025-07-20 | Stop reason: HOSPADM

## 2025-07-18 RX ORDER — HYDROMORPHONE HCL IN WATER/PF 6 MG/30 ML
0.2 PATIENT CONTROLLED ANALGESIA SYRINGE INTRAVENOUS
Refills: 0 | Status: DISCONTINUED | OUTPATIENT
Start: 2025-07-18 | End: 2025-07-18

## 2025-07-18 RX ORDER — AMOXICILLIN 250 MG
1 CAPSULE ORAL 2 TIMES DAILY
Status: DISCONTINUED | OUTPATIENT
Start: 2025-07-18 | End: 2025-07-18

## 2025-07-18 RX ORDER — GABAPENTIN 300 MG/1
600 CAPSULE ORAL 2 TIMES DAILY
COMMUNITY

## 2025-07-18 RX ORDER — PREGABALIN 75 MG/1
75 CAPSULE ORAL DAILY
Status: CANCELLED | OUTPATIENT
Start: 2025-07-18

## 2025-07-18 RX ORDER — CARVEDILOL 25 MG/1
25 TABLET ORAL 2 TIMES DAILY WITH MEALS
Status: DISCONTINUED | OUTPATIENT
Start: 2025-07-18 | End: 2025-07-18

## 2025-07-18 RX ORDER — HYDROMORPHONE HYDROCHLORIDE 1 MG/ML
0.5 INJECTION, SOLUTION INTRAMUSCULAR; INTRAVENOUS; SUBCUTANEOUS ONCE
Status: COMPLETED | OUTPATIENT
Start: 2025-07-18 | End: 2025-07-18

## 2025-07-18 RX ORDER — HYDROMORPHONE HYDROCHLORIDE 1 MG/ML
0.5 INJECTION, SOLUTION INTRAMUSCULAR; INTRAVENOUS; SUBCUTANEOUS EVERY 30 MIN PRN
Refills: 0 | Status: DISCONTINUED | OUTPATIENT
Start: 2025-07-18 | End: 2025-07-18

## 2025-07-18 RX ORDER — PROCHLORPERAZINE MALEATE 5 MG/1
5 TABLET ORAL EVERY 6 HOURS PRN
Status: DISCONTINUED | OUTPATIENT
Start: 2025-07-18 | End: 2025-07-18

## 2025-07-18 RX ORDER — ACETAMINOPHEN 500 MG
1000 TABLET ORAL 3 TIMES DAILY
COMMUNITY

## 2025-07-18 RX ORDER — ONDANSETRON 2 MG/ML
4 INJECTION INTRAMUSCULAR; INTRAVENOUS EVERY 30 MIN PRN
Status: DISCONTINUED | OUTPATIENT
Start: 2025-07-18 | End: 2025-07-18 | Stop reason: HOSPADM

## 2025-07-18 RX ORDER — SENNOSIDES 8.6 MG
325 CAPSULE ORAL DAILY
Status: DISCONTINUED | OUTPATIENT
Start: 2025-07-18 | End: 2025-07-20 | Stop reason: HOSPADM

## 2025-07-18 RX ORDER — CALCIUM CARBONATE 500 MG/1
500 TABLET, CHEWABLE ORAL 4 TIMES DAILY PRN
Status: DISCONTINUED | OUTPATIENT
Start: 2025-07-18 | End: 2025-07-20 | Stop reason: HOSPADM

## 2025-07-18 RX ORDER — ONDANSETRON 4 MG/1
4 TABLET, ORALLY DISINTEGRATING ORAL EVERY 6 HOURS PRN
Status: DISCONTINUED | OUTPATIENT
Start: 2025-07-18 | End: 2025-07-20 | Stop reason: HOSPADM

## 2025-07-18 RX ORDER — ACETAMINOPHEN 325 MG/1
975 TABLET ORAL EVERY 8 HOURS
Status: DISCONTINUED | OUTPATIENT
Start: 2025-07-18 | End: 2025-07-18

## 2025-07-18 RX ORDER — OXYCODONE HYDROCHLORIDE 5 MG/1
5 TABLET ORAL EVERY 6 HOURS PRN
Refills: 0 | Status: COMPLETED | OUTPATIENT
Start: 2025-07-18 | End: 2025-07-18

## 2025-07-18 RX ORDER — FENTANYL CITRATE 50 UG/ML
50 INJECTION, SOLUTION INTRAMUSCULAR; INTRAVENOUS EVERY 5 MIN PRN
Refills: 0 | Status: DISCONTINUED | OUTPATIENT
Start: 2025-07-18 | End: 2025-07-18 | Stop reason: HOSPADM

## 2025-07-18 RX ORDER — OXYCODONE HYDROCHLORIDE 5 MG/1
5 TABLET ORAL EVERY 4 HOURS PRN
Refills: 0 | Status: DISCONTINUED | OUTPATIENT
Start: 2025-07-18 | End: 2025-07-18

## 2025-07-18 RX ORDER — SENNOSIDES 8.6 MG
325 CAPSULE ORAL DAILY
Qty: 30 TABLET | Refills: 0 | Status: SHIPPED | OUTPATIENT
Start: 2025-07-18 | End: 2025-07-31

## 2025-07-18 RX ORDER — HYDROXYZINE HYDROCHLORIDE 10 MG/1
10 TABLET, FILM COATED ORAL EVERY 6 HOURS PRN
Status: DISCONTINUED | OUTPATIENT
Start: 2025-07-18 | End: 2025-07-20 | Stop reason: HOSPADM

## 2025-07-18 RX ORDER — POLYETHYLENE GLYCOL 3350 17 G/17G
17 POWDER, FOR SOLUTION ORAL DAILY
Status: DISCONTINUED | OUTPATIENT
Start: 2025-07-19 | End: 2025-07-20 | Stop reason: HOSPADM

## 2025-07-18 RX ORDER — SODIUM CHLORIDE, SODIUM LACTATE, POTASSIUM CHLORIDE, CALCIUM CHLORIDE 600; 310; 30; 20 MG/100ML; MG/100ML; MG/100ML; MG/100ML
INJECTION, SOLUTION INTRAVENOUS CONTINUOUS
Status: DISCONTINUED | OUTPATIENT
Start: 2025-07-18 | End: 2025-07-20 | Stop reason: HOSPADM

## 2025-07-18 RX ORDER — CARVEDILOL 25 MG/1
25 TABLET ORAL ONCE
Status: COMPLETED | OUTPATIENT
Start: 2025-07-18 | End: 2025-07-18

## 2025-07-18 RX ORDER — HYDROXYZINE HYDROCHLORIDE 25 MG/1
25 TABLET, FILM COATED ORAL EVERY 6 HOURS PRN
Qty: 30 TABLET | Refills: 0 | Status: SHIPPED | OUTPATIENT
Start: 2025-07-18

## 2025-07-18 RX ADMIN — HYDROMORPHONE HYDROCHLORIDE 0.5 MG: 1 INJECTION, SOLUTION INTRAMUSCULAR; INTRAVENOUS; SUBCUTANEOUS at 05:16

## 2025-07-18 RX ADMIN — CEFAZOLIN SODIUM 2 G: 2 SOLUTION INTRAVENOUS at 21:56

## 2025-07-18 RX ADMIN — ASPIRIN 325 MG: 325 TABLET ORAL at 16:48

## 2025-07-18 RX ADMIN — OXYCODONE HYDROCHLORIDE 5 MG: 5 TABLET ORAL at 15:46

## 2025-07-18 RX ADMIN — OXYCODONE HYDROCHLORIDE 5 MG: 5 TABLET ORAL at 22:14

## 2025-07-18 RX ADMIN — SODIUM CHLORIDE: 0.9 INJECTION, SOLUTION INTRAVENOUS at 10:20

## 2025-07-18 RX ADMIN — HYDROMORPHONE HYDROCHLORIDE 0.5 MG: 1 INJECTION, SOLUTION INTRAMUSCULAR; INTRAVENOUS; SUBCUTANEOUS at 12:35

## 2025-07-18 RX ADMIN — INSULIN ASPART 1 UNITS: 100 INJECTION, SOLUTION INTRAVENOUS; SUBCUTANEOUS at 16:48

## 2025-07-18 RX ADMIN — KETOROLAC TROMETHAMINE 15 MG: 15 INJECTION, SOLUTION INTRAMUSCULAR; INTRAVENOUS at 21:56

## 2025-07-18 RX ADMIN — FAMOTIDINE 20 MG: 20 TABLET, FILM COATED ORAL at 19:50

## 2025-07-18 RX ADMIN — FENTANYL CITRATE 50 MCG: 50 INJECTION INTRAMUSCULAR; INTRAVENOUS at 15:16

## 2025-07-18 RX ADMIN — HYDROMORPHONE HYDROCHLORIDE 0.3 MG: 1 INJECTION, SOLUTION INTRAMUSCULAR; INTRAVENOUS; SUBCUTANEOUS at 04:23

## 2025-07-18 RX ADMIN — ACETAMINOPHEN 975 MG: 325 TABLET ORAL at 16:07

## 2025-07-18 RX ADMIN — HYDROMORPHONE HYDROCHLORIDE 0.5 MG: 1 INJECTION, SOLUTION INTRAMUSCULAR; INTRAVENOUS; SUBCUTANEOUS at 10:23

## 2025-07-18 RX ADMIN — ACETAMINOPHEN 975 MG: 325 TABLET ORAL at 08:33

## 2025-07-18 RX ADMIN — KETOROLAC TROMETHAMINE 15 MG: 15 INJECTION, SOLUTION INTRAMUSCULAR; INTRAVENOUS at 16:07

## 2025-07-18 RX ADMIN — HYDROXYZINE HYDROCHLORIDE 10 MG: 10 TABLET, FILM COATED ORAL at 22:14

## 2025-07-18 RX ADMIN — SENNOSIDES AND DOCUSATE SODIUM 1 TABLET: 50; 8.6 TABLET ORAL at 19:50

## 2025-07-18 RX ADMIN — OXYCODONE HYDROCHLORIDE 5 MG: 5 TABLET ORAL at 08:34

## 2025-07-18 ASSESSMENT — COLUMBIA-SUICIDE SEVERITY RATING SCALE - C-SSRS
1. IN THE PAST MONTH, HAVE YOU WISHED YOU WERE DEAD OR WISHED YOU COULD GO TO SLEEP AND NOT WAKE UP?: NO
2. HAVE YOU ACTUALLY HAD ANY THOUGHTS OF KILLING YOURSELF IN THE PAST MONTH?: NO
6. HAVE YOU EVER DONE ANYTHING, STARTED TO DO ANYTHING, OR PREPARED TO DO ANYTHING TO END YOUR LIFE?: NO

## 2025-07-18 ASSESSMENT — ACTIVITIES OF DAILY LIVING (ADL)
ADLS_ACUITY_SCORE: 35
ADLS_ACUITY_SCORE: 35
ADLS_ACUITY_SCORE: 31
ADLS_ACUITY_SCORE: 54
ADLS_ACUITY_SCORE: 54
ADLS_ACUITY_SCORE: 35
DEPENDENT_IADLS:: CLEANING;COOKING;LAUNDRY;SHOPPING;MEAL PREPARATION
ADLS_ACUITY_SCORE: 54
ADLS_ACUITY_SCORE: 54
ADLS_ACUITY_SCORE: 35
ADLS_ACUITY_SCORE: 31
ADLS_ACUITY_SCORE: 54
ADLS_ACUITY_SCORE: 31
ADLS_ACUITY_SCORE: 35
ADLS_ACUITY_SCORE: 54
ADLS_ACUITY_SCORE: 35
ADLS_ACUITY_SCORE: 35
ADLS_ACUITY_SCORE: 31
ADLS_ACUITY_SCORE: 35

## 2025-07-18 NOTE — PROGRESS NOTES
[facility] TRANSPORT NOTE  Data:   Reason for Transport:  surgery l hip    Reilly Borja was transported to \[facility]\"" via bed at 1215.  Patient was accompanied by Registered Nurse and Nursing Assistant. Equipment used for transport: None. Family was aware of reason for transport: yes    Action:  Report: given to Peggy    Response:  Patient's condition when transferred off unit was stable and alert.    Doe Mina RN

## 2025-07-18 NOTE — ED NOTES
[facility]   Admission Handoff    The patient is Reilly Borja, 75 year old who arrived in the ED by AMBULANCE from home with a complaint of Fall (Patient trying to walk with crutches at home and fell)  . The patient's current symptoms are new and during this time the symptoms have remained the same. In the ED, patient was diagnosed with   Final diagnoses:   Closed fracture of neck of left femur, initial encounter (H)         Needed?: No    Allergies:    Allergies   Allergen Reactions    Simvastatin Muscle Pain (Myalgia)     Patient states was never an allergy, would like it removed from his chart please       Past Medical Hx:   Past Medical History:   Diagnosis Date    Congestive heart failure (H)     Diabetes (H)     Hypertension     Iron deficiency anemia secondary to inadequate dietary iron intake        Initial vitals were: BP: 104/64  Pulse: 68  Temp: 97.9  F (36.6  C)  Resp: 20  Weight: 108 kg (238 lb)  SpO2: 94 %   Recent vital Signs: /71   Pulse 80   Temp 97.9  F (36.6  C) (Oral)   Resp 15   Wt 108 kg (238 lb)   SpO2 92%   BMI 30.56 kg/m      Elimination Status: Continent: Yes     Activity Level: Total assist/lift    Fall Status: Reason for falls risk:  Mobility  bed/chair alarm on, patient and family education, and activity supervised    Baseline Mental status: WDL  Current Mental Status changes: at basesline    Infection present or suspected this encounter: no  Sepsis suspected: No    Isolation type: n/a    Bariatric equipment needed?: No    In the ED these meds were given:   Medications   HYDROmorphone (PF) (DILAUDID) injection 0.5 mg (0.5 mg Intravenous $Given 7/18/25 8220)   HYDROmorphone (PF) (DILAUDID) injection 0.3 mg (0.3 mg Intravenous $Given 7/18/25 8444)       Drips running?  No    Home pump  No    Current LDAs: Peripheral IV: Site left AC; Gauge 18g  none     Results:   Labs/Imaging  Ordered and Resulted from Time of ED Arrival Up to the  Time of Departure from the ED  Recent Results (from the past 24 hours)   CBC with platelets   Result Value Ref Range    WBC Count 8.6 4.0 - 11.0 10e3/uL    RBC Count 4.53 4.40 - 5.90 10e6/uL    Hemoglobin 12.8 (L) 13.3 - 17.7 g/dL    Hematocrit 41.0 40.0 - 53.0 %    MCV 91 78 - 100 fL    MCH 28.3 26.5 - 33.0 pg    MCHC 31.2 (L) 31.5 - 36.5 g/dL    RDW 15.4 (H) 10.0 - 15.0 %    Platelet Count 291 150 - 450 10e3/uL   Basic metabolic panel   Result Value Ref Range    Sodium 135 135 - 145 mmol/L    Potassium 3.7 3.4 - 5.3 mmol/L    Chloride 96 (L) 98 - 107 mmol/L    Carbon Dioxide (CO2) 25 22 - 29 mmol/L    Anion Gap 14 7 - 15 mmol/L    Urea Nitrogen 18.6 8.0 - 23.0 mg/dL    Creatinine 1.09 0.67 - 1.17 mg/dL    GFR Estimate 71 >60 mL/min/1.73m2    Calcium 8.5 (L) 8.8 - 10.4 mg/dL    Glucose 102 (H) 70 - 99 mg/dL   Phyllis Draw    Narrative    The following orders were created for panel order Phyllis Draw.  Procedure                               Abnormality         Status                     ---------                               -----------         ------                     Extra Blue Top Tube[[id number]]                             Final result                 Please view results for these tests on the individual orders.   Extra Blue Top Tube   Result Value Ref Range    Hold Specimen JIC    Pelvis XR w/ unilateral hip left    Narrative    EXAM: XR PELVIS AND HIP LEFT 1 VIEW  LOCATION: [facility]  DATE: 7/18/2025    INDICATION: Fall  COMPARISON: None.      Impression    IMPRESSION: Acute impacted mildly displaced and angulated left femoral neck fracture. Mild degenerative changes both hips. Fusion hardware partially visualized in the lower lumbar spine. Vascular calcification. Otherwise unremarkable.                 For the majority of the shift this patient's behavior was Green     Cardiac Rhythm: Normal Sinus  Pt needs tele? No  Skin/wound Issues: None    Code Status: Full Code    Pain  control: good    Nausea control: good    Abnormal labs/tests/findings requiring intervention: Hip fx    Patient tested for COVID 19 prior to admission: NO     OBS brochure/video discussed/provided to patient/family: No     Family present during ED course? Yes     Family Comments/Social Situation comments: spouse at bedside, very kind and helpful    Tasks needing completion: None    Jael Pierre RN

## 2025-07-18 NOTE — MEDICATION SCRIBE - ADMISSION MEDICATION HISTORY
Medication Scribe Admission Medication History    Admission medication history is complete. The information provided in this note is only as accurate as the sources available at the time of the update.    Information Source(s): Patient and Family member via in-person    Pertinent Information:  Pt states that he takes most of his medications once a day, even if they say to take twice. Not 100% sure about the tramadol dosage, waiting on confirmation    Changes made to PTA medication list:  Added: INSULIN apart (NOVOLOG PEN) 10 mg TID w/ meals, Tylenol 500 mg PRN, Duloxetine HCL 30 mg Daily, Tramadol 50 mg QID, Gabapentin 600 mg BID, Gabapentin 900 mg HS,  Insulin Glargine 25 units in the PM,   Deleted: Tylenol 500 mg TID, Glucose-Vitamin C 4-6, Hydroxyzine 25 mg, Oxycodone 5 mg, Pregabalin 75 mg,    Changed: Carvedilol 25 mg BID to Carvedilol daily, Insulin Glargine from 36 units to 30 units Q AM, Sacubitril-Valsartan  mg from BID to daily, Torsemide 20 mg from 40 mg BID to 40 mg daily    Allergies reviewed with patient and updates made in EHR: yes    Medication History Completed By: Shant Rushing 7/18/2025 12:08 PM    PTA Med List   Medication Sig Note Last Dose/Taking    acetaminophen (TYLENOL) 325 MG tablet Take 2 tablets (650 mg) by mouth every 4 hours as needed for other (mild pain).  Taking As Needed    acetaminophen (TYLENOL) 500 MG tablet Take 500-1,000 mg by mouth every 6 hours as needed for mild pain.  Past Week    aspirin (ASA) 325 MG EC tablet Take 1 tablet (325 mg) by mouth daily.  Taking    carvedilol (COREG) 25 MG tablet Take 25 mg by mouth daily with food.  7/17/2025 Morning    DULoxetine (CYMBALTA) 30 MG capsule Take 30 mg by mouth daily.  7/17/2025    empagliflozin (JARDIANCE) 25 MG TABS tablet Take 25 mg by mouth every morning  7/16/2025    gabapentin (NEURONTIN) 300 MG capsule Take 600 mg by mouth 2 times daily. 2 capsules (600 mg) morning and afternoon. This is in addition to the bedtime  dose of 900 mg.  7/17/2025 Evening    gabapentin (NEURONTIN) 300 MG capsule Take 900 mg by mouth at bedtime. Take 3 tablets (900 mg) at bedtime. This is in addition to the morning and afternoon doses.  7/17/2025 Bedtime    hydrOXYzine HCl (ATARAX) 25 MG tablet Take 1 tablet (25 mg) by mouth every 6 hours as needed for itching or anxiety (with pain, moderate pain).  Taking As Needed    insulin aspart (NOVOLOG PEN) 100 UNIT/ML pen Inject 10 Units subcutaneously 3 times daily (with meals).  7/17/2025 Evening    insulin glargine (LANTUS VIAL) 100 UNIT/ML vial Inject 25 Units subcutaneously every evening. Inject 25 units in the evening. This is in addition to the 30 units in the morning.  7/17/2025 Evening    insulin glargine (LANTUS VIAL) 100 UNIT/ML vial Inject 30 Units subcutaneously every morning. Inject 30 units in the morning. This is in addition to the 25 units in the evening.  7/17/2025 Evening    metFORMIN (GLUCOPHAGE) 1000 MG tablet Take 1,000 mg by mouth 2 times daily (with meals)  7/17/2025 Evening    oxyCODONE (ROXICODONE) 5 MG tablet Take 1-2 tablets (5-10 mg) by mouth every 4 hours as needed for moderate to severe pain.  Taking As Needed    sacubitril-valsartan (ENTRESTO)  MG per tablet Take 1 tablet by mouth daily.  7/17/2025 Evening    semaglutide (OZEMPIC, 1 MG/DOSE,) 2 MG/1.5ML pen Inject 1 mg Subcutaneous every 7 days Sunday 7/18/2025: Missed last few doses 6/29/2025    senna-docusate (SENOKOT-S/PERICOLACE) 8.6-50 MG tablet Take 1-2 tablets by mouth 2 times daily as needed for constipation. Take while on oral narcotics to prevent or treat constipation.  Taking As Needed    spironolactone (ALDACTONE) 25 MG tablet Take 25 mg by mouth daily.  7/17/2025 Morning    torsemide (DEMADEX) 20 MG tablet Take 40 mg by mouth daily.  7/17/2025 Morning    traMADol (ULTRAM) 50 MG tablet Take 50 mg by mouth 4 times daily as needed for moderate to severe pain.  7/17/2025 Evening

## 2025-07-18 NOTE — ED PROVIDER NOTES
Emergency Department Patient Sign-out       Brief HPI:  This is a 75 year old male signed out to me by Dr. Cruz.  See initial ED Provider note for details of the presentation.          Medical Decision Making  Reilly Borja is a 75 year old male who has left foot Charcot joint, chronic osteomyelitis of the left foot, chronic heart failure, hypertension, Maher, type 2 diabetes, who presents to the ER for evaluation after a fall.  Vital signs reviewed and reassuring.  Patient has a normal neurological exam.  Most concern for hip fracture or dislocation based on exam.  He is neurovascularly intact distal to the hip.  He has no other injuries.  Patient given Dilaudid.  Labs are obtained and are all reassuring.  X-rays independently reviewed by me and radiology report reviewed.  He has a left femoral neck fracture.  Patient and his partner were informed of the findings.  We discussed the patient would be a good candidate for nerve block and anesthesia will come talk to the patient about this.  I have paged orthopedics to discuss.  I will talk to medicine service after discussing with orthopedics.     I discussed the case with Dr. Mcgee of [facility].  Plan would be to do surgery this afternoon or first thing in the morning.  Plan to admit the patient to medicine, but as I was discussing with the patient, he did [facility] me [facility].  Therefore, [facility] to [facility]  [facility], will admit the patient to the medicine service here.  Patient will be signed out to Dr. Vidales pending the above.     [facility]ospital [facility]: [facility] and therefore patient was discussed with Brandy FORD with hospitalist service at [facility] who is in agreement with excepting the patient for admission.        Exam:   Patient Vitals for the past 24 hrs:   BP Temp Temp src Pulse Resp SpO2 Weight   07/18/25 0435 120/73 -- -- -- -- -- --   07/18/25 0405  118/61 -- -- -- -- 94 % --   07/18/25 0348 104/64 97.9  F (36.6  C) Oral 68 20 -- 108 kg (238 lb)           ED RESULTS:   Results for orders placed or performed during the hospital encounter of 07/18/25 (from the past 24 hours)   CBC with platelets     Status: Abnormal    Collection Time: 07/18/25  3:56 AM   Result Value Ref Range    WBC Count 8.6 4.0 - 11.0 10e3/uL    RBC Count 4.53 4.40 - 5.90 10e6/uL    Hemoglobin 12.8 (L) 13.3 - 17.7 g/dL    Hematocrit 41.0 40.0 - 53.0 %    MCV 91 78 - 100 fL    MCH 28.3 26.5 - 33.0 pg    MCHC 31.2 (L) 31.5 - 36.5 g/dL    RDW 15.4 (H) 10.0 - 15.0 %    Platelet Count 291 150 - 450 10e3/uL   Basic metabolic panel     Status: Abnormal    Collection Time: 07/18/25  3:56 AM   Result Value Ref Range    Sodium 135 135 - 145 mmol/L    Potassium 3.7 3.4 - 5.3 mmol/L    Chloride 96 (L) 98 - 107 mmol/L    Carbon Dioxide (CO2) 25 22 - 29 mmol/L    Anion Gap 14 7 - 15 mmol/L    Urea Nitrogen 18.6 8.0 - 23.0 mg/dL    Creatinine 1.09 0.67 - 1.17 mg/dL    GFR Estimate 71 >60 mL/min/1.73m2    Calcium 8.5 (L) 8.8 - 10.4 mg/dL    Glucose 102 (H) 70 - 99 mg/dL   [place] Draw     Status: None    Collection Time: 07/18/25  3:56 AM    Narrative    The following orders were created for panel order [place] Draw.  Procedure                               Abnormality         Status                     ---------                               -----------         ------                     Extra Blue Top Tube[[id number]]                             Final result                 Please view results for these tests on the individual orders.   Extra Blue Top Tube     Status: None    Collection Time: 07/18/25  3:56 AM   Result Value Ref Range    Hold Specimen JIC    Pelvis XR w/ unilateral hip left     Status: None    Collection Time: 07/18/25  4:49 AM    Narrative    EXAM: XR PELVIS AND HIP LEFT 1 VIEW  LOCATION: [facility]  DATE: 7/18/2025    INDICATION: Fall  COMPARISON: None.       Impression    IMPRESSION: Acute impacted mildly displaced and angulated left femoral neck fracture. Mild degenerative changes both hips. Fusion hardware partially visualized in the lower lumbar spine. Vascular calcification. Otherwise unremarkable.                 ED MEDICATIONS:   Medications   HYDROmorphone (PF) (DILAUDID) injection 0.5 mg (0.5 mg Intravenous $Given 7/18/25 0516)   HYDROmorphone (PF) (DILAUDID) injection 0.3 mg (0.3 mg Intravenous $Given 7/18/25 0423)         Impression:    ICD-10-CM    1. Closed fracture of neck of left femur, initial encounter (H)  S72.002A           Plan:    Admit to the hospitalist service with orthopedic consultation.      MD Flash Velez David Charles, MD  07/18/25 0680

## 2025-07-18 NOTE — PROGRESS NOTES
[place] [facility] TRANSPORT NOTE  Data:   Reason for Transport:  surgery completed    Reilly Borja was transported from PACU via bed at 1600.  Patient was accompanied by Registered Nurse. Equipment used for transport: None. Family was aware of reason for transport: yes    Action:  Report: received from [facility]    Response:  Patient's condition upon return was alert and stable.    Doe Mina RN

## 2025-07-18 NOTE — ED NOTES
Patient oxygen increased to 5.0 Lpm nasal cannula during block procedure.  Received Dilaudid prior to anesthesia showing up to do block, anesthesia gave precedex, patient dropped oxygen saturations to 80%  Patient able to recover oxygen saturations to mid 90's with increasing oxygen and verbal coaching to take deep breaths.  Patient turned down to 2 LPM nc once procedure complete and patient more alert.   Patient is NOT on oxygen at home.  Placed on oxygen due to pain medication and shallow breathing in bed.

## 2025-07-18 NOTE — H&P
"[facility]    History and Physical - Hospitalist Service       Date of Admission:  7/18/2025    Assessment & Plan      Reilly Borja is a 75 year old male admitted on 7/18/2025. He has a past medical history significant for type 2 diabetes mellitus on insulin, HFpEF, hypertension, dyslipidemia, NAFLD, and Charcot's joint of left foot who presented to the emergency department fur evaluation of left hip pain after a fall, was found to have a femoral neck fracture.     Closed fracture of neck of left femur, initial encounter  Tripped and fell while using crutches, landed on left side with new femoral neck fracture.    X-ray pelvis: \"Acute impacted mildly displaced and angulated left femoral neck fracture. Mild degenerative changes both hips. Fusion hardware partially visualized in the lower lumbar spine. Vascular calcification. Otherwise unremarkable.\"    Case discussed between emergency department and on-call ortho.   - Ortho consulted, anticipate OR later this afternoon   - Hip fracture order set utilized  - NPO prior to surgery, will need consistent carbohydrate diet post-op  - Analgesia: scheduled acetaminophen, prn oxycodone, prn dilaudid, prn Robaxin  - Strict bedrest    Surgery Clearance and RCRI Risk Assessment:    Anesthesia issues: No  Baseline Activity: > 4 METS  Chest Pain: No  Shortness of breath: No  Cardiac Risk Factors/Assessment:                High Risk Surgery: No              History Ischemic Heart Disease: No              History of Congestive Heart Failure: Yes              History of CVA: No              Preoperative Treatment with Insulin: Yes              Preoperative Creatinine greater than 2.0: No              Total Number of Points: 2 = 5 risk of major cardiac event  Per-op chest x-ray and EKG reviewed, no acute concerns that would delay surgery.   - Patient may proceed to surgery without further pre-op optimization needed    Hypoxemia   Was stable on room air " "initially in the emergency department, but later became mildly hypoxic to 89%, new 2L O2 requirement. Patient denies any respiratory symptoms. Does have history of heart failure, lungs with mild crackles but otherwise appears compensated.     Chest x-ray - \"Stable cardiac enlargement and pulmonary venous congestion. Shallow inspiration. The lungs are otherwise clear. No pneumothorax.\"     Suspect patient may have hypoxemia secondary to narcotics, as it occurred after receiving pain medications. May also have an element of mild heart failure contributing, not severe   - Continue supplemental O2 to maintain sats > 91%, wean as able  - Holding diuretics pre-operatively, reassess post-op to see if more aggressive diuresis is needed     Type 2 diabetes mellitus with complication, with long-term current use of insulin  HgbA1c 7.8. Managed prior to admission with Lantus 30U am / 25U pm, metformin 1000 mg bid, and Jardiance 25 mg daily.  - Lantus 15U x1 dose pre-op, can resume home regimen 30U am / 25 U qpm post-op and tolerating oral intake  - Glucose checks q4h while NPO, transition to qid once eating post-op  - Medium sliding scale insulin   - Will need consistent carbohydrate diet once advanced  - Hypoglycemia treatment available    (HFpEF) heart failure with preserved ejection fraction   Essential hypertension  Previously diagnosed, last echo on record in 2023 with EF 50-55%, no wall motion abnormalities. However, patient is on GDMT for HFrEF, so unclear if [facility] that shows EF reduction. Managed prior to admission with carvedilol 25 mg bid (or possibly once daily?), Jardiance 25 mg daily, Entresto  mg bid, spironolactone 25 mg daily, and torsemide 40 mg bid.   Slight crackles on admission exam, otherwise appears compensated and stable pre-operatively.  - Continue home carvedilol at bid dosing with holding parameters  - Holding Jardiance, Entresto, spironolactone, and torsemide " "pre-operatively, resume as able after surgery    Pure hypercholesterolemia  Noted in problem list, not on pharmacotherapy prior to admission.   - Continue with PCP management    Charcot's joint of left foot  Chronic left foot pain  Previously diagnosed, has had multiple surgeries, most [facility] ([facility] not available). Had been using crutches to ambulate since his last surgery. Pain managed prior to admission with prn acetaminophen, and prn oxycodone.   - Will need PT/OT after surgery    Nonalcoholic fatty liver disease  Noted in problem list. Alk phos elevated (229), other LFTs WNL.   - Stable          Diet: NPO for Procedure/Surgery per Anesthesia Guidelines  DVT Prophylaxis: Pneumatic Compression Devices  Freeman Catheter: Not present  Lines: None     Cardiac Monitoring: None  Code Status: Full Code - discussed at time of admission     Clinically Significant Risk Factors Present on Admission          # Hypochloremia: Lowest Cl = 96 mmol/L in last 2 days, will monitor as appropriate  # Hypocalcemia: Lowest Ca = 8.5 mg/dL in last 2 days, will monitor and replace as appropriate         # Hypertension: Noted on problem list          # DMII: A1C = 7.8 % (Ref range: <5.7 %) within past 6 months        # Financial/Environmental Concerns:           Disposition Plan     Medically Ready for Discharge: Anticipated in 2-4 Days         The patient's care was discussed with the Attending Physician, Dr. Doug Walter, Patient, Patient's Family, and discussed during multidisciplinary rounds.       Joan Soliz PA-C  Hospitalist Service  [facility]  Securely message with [employer] (more info)  Text page via [employer] Capture Paging/Directory     ______________________________________________________________________    Chief Complaint   \"I fell using my crutches and broke my hip.\"    History is obtained from the patient, review of EMR, and emergency department documentation.     History " "of Present Illness   Reilly Borja is a 75 year old male with a past medical history significant for type 2 diabetes mellitus on insulin, HFpEF, hypertension, dyslipidemia, NAFLD, and Charcot's joint of left foot who presented to the emergency department fur evaluation of left hip pain after a fall, was found to have a femoral neck fracture.     Patient has crutches to ambulate at baseline due to left Charcot foot.  He had been asleep but woke in the middle of the night to use the bathroom, and as he was ambulating with his crutches he lost his balance and fell backwards, landing on his posterior left hip.  He felt immediate pain in his left hip and leg as well as a sensation of numbness, \"like my leg was dead.\"  He was brought to the emergency department where workup revealed an acute left femoral neck fracture.    He denies injuring any other area related to the fall; did not hit his head denies any loss of consciousness.    No other areas of pain.  Does not think he was dizzy or lightheaded prior to the fall, no syncope.    Review of systems  He does not always sleep very well due to pain in his foot.  He has known heart failure but reports that his breathing is at baseline.  Review of systems is negative.      Past Medical History    Past Medical History:   Diagnosis Date    Congestive heart failure (H)     Diabetes (H)     Hypertension     Iron deficiency anemia secondary to inadequate dietary iron intake        Past Surgical History   Past Surgical History:   Procedure Laterality Date    ARTHRODESIS FOOT Left 9/13/2023    Procedure: Left Talonavicular and Calcanecuboid Joint Fusion;  Surgeon: Orlando Duarte MD;  Location: [facility] OR    ARTHROSCOPY KNEE Left     IRRIGATION AND DEBRIDEMENT LOWER EXTREMITY, COMBINED Left 9/17/2021    Procedure: IRRIGATION AND DEBRIDEMENT  WITH BONE BIOPSY LEFT FOOT;  Surgeon: Julius Campbell DO;  Location: [facility] Main OR    OTHER SURGICAL HISTORY Left     " surgery for charcot of left foot    REMOVE HARDWARE LOWER EXTREMITY Left 9/17/2021    Procedure: REMOVAL OF HARDWARE LEFT FOOT DEEP;  Surgeon: Julius Campbell DO;  Location: [facility] LUMBAR SPINE FUSN,POST INTRBDY Right 02/06/2017    Procedure: RIGHT L5-S1 TRANSFORAMINAL LUMBAR INTERBODY FUSION;  Surgeon: Rajiv Zavala MD;  Location: [facility];  Service: Spine       Prior to Admission Medications   Prior to Admission Medications   Prescriptions Last Dose Informant Patient Reported? Taking?   acetaminophen (TYLENOL) 500 MG tablet   No No   Sig: Take 2 tablets (1,000 mg) by mouth every 8 hours   Patient taking differently: Take 1,000 mg by mouth every 6 hours as needed   carvedilol (COREG) 25 MG tablet   Yes No   Sig: Take 25 mg by mouth 2 times daily (with meals)   empagliflozin (JARDIANCE) 25 MG TABS tablet   Yes No   Sig: Take 25 mg by mouth every morning   glucose-vitamin C 4-6 GM-MG CHEW   Yes No   Sig: Take 1 tablet by mouth every hour as needed   hydrOXYzine (ATARAX) 25 MG tablet   No No   Sig: Take 1 tablet (25 mg) by mouth 3 times daily as needed for itching, anxiety or other (pain)   insulin glargine (LANTUS VIAL) 100 UNIT/ML vial   Yes No   Sig: Inject 36 Units Subcutaneous 2 times daily Inject 36 units in the morning and 34 units in the evening.   metFORMIN (GLUCOPHAGE) 1000 MG tablet  Self Yes No   Sig: Take 1,000 mg by mouth 2 times daily (with meals)   oxyCODONE (ROXICODONE) 5 MG tablet   No No   Sig: Take 1-2 tablets (5-10 mg) by mouth every 4 hours as needed for moderate to severe pain   pregabalin (LYRICA) 75 MG capsule   No No   Sig: Take 1 capsule (75 mg) by mouth daily   sacubitril-valsartan (ENTRESTO)  MG per tablet   Yes No   Sig: Take 1 tablet by mouth 2 times daily   semaglutide (OZEMPIC, 1 MG/DOSE,) 2 MG/1.5ML pen   Yes No   Sig: Inject 1 mg Subcutaneous every 7 days Sunday   spironolactone (ALDACTONE) 25 MG tablet   Yes No   Sig: Take 25 mg by  mouth   torsemide (DEMADEX) 20 MG tablet   Yes No   Sig: Take 40 mg by mouth 2 times daily      Facility-Administered Medications: None        Review of Systems    The 10 point Review of Systems is negative other than noted in the HPI or here.     Social History   I have reviewed this patient's social history and updated it with pertinent information if needed.  Social History     Tobacco Use    Smoking status: Never     Passive exposure: Never    Smokeless tobacco: Never   Vaping Use    Vaping status: Never Used   Substance Use Topics    Alcohol use: Not Currently    Drug use: Never         Family History   I have reviewed this patient's family history and updated it with pertinent information if needed.  Family History   Problem Relation Age of Onset    Venous thrombosis Mother     Diabetes Mother     Anesthesia Reaction No family hx of         Physical Exam   Vital Signs: Temp: 97.9  F (36.6  C) Temp src: Oral BP: 118/63 Pulse: 88   Resp: 18 SpO2: 92 % O2 Device: None (Room air) Oxygen Delivery: 2 LPM  Weight: 238 lbs 0 oz    Constitutional: Alert, oriented, cooperative. No apparent distress, appears nontoxic. Speaking in full coherent sentences.     Eyes: Eyes are clear, pupils are reactive. No scleral icterus.    HEENT: Oropharynx is clear and moist, no lesions. Normocephalic, no evidence of cranial trauma.      Cardiovascular: Regular rhythm and rate, normal S1 and S2. No murmur, rubs, or gallops. Peripheral pulses intact bilaterally. Non-pitting left lower extremity edema, but no edema on the right.     Respiratory: Non-labored breathing on 2L O2. Fine bibasilar crackles present. No wheezing or rhonchi.     GI: Soft, non-distended. Non-tender, no rebound or guarding. No hepatosplenomegaly or masses appreciated. Normal bowel sounds.     Musculoskeletal: Left foot with post-surgical deformity and swelling. Normal muscle bulk and tone. Distal CMS intact.      Skin: Warm and dry, no rashes or ecchymoses. No  mottling of skin.      Neurologic: Patient moves all extremities. Gross strength and sensation are equal bilaterally.    Genitourinary: Deferred      Medical Decision Making       85 MINUTES SPENT BY ME on the date of service doing chart review, history, exam, documentation & further activities per the note.  MANAGEMENT DISCUSSED with the following over the past 24 hours: Dr. Doug Walter   NOTE(S)/MEDICAL RECORDS REVIEWED over the past 24 hours: emergency department notes 7/17-7/18/25  Tests ORDERED & REVIEWED in the past 24 hours:  - CMP  - CBC  - LFTs      Data     I have personally reviewed the following data over the past 24 hrs:    8.6  \   12.8 (L)   / 291     135 96 (L) 18.6 /  102 (H)   3.7 25 1.09 \     ALT: 16 AST: 34 AP: 229 (H) TBILI: 0.5   ALB: 3.7 TOT PROTEIN: 7.7 LIPASE: N/A     TSH: N/A T4: N/A A1C: 7.8 (H)     INR:  1.01 PTT:  N/A   D-dimer:  N/A Fibrinogen:  N/A       Imaging results reviewed over the past 24 hrs:   Recent Results (from the past 24 hours)   Pelvis XR w/ unilateral hip left    Narrative    EXAM: XR PELVIS AND HIP LEFT 1 VIEW  LOCATION: [facility]  DATE: 7/18/2025    INDICATION: Fall  COMPARISON: None.      Impression    IMPRESSION: Acute impacted mildly displaced and angulated left femoral neck fracture. Mild degenerative changes both hips. Fusion hardware partially visualized in the lower lumbar spine. Vascular calcification. Otherwise unremarkable.             POC US GUIDANCE NEEDLE PLACEMENT    Impression    The visualized anatomic structures appeared normal. There were no apparent pathologic findings.     XR Chest Port 1 View    Narrative    EXAM: XR CHEST PORT 1 VIEW  LOCATION: [facility]  DATE: 7/18/2025    INDICATION: Hypoxemia. History of heart failure.  COMPARISON: 9/28/2021.      Impression    IMPRESSION: Stable cardiac enlargement and pulmonary venous congestion. Shallow inspiration. The lungs are otherwise clear.  No pneumothorax.

## 2025-07-18 NOTE — CONSULTS
[facility] Consultation    Consultation - [facility]  Level of consult: Consult, follow and place orders    Reilly Borja,  1950, MRN [id number]     Admitting Dx: Closed fracture of neck of left femur, initial encounter (H) [S72.002A]     [facility]     Code status:  Prior     Extended Emergency Contact Information  Primary Emergency Contact: [place]  Address: [address] [place] [address]  Home Phone: [phone]  Mobile Phone: [phone]  Relation: Significant other     Assessment:  Left Femoral Neck Fracture    Plan:  Patient discussed with DARRIAN Gu, who plans to do a total hip arthroplasty later this afternoon.   Patient to remain NPO   Continue cares and pain management     Principal Problem:    Closed fracture of neck of left femur, initial encounter (H)  Active Problems:    Essential hypertension    Type 2 diabetes mellitus with complication, with long-term current use of insulin (H)    (HFpEF) heart failure with preserved ejection fraction (H)    Nonalcoholic fatty liver disease    Pure hypercholesterolemia    Charcot's joint of left foot       Chief Complaint  Left hip pain after fall     HPI  We have been requested by [facility] medicine to evaluate Reilly Borja who is a 75 year old year old male PMH significant for hypertension, type II Diabetes, left charcot foot and chronic osteomyelitis for left hip pain after a fall that occurred earlier this morning. He was with crutches secondary to his left foot and ankle and was ambulating to the bathroom when he tripped and fell onto his left hip. He had immediate pain and was unable to get back up and was therefore transported to the ED where he was found to have a left femoral neck fracture.      History is obtained from the patient     Past Medical History  Past Medical History:   Diagnosis Date    Congestive heart failure (H)      Diabetes (H)     Hypertension     Iron deficiency anemia secondary to inadequate dietary iron intake        Surgical History  Past Surgical History:   Procedure Laterality Date    ARTHRODESIS FOOT Left 9/13/2023    Procedure: Left Talonavicular and Calcanecuboid Joint Fusion;  Surgeon: Orlando Duarte MD;  [facility] OR    ARTHROSCOPY KNEE Left     IRRIGATION AND DEBRIDEMENT LOWER EXTREMITY, COMBINED Left 9/17/2021    Procedure: IRRIGATION AND DEBRIDEMENT  WITH BONE BIOPSY LEFT FOOT;  Surgeon: Julius Campbell DO;  Location: [facility] Main OR    OTHER SURGICAL HISTORY Left     surgery for charcot of left foot    REMOVE HARDWARE LOWER EXTREMITY Left 9/17/2021    Procedure: REMOVAL OF HARDWARE LEFT FOOT DEEP;  Surgeon: Julius Campbell DO;  Location: [facility] LUMBAR SPINE FUSN,POST INTRBDY Right 02/06/2017    Procedure: RIGHT L5-S1 TRANSFORAMINAL LUMBAR INTERBODY FUSION;  Surgeon: Rajiv Zavala MD;  Location: [facility];  Service: Spine        Social History  Social History     Socioeconomic History    Marital status: Single     Spouse name: Not on file    Number of children: Not on file    Years of education: Not on file    Highest education level: Not on file   Occupational History    Not on file   Tobacco Use    Smoking status: Never     Passive exposure: Never    Smokeless tobacco: Never   Vaping Use    Vaping status: Never Used   Substance and Sexual Activity    Alcohol use: Not Currently    Drug use: Never    Sexual activity: Yes     Partners: Female   Other Topics Concern    Parent/sibling w/ CABG, MI or angioplasty before 65F 55M? Not Asked   Social History Narrative    Not on file     Social Drivers of Health     Financial Resource Strain: Low Risk  (7/18/2025)    Financial Resource Strain     Within the past 12 months, have you or your family members you live with been unable to get utilities (heat, electricity) when it was really needed?: No   Food  Insecurity: Low Risk  (7/18/2025)    Food Insecurity     Within the past 12 months, did you worry that your food would run out before you got money to buy more?: No     Within the past 12 months, did the food you bought just not last and you didn t have money to get more?: No   Transportation Needs: Low Risk  (7/18/2025)    Transportation Needs     Within the past 12 months, has lack of transportation kept you from medical appointments, getting your medicines, non-medical meetings or appointments, work, or from getting things that you need?: No   Physical Activity: Not on file   Stress: Not on file   Social Connections: Not on file   Interpersonal Safety: Low Risk  (7/18/2025)    Interpersonal Safety     Do you feel physically and emotionally safe where you currently live?: Yes     Within the past 12 months, have you been hit, slapped, kicked or otherwise physically hurt by someone?: No     Within the past 12 months, have you been humiliated or emotionally abused in other ways by your partner or ex-partner?: No   Housing Stability: Low Risk  (7/18/2025)    Housing Stability     Do you have housing? : Yes     Are you worried about losing your housing?: No       Family History  Family History   Problem Relation Age of Onset    Venous thrombosis Mother     Diabetes Mother     Anesthesia Reaction No family hx of         Allergies:  Simvastatin      Current Medications:  Current Facility-Administered Medications   Medication Dose Route Frequency Provider Last Rate Last Admin    HYDROmorphone (PF) (DILAUDID) injection 0.5 mg  0.5 mg Intravenous Q30 Min PRN Milton Cruz MD   0.5 mg at 07/18/25 0516       Review of Systems:  A comprehensive review of systems was performed and found to be negative except as described in this note    Physical Exam:  Temp:  [97.9  F (36.6  C)] 97.9  F (36.6  C)  Pulse:  [68-88] 88  Resp:  [6-20] 18  BP: (102-137)/(43-75) 118/63  SpO2:  [91 %-96 %] 92 %    Exam:  Constitutional: healthy,  "alert, no distress  Respiratory: Nonlabored breathing, nasal cannula in place  Musculoskeletal: Left lower extremity shortened and externally rotated, ROM deferred due to pain; left ankle with swelling and healed incisions, mild erythema of the left foot at baseline per patient, sensation intact  Neurologic: Answers questions appropriately        Pertinent Labs  Lab Results: personally reviewed.  Lab Results   Component Value Date    WBC 8.6 07/18/2025    HGB 12.8 (L) 07/18/2025    HCT 41.0 07/18/2025    MCV 91 07/18/2025     07/18/2025     No results for input(s): \"INR\" in the last 168 hours.    Pertinent Radiology  Radiology Results: images and radiology report reviewed  Recent Results (from the past 24 hours)   Pelvis XR w/ unilateral hip left    Narrative    EXAM: XR PELVIS AND HIP LEFT 1 VIEW  LOCATION: [facility]  DATE: 7/18/2025    INDICATION: Fall  COMPARISON: None.      Impression    IMPRESSION: Acute impacted mildly displaced and angulated left femoral neck fracture. Mild degenerative changes both hips. Fusion hardware partially visualized in the lower lumbar spine. Vascular calcification. Otherwise unremarkable.             POC US GUIDANCE NEEDLE PLACEMENT    Impression    The visualized anatomic structures appeared normal. There were no apparent pathologic findings.     XR Chest Port 1 View    Narrative    EXAM: XR CHEST PORT 1 VIEW  LOCATION: [facility]  DATE: 7/18/2025    INDICATION: Hypoxemia. History of heart failure.  COMPARISON: 9/28/2021.      Impression    IMPRESSION: Stable cardiac enlargement and pulmonary venous congestion. Shallow inspiration. The lungs are otherwise clear. No pneumothorax.       Attestation:  I have reviewed today's vital signs, notes, medications, labs and imaging.     Leslie Delgado PA-C    "

## 2025-07-18 NOTE — ED PROVIDER NOTES
History     Chief Complaint   Patient presents with    Fall     Patient trying to walk with crutches at home and fell     HPI  Reilly Borja is a 75 year old male who has left foot Charcot joint, chronic osteomyelitis of the left foot, chronic heart failure, hypertension, Maher, type 2 diabetes, who presents to the ER for evaluation after a fall.  Patient states that he got up to use the bathroom and was using crutches when he lost his balance on the crutches and fell onto his left hip.  He was not able to get up.  He is complaining of pain in his left hip.  He did not hit his head.  He denies neck or back pain.  No abdominal pain.  No chest pain or trouble breathing.  No numbness or tingling.  No new weakness no pain other than the left hip.    Allergies:  Allergies   Allergen Reactions    Simvastatin Muscle Pain (Myalgia)     Patient states was never an allergy, would like it removed from his chart please       Problem List:    Patient Active Problem List    Diagnosis Date Noted    Pain in left foot 07/18/2023     Priority: Medium    Charcot's joint of left foot 09/17/2021     Priority: Medium    Status post debridement 09/17/2021     Priority: Medium    Iron deficiency anemia, unspecified 09/16/2021     Priority: Medium    Vitreous degeneration of right eye 09/16/2021     Priority: Medium    Hx of gout 09/16/2021     Priority: Medium    Pain in left ankle 09/16/2021     Priority: Medium    Personal history of colon polyps, unspecified 09/16/2021     Priority: Medium    Astigmatism 09/16/2021     Priority: Medium    Blepharochalasis 09/16/2021     Priority: Medium    Cataract 09/16/2021     Priority: Medium    Nonalcoholic fatty liver disease 09/16/2021     Priority: Medium    Overweight 09/16/2021     Priority: Medium    Presbyopia 09/16/2021     Priority: Medium    Pure hypercholesterolemia 09/16/2021     Priority: Medium    Chronic heart failure, unspecified heart failure type (H) 06/03/2021     Priority:  Medium    Essential hypertension 11/16/2019     Priority: Medium    Type 2 diabetes mellitus with complication, with long-term current use of insulin (H) 11/16/2019     Priority: Medium    S/P spinal surgery 11/16/2019     Priority: Medium    Lumbar radiculopathy 02/06/2017     Priority: Medium        Past Medical History:    Past Medical History:   Diagnosis Date    Congestive heart failure (H)     Diabetes (H)     Hypertension     Iron deficiency anemia secondary to inadequate dietary iron intake        Past Surgical History:    Past Surgical History:   Procedure Laterality Date    ARTHRODESIS FOOT Left 9/13/2023    Procedure: Left Talonavicular and Calcanecuboid Joint Fusion;  Surgeon: Orlando Duarte MD;  Location: [facility] OR    ARTHROSCOPY KNEE Left     IRRIGATION AND DEBRIDEMENT LOWER EXTREMITY, COMBINED Left 9/17/2021    Procedure: IRRIGATION AND DEBRIDEMENT  WITH BONE BIOPSY LEFT FOOT;  Surgeon: Julius Campbell DO;  Location: [facility] OR    OTHER SURGICAL HISTORY Left     surgery for charcot of left foot    REMOVE HARDWARE LOWER EXTREMITY Left 9/17/2021    Procedure: REMOVAL OF HARDWARE LEFT FOOT DEEP;  Surgeon: Julius Campbell DO;  Location: [facility] LUMBAR SPINE FUSN,POST INTRBDY Right 02/06/2017    Procedure: RIGHT L5-S1 TRANSFORAMINAL LUMBAR INTERBODY FUSION;  Surgeon: Rajiv Zavala MD;  Location: [facility];  Service: Spine       Family History:    Family History   Problem Relation Age of Onset    Venous thrombosis Mother     Diabetes Mother     Anesthesia Reaction No family hx of        Social History:  Marital Status:  Single [1]  Social History     Tobacco Use    Smoking status: Never     Passive exposure: Never    Smokeless tobacco: Never   Vaping Use    Vaping status: Never Used   Substance Use Topics    Alcohol use: Not Currently    Drug use: Never        Medications:    acetaminophen (TYLENOL) 500 MG tablet  carvedilol (COREG) 25 MG  tablet  empagliflozin (JARDIANCE) 25 MG TABS tablet  glucose-vitamin C 4-6 GM-MG CHEW  hydrOXYzine (ATARAX) 25 MG tablet  insulin glargine (LANTUS VIAL) 100 UNIT/ML vial  metFORMIN (GLUCOPHAGE) 1000 MG tablet  oxyCODONE (ROXICODONE) 5 MG tablet  pregabalin (LYRICA) 75 MG capsule  sacubitril-valsartan (ENTRESTO)  MG per tablet  semaglutide (OZEMPIC, 1 MG/DOSE,) 2 MG/1.5ML pen  spironolactone (ALDACTONE) 25 MG tablet  torsemide (DEMADEX) 20 MG tablet          Review of Systems  See HPI  Physical Exam   BP: 104/64  Pulse: 68  Temp: 97.9  F (36.6  C)  Resp: 20  Weight: 108 kg (238 lb)  SpO2: 94 %      Physical Exam  /73   Pulse 68   Temp 97.9  F (36.6  C) (Oral)   Resp 20   Wt 108 kg (238 lb)   SpO2 94%   BMI 30.56 kg/m    General: alert, interactive, in no apparent distress  Head: atraumatic  Nose: no rhinorrhea or epistaxis  Ears: no external auditory canal discharge or bleeding.    Eyes: Sclera nonicteric. Conjunctiva noninjected. PERRL, EOMI  Mouth: no tonsillar erythema, edema, or exudate.  Moist mucous membranes  Neck: supple, moving spontaneously no midline cervical tenderness  Lungs: No increased work of breathing.  Clear to auscultation bilaterally.  CV: RRR, peripheral pulses palpable and symmetric  Abdomen: soft, nt, nd, no guarding or rebound.   Extremities: Warm and well-perfused.  Left leg is shortened and rotated.  No pain on palpation of the left lower leg, knee or femur.  There is pain elicited with simple logrolling of the left leg and on palpation of the lateral hip.  I did not test range of motion of the left hip due to pain.  All other extremities are ranged and palpated and are normal  Skin: no rash or diaphoresis  Neuro: CN II-XII grossly intact, strength 5/5 in UE and LEs bilaterally, sensation intact to light touch in UE and LEs bilaterally;     ED Course        Procedures        Critical Care time:  none             Recent Results (from the past 24 hours)   CBC with platelets    Result Value Ref Range    WBC Count 8.6 4.0 - 11.0 10e3/uL    RBC Count 4.53 4.40 - 5.90 10e6/uL    Hemoglobin 12.8 (L) 13.3 - 17.7 g/dL    Hematocrit 41.0 40.0 - 53.0 %    MCV 91 78 - 100 fL    MCH 28.3 26.5 - 33.0 pg    MCHC 31.2 (L) 31.5 - 36.5 g/dL    RDW 15.4 (H) 10.0 - 15.0 %    Platelet Count 291 150 - 450 10e3/uL   Basic metabolic panel   Result Value Ref Range    Sodium 135 135 - 145 mmol/L    Potassium 3.7 3.4 - 5.3 mmol/L    Chloride 96 (L) 98 - 107 mmol/L    Carbon Dioxide (CO2) 25 22 - 29 mmol/L    Anion Gap 14 7 - 15 mmol/L    Urea Nitrogen 18.6 8.0 - 23.0 mg/dL    Creatinine 1.09 0.67 - 1.17 mg/dL    GFR Estimate 71 >60 mL/min/1.73m2    Calcium 8.5 (L) 8.8 - 10.4 mg/dL    Glucose 102 (H) 70 - 99 mg/dL   [place] Draw    Narrative    The following orders were created for panel order [place] Draw.  Procedure                               Abnormality         Status                     ---------                               -----------         ------                     Extra Blue Top Tube[[id number]]                             Final result                 Please view results for these tests on the individual orders.   Extra Blue Top Tube   Result Value Ref Range    Hold Specimen JIC    Pelvis XR w/ unilateral hip left    Narrative    EXAM: XR PELVIS AND HIP LEFT 1 VIEW  LOCATION: [facility]  DATE: 7/18/2025    INDICATION: Fall  COMPARISON: None.      Impression    IMPRESSION: Acute impacted mildly displaced and angulated left femoral neck fracture. Mild degenerative changes both hips. Fusion hardware partially visualized in the lower lumbar spine. Vascular calcification. Otherwise unremarkable.                 Medications   HYDROmorphone (PF) (DILAUDID) injection 0.5 mg (0.5 mg Intravenous $Given 7/18/25 2036)   HYDROmorphone (PF) (DILAUDID) injection 0.3 mg (0.3 mg Intravenous $Given 7/18/25 1489)       Assessments & Plan (with Medical Decision Making)     I have reviewed  the nursing notes.    I have reviewed the findings, diagnosis, plan and need for follow up with the patient.          Medical Decision Making  Reilly Borja is a 75 year old male who has left foot Charcot joint, chronic osteomyelitis of the left foot, chronic heart failure, hypertension, Maher, type 2 diabetes, who presents to the ER for evaluation after a fall.  Vital signs reviewed and reassuring.  Patient has a normal neurological exam.  Most concern for hip fracture or dislocation based on exam.  He is neurovascularly intact distal to the hip.  He has no other injuries.  Patient given Dilaudid.  Labs are obtained and are all reassuring.  X-rays independently reviewed by me and radiology report reviewed.  He has a left femoral neck fracture.  Patient and his partner were informed of the findings.  We discussed the patient would be a good candidate for nerve block and anesthesia will come talk to the patient about this.  I have paged orthopedics to discuss.  I will talk to medicine service after discussing with orthopedics.    I discussed the case with Dr. Mcgee of [facility].  Plan would be to do surgery this afternoon or first thing in the morning.  Plan to admit the patient to medicine, but as I was discussing with the patient, he did inform me [facility].  Therefore, [facility] to [facility].  [facility], will admit the patient to the medicine service here.  Patient will be signed out to Dr. Vidales pending the above.        New Prescriptions    No medications on file       Final diagnoses:   Closed fracture of neck of left femur, initial encounter (H)       7/18/2025   [facility] EMERGENCY DEPT       Milton Cruz MD  07/18/25 0563

## 2025-07-18 NOTE — ED TRIAGE NOTES
Patient at home walking with crutches and fell he is not on a blood thinner he recently had left foot surgery     Triage Assessment (Adult)       Row Name 07/18/25 0350          Triage Assessment    Airway WDL WDL        Respiratory WDL    Respiratory WDL WDL        Skin Circulation/Temperature WDL    Skin Circulation/Temperature WDL WDL        Cardiac WDL    Cardiac WDL WDL        Cognitive/Neuro/Behavioral WDL    Cognitive/Neuro/Behavioral WDL WDL

## 2025-07-18 NOTE — PROCEDURES
07/18/25    PREOP DIAGNOSIS: Displaced left femoral neck fracture   POSTOP DIAGNOSIS: Displaced left femoral neck fracture  PROCEDURE: Left total hip arthroplasty  SURGEON: Mich Vasquez   ASSISTANT: Dinesh Madrigal.  The presence of a PA was necessary for positioning, retraction and exposure, closure, and safe progression of the case  ANESTHESIA: Spinal with sedation   EBL: 100cc  COMPLICATIONS: None apparent   DISPO: Stable to PACU     IMPLANTS: DePuy Actis size 9 standard offset collared femur, 40mm x +8.5mm ceramic femoral head, 40mm x 58mm neutral polyethylene liner, and 58 mm [place] Cup    INDICATIONS: Abdirizak is a 75 year old male who fell and sustained a displaced left femoral neck fracture on 7.17.25.  He has Charcot foot on his left and had been nonweightbearing on his left leg for the last 2 months or so as he recently underwent an attempted fusion.  He has discussed below-knee amputation in the past and is strongly considering this, although has not yet determined his ultimate treatment course.  However, given his fall last night, he came to the emergency department where x-rays showed a displaced left femoral neck fracture.  We discussed treatment options and through shared decision making approach he elected to proceed with a left SHADI, understanding the risks of infection, damage to vessels or nerves, blood clots, instability, leg length discrepancy, ongoing pain and need for revision surgery.     PROCEDURE: Abdirizak was met in the preoperative holding area where consent was reviewed and the left hip was marked.  He was then transferred to the operating theater. After a spinal anesthetic was placed, he was placed in a right lateral decubitus position with hleft side up. All bony prominences were padded, he was secured with a Stulberg hip positioner, and an axillary roll was placed.  A timeout was performed verifying the correct patient, surgery, and location. He received preoperative antibiotics as  well as transexamic acid. The left lower extremity was then prepped and draped in a standard fashion.     We then made an incision in line with a posterior approach to the hip. Dissection was sharply carried down through skin and subcutaneous tissue, and the gluteus fascia incised in line with the incision. After palpating and protecting the sciatic nerve, an east west retractor was placed. We then released the piriformis and short external rotators and then performed a T Capsulotomy.  We evacuated fracture hematoma.  We then evaluated the femoral head fragment.  We freshen up the neck at approximately 6-7 mm proximal to the lesser troch.    We turned our attention to the acetabulum. After placing anterior and posterior retractors, foveal and labral tissue were removed.  We started with a 55mm reamer and sequentially reamed to a 58, in approximately 45 deg of abduction and 25 deg of anteversion. At that point, we had good bleeding bone circumferentially.  A trial cup was placed with good pressfit followed by our final 58mm cup, again with good press fit, and a neutral liner. We then turned our attention to the femur. After using a cookie cutter and canal finder, we broached to a size 9, keeping the stem in approximatley 10-15 deg of anteversion. At that point, we had excellent rotational stability.  We then placed a standard offset neck with and a variety of different length femoral heads.  We felt the +8.5mm head most appropriate which showed leg lengths felt appropriate, the hip was stable in full extension and maximal external rotation, in the sleeping position, and with flexion to 90 degrees and internal rotation to 70 degrees.     We then removed all trial components and thoroughly irrigated the wound. We placed our final size 9 stem.  We impacted our final head and reduced the hip. The wound was instilled with a dilute betadine solution. Capsule and short external rotators were repaired with #5 ethibond,  gluteus fascia with 0-vicryl followed by #1 stratafix, subdermal sutures with 2-0 vicryl, and a running 3-0 subcuticular monocryl. A sterile dressing followed by an abductor pillow was placed and the patient was transferred to the PACU in stable condition.       POSTOPERATIVE PLAN:   1. WBAT left LE with PT for mobilization  2. DVT prophylaxis: ASA 325mg daily x 30 days  3. Perioperative antibiotics.  He has a charcot left foot and cellulitis surrounding his heel.  We will therefore have him go home on keflex 500mg bid for prophylaxis.  4. Follow up in two weeks for a wound check, sooner with questions or concerns    Mich Vasquez MD

## 2025-07-18 NOTE — PROGRESS NOTES
Sent message to Surgeon to clarify any restrictions has to weight bearing r/t l foot. Pt states he has a boot and can only toe touch on l foot when he was using crutches. L foot is very edematous, which is not new. Pt eating and drinking well. No additional PRNs given yet, Has voided without difficulty. No other new concerns at this time

## 2025-07-18 NOTE — PROGRESS NOTES
[facility] ADMISS[facility]    Patient admitted to room 2313 at approximately 0900 via cart from emergency room. Patient was accompanied by significant other and transport tech.     Verbal SBAR report received from Peggy prior to patient arrival.     Patient trasferred to bed via air lizbeth. Patient alert and oriented X 3. Pain is controlled with current analgesics.  Medication(s) being used: narcotic analgesics including oxycodone (Oxycontin, Oxyir).  . Admission vital signs: Blood pressure 126/68, pulse 86, temperature 97.9  F (36.6  C), temperature source Oral, resp. rate 13, weight 108 kg (238 lb), SpO2 94%. Patient was oriented to plan of care, call light, bed controls, tv, telephone, bathroom, and visiting hours.     Risk Assessment    The following safety risks were identified during admission: fall. Yellow risk band applied: YES.     Skin Initial Assessment    This writer admitted this patient and completed a full skin assessment and Kris score in the Adult PCS flowsheet.   Photo documentation of skin problem and/or wound competed via GSOUND application (located under Media):  No    Appropriate interventions initiated as needed.     Secondary skin check completed by Peng.         Education    Patient has a [place] to Observation order: No  Observation education completed and documented: N/A      Doe Mina RN

## 2025-07-18 NOTE — CONSULTS
Care Management Initial Consult    General Information  Assessment completed with: VM-chart review, Patient, Reilly and Significant OtherMadison  Type of CM/SW Visit: Initial Assessment    Primary Care Provider verified and updated as needed: Yes   Readmission within the last 30 days: no previous admission in last 30 days      Reason for Consult: discharge planning  Advance Care Planning: Advance Care Planning Reviewed: no concerns identified          Communication Assessment  Patient's communication style: spoken language (English or Bilingual)    Hearing Difficulty or Deaf: no   Wear Glasses or Blind: no    Cognitive  Cognitive/Neuro/Behavioral: WDL                      Living Environment:   People in home: significant other  Madison  Current living Arrangements: house      Able to return to prior arrangements: other (see comments) (Undetermined at this time)       Family/Social Support:  Care provided by: self  Provides care for: no one  Marital Status: Single  Support system: Significant Other       Madison  Description of Support System: Supportive, Involved    Support Assessment: Adequate family and caregiver support    Current Resources:   Patient receiving home care services: No        Community Resources: None  Equipment currently used at home: crutches, shower chair  Supplies currently used at home: Wound Care Supplies    Employment/Financial:  Employment Status: retired        Financial Concerns: none   Referral to Financial Worker: No       Does the patient's insurance plan have a 3 day qualifying hospital stay waiver?  Yes     Which insurance plan 3 day waiver is available? Alternative insurance waiver    Will the waiver be used for post-acute placement? Undetermined at this time    Lifestyle & Psychosocial Needs:  Social Drivers of Health     Food Insecurity: Low Risk  (7/18/2025)    Food Insecurity     Within the past 12 months, did you worry that your food would run out before you got money to buy more?: No      Within the past 12 months, did the food you bought just not last and you didn t have money to get more?: No   Depression: Not at risk (1/12/2024)    PHQ-2     PHQ-2 Score: 2   Housing Stability: Low Risk  (7/18/2025)    Housing Stability     Do you have housing? : Yes     Are you worried about losing your housing?: No   Tobacco Use: Low Risk  (9/13/2023)    Patient History     Smoking Tobacco Use: Never     Smokeless Tobacco Use: Never     Passive Exposure: Never   Financial Resource Strain: Low Risk  (7/18/2025)    Financial Resource Strain     Within the past 12 months, have you or your family members you live with been unable to get utilities (heat, electricity) when it was really needed?: No   Alcohol Use: Not on file   Transportation Needs: Low Risk  (7/18/2025)    Transportation Needs     Within the past 12 months, has lack of transportation kept you from medical appointments, getting your medicines, non-medical meetings or appointments, work, or from getting things that you need?: No   Physical Activity: Not on file   Interpersonal Safety: Low Risk  (7/18/2025)    Interpersonal Safety     Do you feel physically and emotionally safe where you currently live?: Yes     Within the past 12 months, have you been hit, slapped, kicked or otherwise physically hurt by someone?: No     Within the past 12 months, have you been humiliated or emotionally abused in other ways by your partner or ex-partner?: No   Stress: Not on file   Social Connections: Not on file   Health Literacy: Not on file       Functional Status:  Prior to admission patient needed assistance:   Dependent ADLs:: Independent  Dependent IADLs:: Cleaning, Cooking, Laundry, Shopping, Meal Preparation  Assesssment of Functional Status: Not at  functional baseline    Mental Health Status:  Mental Health Status: No Current Concerns       Chemical Dependency Status:  Chemical Dependency Status: No Current Concerns             Values/Beliefs:  Spiritual,  Cultural Beliefs, Buddhism Practices, Values that affect care: no               Discussed  Partnership in Safe Discharge Planning  document with patient/family: Yes: Patient    Additional Information:    Received Consult for discharge planning and due to high risk for readmission.    Met with the Patient and girlfriend Madison.  The Patient lives with Madison in a 2 level house.  He had surgery May 20, 2025 due to Charcot joint and chronic osteomyelitis of the left foot.  He is able to bear minimal weight on left foot.  At baseline he is independent with ADLs.  Madison assists with IADLs.  He uses crutches to ambulate.  He is able to drive.    Patient reports he got up to use the bathroom and was using crutches when he lost his balance on the crutches and fell onto his left hip sustaining a left hip fracture.  Surgery is scheduled for today.  Care Management will meet with Patient after therapy evaluations if there are discharge needs.       Next Steps:     Care Management will continue to monitor for discharge needs through daily chart reviews and Interdisciplinary Rounds.        Kimberlyn Joseph RN

## 2025-07-19 ENCOUNTER — APPOINTMENT (OUTPATIENT)
Dept: OCCUPATIONAL THERAPY | Facility: CLINIC | Age: 75
End: 2025-07-19
Payer: COMMERCIAL

## 2025-07-19 PROBLEM — Z96.642 STATUS POST TOTAL REPLACEMENT OF LEFT HIP: Status: ACTIVE | Noted: 2025-07-19

## 2025-07-19 LAB
ANION GAP SERPL CALCULATED.3IONS-SCNC: 13 MMOL/L (ref 7–15)
BUN SERPL-MCNC: 29.8 MG/DL (ref 8–23)
CALCIUM SERPL-MCNC: 8.3 MG/DL (ref 8.8–10.4)
CHLORIDE SERPL-SCNC: 97 MMOL/L (ref 98–107)
CREAT SERPL-MCNC: 1.11 MG/DL (ref 0.67–1.17)
EGFRCR SERPLBLD CKD-EPI 2021: 70 ML/MIN/1.73M2
ERYTHROCYTE [DISTWIDTH] IN BLOOD BY AUTOMATED COUNT: 15.4 % (ref 10–15)
GLUCOSE BLDC GLUCOMTR-MCNC: 129 MG/DL (ref 70–99)
GLUCOSE BLDC GLUCOMTR-MCNC: 204 MG/DL (ref 70–99)
GLUCOSE BLDC GLUCOMTR-MCNC: 231 MG/DL (ref 70–99)
GLUCOSE BLDC GLUCOMTR-MCNC: 299 MG/DL (ref 70–99)
GLUCOSE BLDC GLUCOMTR-MCNC: 360 MG/DL (ref 70–99)
GLUCOSE SERPL-MCNC: 242 MG/DL (ref 70–99)
HCO3 SERPL-SCNC: 24 MMOL/L (ref 22–29)
HCT VFR BLD AUTO: 36.1 % (ref 40–53)
HGB BLD-MCNC: 11.4 G/DL (ref 13.3–17.7)
MCH RBC QN AUTO: 28.2 PG (ref 26.5–33)
MCHC RBC AUTO-ENTMCNC: 31.6 G/DL (ref 31.5–36.5)
MCV RBC AUTO: 89 FL (ref 78–100)
PLATELET # BLD AUTO: 283 10E3/UL (ref 150–450)
POTASSIUM SERPL-SCNC: 4.2 MMOL/L (ref 3.4–5.3)
RBC # BLD AUTO: 4.04 10E6/UL (ref 4.4–5.9)
SODIUM SERPL-SCNC: 134 MMOL/L (ref 135–145)
WBC # BLD AUTO: 12.5 10E3/UL (ref 4–11)

## 2025-07-19 PROCEDURE — 97165 OT EVAL LOW COMPLEX 30 MIN: CPT | Mod: GO

## 2025-07-19 PROCEDURE — 250N000013 HC RX MED GY IP 250 OP 250 PS 637: Performed by: FAMILY MEDICINE

## 2025-07-19 PROCEDURE — 250N000013 HC RX MED GY IP 250 OP 250 PS 637: Performed by: ORTHOPAEDIC SURGERY

## 2025-07-19 PROCEDURE — 36415 COLL VENOUS BLD VENIPUNCTURE: CPT | Performed by: ORTHOPAEDIC SURGERY

## 2025-07-19 PROCEDURE — 250N000011 HC RX IP 250 OP 636: Performed by: ORTHOPAEDIC SURGERY

## 2025-07-19 PROCEDURE — 82962 GLUCOSE BLOOD TEST: CPT

## 2025-07-19 PROCEDURE — 80048 BASIC METABOLIC PNL TOTAL CA: CPT | Performed by: ORTHOPAEDIC SURGERY

## 2025-07-19 PROCEDURE — 120N000001 HC R&B MED SURG/OB

## 2025-07-19 PROCEDURE — 99233 SBSQ HOSP IP/OBS HIGH 50: CPT | Performed by: FAMILY MEDICINE

## 2025-07-19 PROCEDURE — 85014 HEMATOCRIT: CPT | Performed by: ORTHOPAEDIC SURGERY

## 2025-07-19 RX ORDER — GABAPENTIN 300 MG/1
600 CAPSULE ORAL 2 TIMES DAILY
Status: DISCONTINUED | OUTPATIENT
Start: 2025-07-19 | End: 2025-07-20 | Stop reason: HOSPADM

## 2025-07-19 RX ORDER — TRAMADOL HYDROCHLORIDE 50 MG/1
50 TABLET ORAL 4 TIMES DAILY PRN
Status: DISCONTINUED | OUTPATIENT
Start: 2025-07-19 | End: 2025-07-19

## 2025-07-19 RX ORDER — TRAMADOL HYDROCHLORIDE 50 MG/1
100 TABLET ORAL 4 TIMES DAILY PRN
Status: DISCONTINUED | OUTPATIENT
Start: 2025-07-19 | End: 2025-07-20 | Stop reason: HOSPADM

## 2025-07-19 RX ORDER — DULOXETIN HYDROCHLORIDE 30 MG/1
30 CAPSULE, DELAYED RELEASE ORAL DAILY
Status: DISCONTINUED | OUTPATIENT
Start: 2025-07-19 | End: 2025-07-20 | Stop reason: HOSPADM

## 2025-07-19 RX ORDER — CEPHALEXIN 500 MG/1
500 CAPSULE ORAL 2 TIMES DAILY
Qty: 30 CAPSULE | Refills: 0 | Status: ON HOLD | OUTPATIENT
Start: 2025-07-19 | End: 2025-07-23

## 2025-07-19 RX ORDER — TRAMADOL HYDROCHLORIDE 50 MG/1
50-100 TABLET ORAL 4 TIMES DAILY PRN
Status: DISCONTINUED | OUTPATIENT
Start: 2025-07-19 | End: 2025-07-19

## 2025-07-19 RX ORDER — GABAPENTIN 300 MG/1
900 CAPSULE ORAL AT BEDTIME
Status: DISCONTINUED | OUTPATIENT
Start: 2025-07-19 | End: 2025-07-20 | Stop reason: HOSPADM

## 2025-07-19 RX ORDER — TRAMADOL HYDROCHLORIDE 50 MG/1
50 TABLET ORAL 4 TIMES DAILY PRN
Status: DISCONTINUED | OUTPATIENT
Start: 2025-07-19 | End: 2025-07-20 | Stop reason: HOSPADM

## 2025-07-19 RX ADMIN — HYDROXYZINE HYDROCHLORIDE 10 MG: 10 TABLET, FILM COATED ORAL at 12:29

## 2025-07-19 RX ADMIN — ACETAMINOPHEN 975 MG: 325 TABLET ORAL at 00:06

## 2025-07-19 RX ADMIN — TRAMADOL HYDROCHLORIDE 100 MG: 50 TABLET, COATED ORAL at 22:20

## 2025-07-19 RX ADMIN — TRAMADOL HYDROCHLORIDE 100 MG: 50 TABLET, COATED ORAL at 12:28

## 2025-07-19 RX ADMIN — CARVEDILOL 25 MG: 25 TABLET, FILM COATED ORAL at 08:11

## 2025-07-19 RX ADMIN — CEFAZOLIN SODIUM 2 G: 2 SOLUTION INTRAVENOUS at 05:19

## 2025-07-19 RX ADMIN — ACETAMINOPHEN 975 MG: 325 TABLET ORAL at 17:26

## 2025-07-19 RX ADMIN — POLYETHYLENE GLYCOL 3350 17 G: 17 POWDER, FOR SOLUTION ORAL at 08:11

## 2025-07-19 RX ADMIN — ASPIRIN 325 MG: 325 TABLET ORAL at 08:16

## 2025-07-19 RX ADMIN — GABAPENTIN 600 MG: 300 CAPSULE ORAL at 09:22

## 2025-07-19 RX ADMIN — GABAPENTIN 600 MG: 300 CAPSULE ORAL at 14:33

## 2025-07-19 RX ADMIN — HYDROXYZINE HYDROCHLORIDE 10 MG: 10 TABLET, FILM COATED ORAL at 22:20

## 2025-07-19 RX ADMIN — METFORMIN HYDROCHLORIDE 1000 MG: 500 TABLET ORAL at 17:26

## 2025-07-19 RX ADMIN — OXYCODONE HYDROCHLORIDE 5 MG: 5 TABLET ORAL at 08:11

## 2025-07-19 RX ADMIN — HYDROMORPHONE HYDROCHLORIDE 0.2 MG: 0.2 INJECTION, SOLUTION INTRAMUSCULAR; INTRAVENOUS; SUBCUTANEOUS at 05:22

## 2025-07-19 RX ADMIN — ACETAMINOPHEN 975 MG: 325 TABLET ORAL at 08:11

## 2025-07-19 RX ADMIN — HYDROMORPHONE HYDROCHLORIDE 0.2 MG: 0.2 INJECTION, SOLUTION INTRAMUSCULAR; INTRAVENOUS; SUBCUTANEOUS at 09:22

## 2025-07-19 RX ADMIN — FAMOTIDINE 20 MG: 20 TABLET, FILM COATED ORAL at 20:32

## 2025-07-19 RX ADMIN — TRAMADOL HYDROCHLORIDE 50 MG: 50 TABLET, COATED ORAL at 18:05

## 2025-07-19 RX ADMIN — GABAPENTIN 900 MG: 300 CAPSULE ORAL at 22:19

## 2025-07-19 RX ADMIN — SENNOSIDES AND DOCUSATE SODIUM 1 TABLET: 50; 8.6 TABLET ORAL at 20:32

## 2025-07-19 RX ADMIN — FAMOTIDINE 20 MG: 20 TABLET, FILM COATED ORAL at 08:16

## 2025-07-19 RX ADMIN — CARVEDILOL 25 MG: 25 TABLET, FILM COATED ORAL at 17:26

## 2025-07-19 RX ADMIN — KETOROLAC TROMETHAMINE 15 MG: 15 INJECTION, SOLUTION INTRAMUSCULAR; INTRAVENOUS at 10:21

## 2025-07-19 RX ADMIN — KETOROLAC TROMETHAMINE 15 MG: 15 INJECTION, SOLUTION INTRAMUSCULAR; INTRAVENOUS at 04:05

## 2025-07-19 RX ADMIN — OXYCODONE HYDROCHLORIDE 5 MG: 5 TABLET ORAL at 04:05

## 2025-07-19 RX ADMIN — HYDROXYZINE HYDROCHLORIDE 10 MG: 10 TABLET, FILM COATED ORAL at 04:05

## 2025-07-19 RX ADMIN — SENNOSIDES AND DOCUSATE SODIUM 1 TABLET: 50; 8.6 TABLET ORAL at 08:21

## 2025-07-19 RX ADMIN — DULOXETINE HYDROCHLORIDE 30 MG: 30 CAPSULE, DELAYED RELEASE PELLETS ORAL at 09:22

## 2025-07-19 ASSESSMENT — ACTIVITIES OF DAILY LIVING (ADL)
ADLS_ACUITY_SCORE: 33
ADLS_ACUITY_SCORE: 35
ADLS_ACUITY_SCORE: 37
ADLS_ACUITY_SCORE: 35
ADLS_ACUITY_SCORE: 33
ADLS_ACUITY_SCORE: 37
ADLS_ACUITY_SCORE: 35
ADLS_ACUITY_SCORE: 37

## 2025-07-19 NOTE — PLAN OF CARE
Goal Outcome Evaluation:      Plan of Care Reviewed With: patient    Overall Patient Progress: improvingOverall Patient Progress: improving     Patient vital signs are at baseline: Yes  Patient able to ambulate as they were prior to admission or with assist devices provided by therapies during their stay:  Yes Stood at bedside using walker. Toe touch weight bear on left foot.   Patient MUST void prior to discharge:  Yes  Patient able to tolerate oral intake:  Yes  Pain has adequate pain control using Oral analgesics:  Yes Complaints of left foot pain are much greater than hip pain.   Does patient have an identified :  Yes  Has goal D/C date and time been discussed with patient:  Yes     O2 at 2 LPM per nasal canula overnight as O2 sats were in the high 80s. O2 sats in mid 90s with O2.

## 2025-07-19 NOTE — PLAN OF CARE
Patient is having severe pain (rating 10/10) in left foot due to not being able to manage pain as he would at home and prior to surgery such as walking, massaging, holding. Reposition several times with pillows. Ice applied to foot per patient request. Gave IV Dilaudid 0.2mg.        0625-- Patient is still having severe left foot pain. He is sitting on the edge of the bed with feet on the floor with ice bag under left foot.

## 2025-07-19 NOTE — PROGRESS NOTES
"[facility] Hospitalist Progress Note           Assessment & Plan        Reilly Borja is a 75 year old male admitted on 7/18/2025. He has a past medical history significant for type 2 diabetes mellitus on insulin, HFpEF, hypertension, dyslipidemia, NAFLD, and Charcot's joint of left foot who presented to the emergency department fur evaluation of left hip pain after a fall, was found to have a femoral neck fracture.      Closed fracture of neck of left femur, initial encounter - s/p SHADI 7/18  Tripped and fell while using crutches, landed on left side with new femoral neck fracture.     X-ray pelvis: \"Acute impacted mildly displaced and angulated left femoral neck fracture. Mild degenerative changes both hips. Fusion hardware partially visualized in the lower lumbar spine. Vascular calcification. Otherwise unremarkable.\"  Case discussed between emergency department and on-call ortho.   - Ortho consulted, anticipate OR later this afternoon   - Hip fracture order set utilized  - NPO prior to surgery, will need consistent carbohydrate diet post-op  - Analgesia: scheduled acetaminophen, prn oxycodone, prn dilaudid, prn Robaxin  - s/p SHADI 7/19, Disposition, anticoagulation and pain control per orthopedics.     - patient requested to continue home tramadol with main pain being due to long-standing charcot foot more than hip fracture/repair.  Resumed home tramadol 7/19.         Hypoxemia   Was stable on room air initially in the emergency department, but later became mildly hypoxic to 89%, new 2L O2 requirement. Patient denies any respiratory symptoms. Does have history of heart failure, lungs with mild crackles but otherwise appears compensated.   Chest x-ray - \"Stable cardiac enlargement and pulmonary venous congestion. Shallow inspiration. The lungs are otherwise clear. No pneumothorax.\"   Suspect patient may have hypoxemia secondary to narcotics, as it occurred after receiving pain medications. May also have an " element of mild heart failure contributing, not severe   - Continue supplemental O2 to maintain sats > 91%, wean as able  - Holding diuretics pre-operatively, reassess post-op to see if more aggressive diuresis is needed   - resolved       Type 2 diabetes mellitus with complication, with long-term current use of insulin  HgbA1c 7.8. Managed prior to admission with Lantus 30U am / 25U pm, metformin 1000 mg bid, and Jardiance 25 mg daily.  - Lantus 15U x1 dose pre-op, can resume home regimen 30U am / 25 U qpm post-op and tolerating oral intake  - Glucose checks q4h while NPO, transition to qid once eating post-op  - Medium sliding scale insulin   - Will need consistent carbohydrate diet once advanced  - Hypoglycemia treatment available  - glucose high AM 7/19, resumed home regimen and added medium dose sliding scale insulin with significant improvement   - following        (HFpEF) heart failure with preserved ejection fraction   Essential hypertension  Previously diagnosed, last echo on record in 2023 with EF 50-55%, no wall motion abnormalities. However, patient is on GDMT for HFrEF, so unclear if [facility] that shows EF reduction. Managed prior to admission with carvedilol 25 mg bid (or possibly once daily?), Jardiance 25 mg daily, Entresto  mg bid, spironolactone 25 mg daily, and torsemide 40 mg bid.   Slight crackles on admission exam, otherwise appears compensated and stable pre-operatively.  - Continue home carvedilol at bid dosing with holding parameters  - Held Jardiance, Entresto, spironolactone, and torsemide pre-operatively- resumed torsemide and spironolactonr 7/20, resume other medications on discharge.         Pure hypercholesterolemia  Noted in problem list, not on pharmacotherapy prior to admission.   - Continue with PCP management       Charcot's joint of left foot  Chronic left foot pain  Previously diagnosed, has had multiple surgeries, most recent [facility] ([facility] not available). Had been using crutches to ambulate since his last surgery. Pain managed prior to admission with prn acetaminophen, and prn oxycodone.   - as above switched to home tramadol 7/19   - Will need PT/OT after surgery       Nonalcoholic fatty liver disease  Noted in problem list. Alk phos elevated (229), other LFTs WNL.   - Stable        DVT Prophylaxis: Pneumatic Compression Devices  Freeman Catheter: Not present  Lines: None     Cardiac Monitoring: None  Code Status: Full Code - discussed at time of admission      Clinically Significant Risk Factors Present on Admission          # Hypochloremia: Lowest Cl = 96 mmol/L in last 2 days, will monitor as appropriate  # Hypocalcemia: Lowest Ca = 8.5 mg/dL in last 2 days, will monitor and replace as appropriate         # Hypertension: Noted on problem list            # DMII: A1C = 7.8 % (Ref range: <5.7 %) within past 6 months        # Financial/Environmental Concerns:                 Diet  Orders Placed This Encounter      Discharge Instruction - Regular Diet Adult      High Consistent Carb (75 g CHO per Meal) Diet                          Disposition Plan        Medically Ready for Discharge: Anticipated Tomorrow                  Doug Walter MD  Hospitalist Service  [facility]  Securely message with the Vocera Web Console (learn more here)  Text page via [employer] Paging/Directory             Interval History:   No new concerns. Pain better on ultram.  Worried about glucose being high.  Discussed that we now resumed his home regimen, improved with this.  Feeling good, hoping for discharge once cleared by ortho.                 Review of Systems:    ROS: 10 point ROS neg other than the symptoms noted above in the HPI.           Medications:   Current active medications and PTA medications reviewed, see medication list for details.            Physical Exam:   Vitals were reviewed  Patient Vitals for the past 24  hrs:   BP Temp Temp src Pulse Resp SpO2   25 1552 111/53 97.9  F (36.6  C) Oral 83 22 (!) 91 %   25 1100 94/46 97.8  F (36.6  C) Oral 82 22 93 %   25 0700 -- 97.8  F (36.6  C) Oral 92 18 94 %   25 0512 112/52 98.1  F (36.7  C) Oral 85 18 96 %   25 0143 109/52 97.6  F (36.4  C) Oral 88 18 95 %   25 -- -- -- -- -- 92 %   25 -- -- -- -- -- (!) 89 %   25 98/44 97.9  F (36.6  C) Oral 84 18 (!) 88 %       Temperatures:  Current - Temp: 97.9  F (36.6  C); Max - Temp  Av.9  F (36.6  C)  Min: 97.6  F (36.4  C)  Max: 98.1  F (36.7  C)  Respiration range: Resp  Av.3  Min: 18  Max: 22  Pulse range: Pulse  Av.7  Min: 82  Max: 92  Blood pressure range: Systolic (24hrs), Av , Min:94 , Max:112   ; Diastolic (24hrs), Av, Min:44, Max:53    Pulse oximetry range: SpO2  Av.3 %  Min: 88 %  Max: 96 %  I/O last 3 completed shifts:  In: 1600 [P.O.:700; I.V.:900]  Out: 325 [Urine:325]    Intake/Output Summary (Last 24 hours) at 2025 1750  Last data filed at 2025 1436  Gross per 24 hour   Intake 700 ml   Output 325 ml   Net 375 ml     EXAM:  General: awake and alert, NAD, oriented x 3  Head: normocephalic  Neck: unremarkable, no lymphadenopathy   HEENT: oropharynx pink and moist    Heart: Regular rate and rhythm, no murmurs, rubs, or gallops  Lungs: clear to auscultation bilaterally with good air movement throughout  Abdomen: soft, non-tender, no masses or organomegaly  Extremities: no edema in lower extremities   Skin unremarkable as visualized.               Data:     Reviewed data:  Recent Results (from the past 24 hours)   Glucose by meter   Result Value Ref Range    GLUCOSE BY METER POCT 429 (H) 70 - 99 mg/dL   Glucose by meter   Result Value Ref Range    GLUCOSE BY METER POCT 415 (H) 70 - 99 mg/dL   Glucose by meter   Result Value Ref Range    GLUCOSE BY METER POCT 360 (H) 70 - 99 mg/dL   Basic metabolic panel   Result Value Ref Range     Sodium 134 (L) 135 - 145 mmol/L    Potassium 4.2 3.4 - 5.3 mmol/L    Chloride 97 (L) 98 - 107 mmol/L    Carbon Dioxide (CO2) 24 22 - 29 mmol/L    Anion Gap 13 7 - 15 mmol/L    Urea Nitrogen 29.8 (H) 8.0 - 23.0 mg/dL    Creatinine 1.11 0.67 - 1.17 mg/dL    GFR Estimate 70 >60 mL/min/1.73m2    Calcium 8.3 (L) 8.8 - 10.4 mg/dL    Glucose 242 (H) 70 - 99 mg/dL   CBC with platelets   Result Value Ref Range    WBC Count 12.5 (H) 4.0 - 11.0 10e3/uL    RBC Count 4.04 (L) 4.40 - 5.90 10e6/uL    Hemoglobin 11.4 (L) 13.3 - 17.7 g/dL    Hematocrit 36.1 (L) 40.0 - 53.0 %    MCV 89 78 - 100 fL    MCH 28.2 26.5 - 33.0 pg    MCHC 31.6 31.5 - 36.5 g/dL    RDW 15.4 (H) 10.0 - 15.0 %    Platelet Count 283 150 - 450 10e3/uL   Glucose by meter   Result Value Ref Range    GLUCOSE BY METER POCT 231 (H) 70 - 99 mg/dL   Glucose by meter   Result Value Ref Range    GLUCOSE BY METER POCT 299 (H) 70 - 99 mg/dL   Glucose by meter   Result Value Ref Range    GLUCOSE BY METER POCT 129 (H) 70 - 99 mg/dL           Attestation:  I have reviewed today's vital signs, notes, medications, labs and imaging.  Amount of time spent managing this patient today:  50 minutes including discussion with ortho and detailed discussion with patient and wife answering extensive questions about glucose control and pain control as well as plan from ortho.

## 2025-07-19 NOTE — PROGRESS NOTES
Pain not well controlled. Patients reports pain is in left foot, hip pain is minimal.   Patient takes  mg of Tramadol 4 times a day at home.   Ambulated to bed with toe touch weight bearing on left foot. Using walker.

## 2025-07-19 NOTE — PROGRESS NOTES
Patients pain improved after receiving the 100 mg tramadol. He was able to rest for a few hours. Ice applied as needed. Dressing clean, dry and intact. Voiding with no difficulty.   Up with assist of one walker and gait belt. Weight bearing on left foot minimal.  Patient happy with blood sugar results.

## 2025-07-19 NOTE — PROGRESS NOTES
07/19/25 1200   Appointment Info   Signing Clinician's Name / Credentials (OT) Mila Gustafson, OTR/L   Living Environment   People in Home spouse;child(mihaela), adult;other (see comments)  (Small dog)   Current Living Arrangements house  (2 level)   Home Accessibility stairs within home;stairs to enter home   Number of Stairs, Main Entrance 3   Stair Railings, Main Entrance railings safe and in good condition   Number of Stairs, Within Home, Primary greater than 10 stairs   Stair Railings, Within Home, Primary railings safe and in good condition   Transportation Anticipated family or friend will provide   Living Environment Comments Bedroom where he spends most of his time is upstairs.   Self-Care   Usual Activity Tolerance moderate   Current Activity Tolerance fair   Regular Exercise Yes   Activity/Exercise Type walking;strength training   Exercise Amount/Frequency 2 times/wk   Equipment Currently Used at Home crutches;shower chair   Fall history within last six months yes   Number of times patient has fallen within last six months 1   Activity/Exercise/Self-Care Comment Pt able to perform ADLs at baseline   Instrumental Activities of Daily Living (IADL)   Previous Responsibilities meal prep;housekeeping;yardwork;medication management;driving;finances   IADL Comments Pt performs IADLs at baseline.   General Information   Onset of Illness/Injury or Date of Surgery 07/18/25   Referring Physician Dr. Mich Vasquez   Patient/Family Therapy Goal Statement (OT) Return home   Additional Occupational Profile Info/Pertinent History of Current Problem From H&P: Reilly Borja is a 75 year old male admitted on 7/18/2025. He has a past medical history significant for type 2 diabetes mellitus on insulin, HFpEF, hypertension, dyslipidemia, NAFLD, and Charcot's joint of left foot who presented to the emergency department fur evaluation of left hip pain after a fall, was found to have a femoral neck fracture.   Existing  Precautions/Restrictions fall;weight bearing   Cognitive Status Examination   Orientation Status orientation to person, place and time   Visual Perception   Visual Impairment/Limitations WFL   Sensory   Sensory Quick Adds sensation intact   Pain Assessment   Patient Currently in Pain Yes, see Vital Sign flowsheet  (Pain level 5/10)   Posture   Posture not impaired   Range of Motion Comprehensive   General Range of Motion lower extremity range of motion deficits identified  (LLE (ankle, foot) stiff/reduced ROM)   Strength Comprehensive (MMT)   General Manual Muscle Testing (MMT) Assessment no strength deficits identified   Muscle Tone Assessment   Muscle Tone Quick Adds No deficits were identified   Bed Mobility   Bed Mobility No deficits identified   Transfers   Transfers sit-stand transfer   Sit-Stand Transfer   Sit-Stand Allen (Transfers) contact guard   Assistive Device (Sit-Stand Transfers) walker, front-wheeled   Balance   Balance Assessment no deficits identified   Activities of Daily Living   BADL Assessment/Intervention no deficits identified   Clinical Impression   Criteria for Skilled Therapeutic Interventions Met (OT) Evaluation only   OT Diagnosis Presents near baseline   ADL comments/analysis Supervision   Intervention Comments Educated on IP OT role and POC, pt. pleasant and agree with POC.   Evaluation completed.   Clinical Decision Making Complexity (OT) problem focused assessment/low complexity   Risk & Benefits of therapy have been explained evaluation/treatment results reviewed;care plan/treatment goals reviewed;risks/benefits reviewed;current/potential barriers reviewed;participants voiced agreement with care plan;participants included;patient;spouse/significant other   Clinical Impression Comments Pt presents near functional baseline, adheres to WB precautions during transfers and related mobility, is willing to adhere to walker at home opposed to crutches that contributed to fall  incident.   OT Total Evaluation Time   OT Eval, Low Complexity Minutes (99631) 30   OT Discharge Planning   OT Plan Evaluation only   OT Discharge Recommendation (DC Rec) home   OT Rationale for DC Rec Pt near baseline, has spouse/family support, has a lot of stairs so would benefit from PT evaluation to assess stairs   OT Brief overview of current status Supervision with ADLs   OT Total Distance Amb During Session (feet) 25   OT Equipment Needed at Discharge walker, standard   Total Session Time   Total Session Time (sum of timed and untimed services) 30   Psychosocial Support   Trust Relationship/Rapport care explained;choices provided;questions answered;thoughts/feelings acknowledged

## 2025-07-19 NOTE — PROGRESS NOTES
Orthopaedics Progress Note      Post-operative Day: 1 Day Post-Op    Procedure(s):  ARTHROPLASTY, HIP, TOTAL      Subjective:    Pain: minimal  Chest pain, SOB:  No      Objective:  Blood pressure 112/52, pulse 85, temperature 98.1  F (36.7  C), temperature source Oral, resp. rate 18, weight 108 kg (238 lb), SpO2 96%.    Patient Vitals for the past 24 hrs:   BP Temp Temp src Pulse Resp SpO2   07/19/25 0512 112/52 98.1  F (36.7  C) Oral 85 18 96 %   07/19/25 0143 109/52 97.6  F (36.4  C) Oral 88 18 95 %   07/18/25 2033 -- -- -- -- -- 92 %   07/18/25 2025 -- -- -- -- -- (!) 89 %   07/18/25 2008 98/44 97.9  F (36.6  C) Oral 84 18 (!) 88 %   07/18/25 1615 (!) 87/63 -- -- 89 -- 93 %   07/18/25 1604 121/62 97.8  F (36.6  C) Oral 91 14 (!) 86 %   07/18/25 1545 136/71 -- -- 90 -- 95 %   07/18/25 1530 (!) 149/83 -- -- 90 11 97 %   07/18/25 1515 (!) 143/79 -- -- 87 13 92 %   07/18/25 1505 132/72 97.7  F (36.5  C) Temporal -- 15 94 %   07/18/25 1300 -- -- -- -- -- 94 %   07/18/25 1227 136/72 98.4  F (36.9  C) Oral -- 16 (!) 91 %   07/18/25 0908 118/63 97.9  F (36.6  C) Oral 88 18 92 %   07/18/25 0834 126/68 -- -- 86 13 94 %   07/18/25 0805 126/71 -- -- 84 15 94 %   07/18/25 0800 130/72 -- -- 82 13 --       Wt Readings from Last 4 Encounters:   07/18/25 108 kg (238 lb)   09/13/23 111.6 kg (246 lb 0.5 oz)   09/01/23 106.6 kg (235 lb)   07/21/23 104.8 kg (231 lb)         Motor function, sensation, and circulation intact   Yes  Wound status: incisions are clean dry and intact. Yes  Calf tenderness: Left  No    Pertinent Labs   Lab Results: personally reviewed.     Recent Labs   Lab Test 07/18/25  0356 07/21/23  1417 08/30/22  1055 12/02/21  0910 11/03/21  1545 10/27/21  1520 09/18/21  0924 09/18/21  0531   INR 1.01  --   --   --   --   --   --   --    WBC 8.6 6.9 7.1 4.4 5.1 6.6   < >  --    HGB 12.8* 12.6* 12.9* 12.9* 11.6* 12.1*   < > 9.6*   HCT 41.0 41.2 40.0 40.4 36.9* 38.7*   < >  --    MCV 91 88 91 87 85 85   < >  --      363 283 195 228 273   < >  --     138  --   --   --   --   --  135*   CRP  --   --   --  0.8* 6.4 16.0*   < >  --     < > = values in this interval not displayed.       Plan: Anticoagulation protocol:  mg daily and Aspirin 81 mg BID  x 30  days            Pain medications:  scopainmedication: oxycodone   ABX: Keflex 500mg po bid x 2 weeks            Weight bearing status:  WBAT            Disposition:  Home today pending PT             Continue cares and rehabilitation                         Report completed by:  Ambrosio Alba MD  Date: 7/19/2025  Time: 7:26 AM

## 2025-07-20 VITALS
SYSTOLIC BLOOD PRESSURE: 120 MMHG | BODY MASS INDEX: 30.56 KG/M2 | HEART RATE: 74 BPM | DIASTOLIC BLOOD PRESSURE: 56 MMHG | RESPIRATION RATE: 20 BRPM | TEMPERATURE: 97.9 F | OXYGEN SATURATION: 95 % | WEIGHT: 238 LBS

## 2025-07-20 LAB
ANION GAP SERPL CALCULATED.3IONS-SCNC: 10 MMOL/L (ref 7–15)
BUN SERPL-MCNC: 29.5 MG/DL (ref 8–23)
CALCIUM SERPL-MCNC: 8 MG/DL (ref 8.8–10.4)
CHLORIDE SERPL-SCNC: 100 MMOL/L (ref 98–107)
CREAT SERPL-MCNC: 0.87 MG/DL (ref 0.67–1.17)
EGFRCR SERPLBLD CKD-EPI 2021: >90 ML/MIN/1.73M2
ERYTHROCYTE [DISTWIDTH] IN BLOOD BY AUTOMATED COUNT: 15.7 % (ref 10–15)
GLUCOSE BLDC GLUCOMTR-MCNC: 108 MG/DL (ref 70–99)
GLUCOSE BLDC GLUCOMTR-MCNC: 166 MG/DL (ref 70–99)
GLUCOSE BLDC GLUCOMTR-MCNC: 60 MG/DL (ref 70–99)
GLUCOSE SERPL-MCNC: 122 MG/DL (ref 70–99)
HCO3 SERPL-SCNC: 25 MMOL/L (ref 22–29)
HCT VFR BLD AUTO: 30.2 % (ref 40–53)
HGB BLD-MCNC: 9.5 G/DL (ref 13.3–17.7)
MCH RBC QN AUTO: 28.3 PG (ref 26.5–33)
MCHC RBC AUTO-ENTMCNC: 31.5 G/DL (ref 31.5–36.5)
MCV RBC AUTO: 90 FL (ref 78–100)
PLATELET # BLD AUTO: 193 10E3/UL (ref 150–450)
POTASSIUM SERPL-SCNC: 3.9 MMOL/L (ref 3.4–5.3)
RBC # BLD AUTO: 3.36 10E6/UL (ref 4.4–5.9)
SODIUM SERPL-SCNC: 135 MMOL/L (ref 135–145)
WBC # BLD AUTO: 7.5 10E3/UL (ref 4–11)

## 2025-07-20 PROCEDURE — 250N000013 HC RX MED GY IP 250 OP 250 PS 637: Performed by: ORTHOPAEDIC SURGERY

## 2025-07-20 PROCEDURE — 36415 COLL VENOUS BLD VENIPUNCTURE: CPT | Performed by: FAMILY MEDICINE

## 2025-07-20 PROCEDURE — 97116 GAIT TRAINING THERAPY: CPT | Mod: GP

## 2025-07-20 PROCEDURE — 85014 HEMATOCRIT: CPT | Performed by: FAMILY MEDICINE

## 2025-07-20 PROCEDURE — 250N000013 HC RX MED GY IP 250 OP 250 PS 637: Performed by: FAMILY MEDICINE

## 2025-07-20 PROCEDURE — 80048 BASIC METABOLIC PNL TOTAL CA: CPT | Performed by: FAMILY MEDICINE

## 2025-07-20 PROCEDURE — 97163 PT EVAL HIGH COMPLEX 45 MIN: CPT | Mod: GP

## 2025-07-20 PROCEDURE — 99239 HOSP IP/OBS DSCHRG MGMT >30: CPT | Performed by: FAMILY MEDICINE

## 2025-07-20 RX ADMIN — TRAMADOL HYDROCHLORIDE 100 MG: 50 TABLET, COATED ORAL at 03:00

## 2025-07-20 RX ADMIN — GABAPENTIN 600 MG: 300 CAPSULE ORAL at 08:42

## 2025-07-20 RX ADMIN — ASPIRIN 325 MG: 325 TABLET ORAL at 08:42

## 2025-07-20 RX ADMIN — SENNOSIDES AND DOCUSATE SODIUM 1 TABLET: 50; 8.6 TABLET ORAL at 08:41

## 2025-07-20 RX ADMIN — SPIRONOLACTONE 25 MG: 25 TABLET ORAL at 08:43

## 2025-07-20 RX ADMIN — FAMOTIDINE 20 MG: 20 TABLET, FILM COATED ORAL at 08:42

## 2025-07-20 RX ADMIN — DULOXETINE HYDROCHLORIDE 30 MG: 30 CAPSULE, DELAYED RELEASE PELLETS ORAL at 08:42

## 2025-07-20 RX ADMIN — TORSEMIDE 40 MG: 20 TABLET ORAL at 08:42

## 2025-07-20 RX ADMIN — ACETAMINOPHEN 975 MG: 325 TABLET ORAL at 08:42

## 2025-07-20 RX ADMIN — METFORMIN HYDROCHLORIDE 1000 MG: 500 TABLET ORAL at 08:42

## 2025-07-20 RX ADMIN — CARVEDILOL 25 MG: 25 TABLET, FILM COATED ORAL at 08:43

## 2025-07-20 RX ADMIN — TRAMADOL HYDROCHLORIDE 100 MG: 50 TABLET, COATED ORAL at 08:41

## 2025-07-20 ASSESSMENT — ACTIVITIES OF DAILY LIVING (ADL)
ADLS_ACUITY_SCORE: 37
ADLS_ACUITY_SCORE: 37
ADLS_ACUITY_SCORE: 36
ADLS_ACUITY_SCORE: 36
ADLS_ACUITY_SCORE: 37
ADLS_ACUITY_SCORE: 37
ADLS_ACUITY_SCORE: 36
ADLS_ACUITY_SCORE: 37
ADLS_ACUITY_SCORE: 36

## 2025-07-20 NOTE — DISCHARGE INSTRUCTIONS
You may want to discuss your missed doses of Ozempic with your primary provider to minimize side effects when you restart.   Freida Vargas, Pharm.D.

## 2025-07-20 NOTE — PROGRESS NOTES
07/20/25 1000   Appointment Info   Signing Clinician's Name / Credentials (PT) Dee Alexander, PT, DPT, CLT   Quick Adds   Quick Adds [facility] Auth & Certification   Living Environment   People in Home spouse;child(mihaela), adult;other (see comments)   Current Living Arrangements house   Home Accessibility stairs within home;stairs to enter home   Number of Stairs, Main Entrance 3   Stair Railings, Main Entrance railings safe and in good condition   Number of Stairs, Within Home, Primary greater than 10 stairs   Stair Railings, Within Home, Primary railings safe and in good condition   Transportation Anticipated family or friend will provide   Living Environment Comments 3 CONY with B rails. He can sleep in recliner on first floor and there is a bathroom on main level as well until  PT can get in to help assess safety on flight of stairs with crutches.   Self-Care   Usual Activity Tolerance moderate   Current Activity Tolerance fair   Activity/Exercise Type biking   Equipment Currently Used at Home crutches;shower chair   Fall history within last six months yes   Number of times patient has fallen within last six months 1   General Information   Onset of Illness/Injury or Date of Surgery 07/18/25   Referring Physician Dr. Mich Vasquez   Pertinent History of Current Problem (include personal factors and/or comorbidities that impact the POC) Reilly Borja is a 75 year old male admitted on 7/18/2025. He has a past medical history significant for type 2 diabetes mellitus on insulin, HFpEF, hypertension, dyslipidemia, NAFLD, and Charcot's joint of left foot who presented to the emergency department fur evaluation of left hip pain after a fall, was found to have a femoral neck fracture. Pt had a L SHADI posterior approach   Existing Precautions/Restrictions no hip IR;no hip ER;no hip ADD past midline;90 degree hip flexion;no pivoting or twisting;fall;weight bearing   Weight-Bearing Status - LLE weight-bearing as  tolerated;partial weight-bearing (% in comments);other (see comments)  (2 days s/p L SHADI is WBAT, but L foot is 2.5 months s/p sx and Heel-touch WB thru L LLE with Boot donned at all times)   Cognition   Affect/Mental Status (Cognition) WFL   Pain Assessment   Patient Currently in Pain Yes, see Vital Sign flowsheet  (Pt reports mild to mod pain this AM in L hip)   Integumentary/Edema   Integumentary/Edema other (describe)   Integumentary/Edema Comments pt L LE has definite edema thruout foot/calf/knee/thigh   Range of Motion (ROM)   Range of Motion ROM deficits secondary to surgical procedure   Strength (Manual Muscle Testing)   Strength (Manual Muscle Testing) Deficits observed during functional mobility   Bed Mobility   Bed Mobility no deficits identified;supine-sit   Transfers   Transfers sit-stand transfer;no deficits identified   Sit-Stand Transfer   Assistive Device (Sit-Stand Transfers) walker, standard   Gait/Stairs (Locomotion)   [place] Level (Gait) contact guard   Assistive Device (Gait) walker, standard   Distance in Feet (Gait) 150   Pattern (Gait) step-to   Deviations/Abnormal Patterns (Gait) gait speed decreased;antalgic   Maintains Weight-bearing Status (Gait) able to maintain   Negotiation (Stairs) maintains weight-bearing status   Maintains Weight-bearing Status (Stairs) able to maintain   Clinical Impression   Criteria for Skilled Therapeutic Intervention Yes, treatment indicated   PT Diagnosis (PT) impaired functional mobility   Influenced by the following impairments LE weakness, reduced activity tolerance, L hip pain and L heel pain   Functional limitations due to impairments impaired transfers and gait on level surfaces and stairs   Clinical Presentation (PT Evaluation Complexity) evolving   Clinical Presentation Rationale clinical reasoning   Clinical Decision Making (Complexity) high complexity   Planned Therapy Interventions (PT) home exercise program;stair  training;strengthening;transfer training;home program guidelines;risk factor education;progressive activity/exercise;gait training   Risk & Benefits of therapy have been explained patient;evaluation/treatment results reviewed;care plan/treatment goals reviewed;risks/benefits reviewed;current/potential barriers reviewed;participants voiced agreement with care plan;participants included   PT Total Evaluation Time   PT [place] High Complexity Minutes (22285) 30   Therapy Certification   Start of care date 07/20/25   Certification date from 07/20/25   Certification date to 07/21/25   Medical Diagnosis Closed fracture of neck of left femur, initial encounter (H)   [facility] Authorization Information   Condition type Initial onset (within last 3 months)   Cause of current episode Traumatic   Nature of treatment Rehabilitative   Functional ability Moderate functional limitations   Documented POC (choose all that apply) Measurable short and long term/discharge treatment goals related to physical and functional deficits.;Frequency of treatment visits and treatment activities to address deficit areas.;Patient agrees to program participation including home program   Briefly describe symptoms I'm feeling pretty weak today and it's hard to lift my L leg to get around. My butt hurts   How did the symptoms start I fell and broke my hip while walking to the bathroom in the middle of the night   Last 24H: avg pain/symptom intensity  5/10   Past wk: avg pain/symptom intensity 5/10   Frequency of Symptoms Constantly (% of the time)   Symptom impact on ADLs Quite a bit   Condition change since eval N/A (first visit)   General health reported by patient Fair   Gait Training   Gait Training Minutes (88897) 55   Symptoms Noted During/After Treatment (Gait Training) fatigue;increased pain   Treatment Detail/Skilled Intervention PT instructed pt in the 3 CONY with B rails he has at home, since he can sleep in recliner on first floor and there  is a bathroom on main level as well until HH PT can get in to help assess safety on flight of stairs with crutches. PT issued pt a FWW for home use (instead of crutches) and he will have the crutches for when HH comes out to practice on the flight of stairs to access 2nd floor for bathing.   Distance in Feet 150   [place] Level (Gait Training) contact guard   Physical Assistance Level (Gait Training) 1 person assist   Weight Bearing (Gait Training) other (see comments)   Assistive Device (Gait Training) standard walker   Pattern Analysis (Gait Training) swing-to gait   Gait Analysis Deviations decreased con;decreased step length;decreased weight-shifting ability   Impairments (Gait Analysis/Training) pain;strength decreased   Stair Railings present at both sides   Physical Assist/Nonphysical Assist (Stairs) 1 person assist;verbal cues   Level of [place] (Stairs) contact guard   Assistive Device (Stairs)   (3 CONY with B rails. He can sleep in recliner on first floor and there is a bathroom on main level as well until HH PT can get in to help assess safety on flight of stairs with crutches.)   PT Discharge Planning   PT Plan D/c home with HH PT and OT   PT Discharge Recommendation (DC Rec) home with home care physical therapy   PT Rationale for DC Rec rec HH PT and OT for continued strengthening and home safety Eval for fall prevention and to promote safe access of second floor of home. Spouse is home to provided assist as needed.   PT Brief overview of current status Pt is CGA-SBA with all functional mobility but reports feeling very weak today and requires lots of seated rest and able to amb 150' slowly at one time and complete 10 steps with 2 rails. Pt has B railings only to enter home, not to access his second floor, so he plans to sleep in his recliner and use the main floor BR and will benefit from HH PT to build strength back.   PT Total Distance Amb During Session (feet) 300   PT Equipment Needed  at Discharge walker, standard  (PT issued pt a new FWW today, he signed paperwork and Dr. Walter verbally approved order)   Physical Therapy Time and Intention   Timed Code Treatment Minutes 55   Total Session Time (sum of timed and untimed services) 85

## 2025-07-20 NOTE — PLAN OF CARE
Physical Therapy Discharge Summary    Reason for therapy discharge:    Discharged to home with home therapy.    Progress towards therapy goal(s). See goals on Care Plan in [facility] electronic health record for goal details.  Goals partially met.  Barriers to achieving goals:   discharge from facility.    Therapy recommendation(s):    Continued therapy is recommended.  Rationale/Recommendations:  Pt would benefit from skilled  PT to progress safety and independence with functional mobility.         (4) walks frequently

## 2025-07-20 NOTE — PROGRESS NOTES
Care Management Discharge Note    Discharge Date: 07/19/2025       Discharge Disposition: Home Care    Discharge Services: None    Discharge DME: Walker    Discharge Transportation: family or friend will provide    Private pay costs discussed: Not applicable    Does the patient's insurance plan have a 3 day qualifying hospital stay waiver?  No    PAS Confirmation Code: N/A  Patient/family educated on Medicare website which has current facility and service quality ratings: yes    Education Provided on the Discharge Plan: Yes  Persons Notified of Discharge Plans: Patient  Patient/Family in Agreement with the Plan: yes    Handoff Referral Completed: No, handoff not indicated or clinically appropriate    Additional Information:    Spoke with Patient.  Discussed home care.  Referral placed for [facility] Intake/Referral (Phone: [phone]) RN, PT and OT.      Discharge Plan:  Home with home care (referral pending).  [facility] will secure home care agency.       Kimberlyn Joseph RN

## 2025-07-20 NOTE — DISCHARGE SUMMARY
[facility] Discharge Summary    Reilly Borja MRN# [id number]   Age: 75 year old YOB: 1950     Date of Admission:  7/18/2025  Date of Discharge::  7/20/2025  Admitting Physician:  Doug Walter MD  Discharge Physician:  Doug Walter MD             Admission Diagnoses:   Closed fracture of neck of left femur, initial encounter (H) [S72.002A]  Status post total replacement of left hip [Z96.642]          Principle Discharge Diagnosis:         Closed fracture of neck of left femur, initial encounter - s/p SHADI 7/18    See hospital course for further active diagnoses addressed during this admission.                 Medications Prior to Admission:     Medications Prior to Admission   Medication Sig Dispense Refill Last Dose/Taking    acetaminophen (TYLENOL) 500 MG tablet Take 500-1,000 mg by mouth every 6 hours as needed for mild pain.   Past Week    carvedilol (COREG) 25 MG tablet Take 25 mg by mouth daily with food.   7/17/2025 Morning    DULoxetine (CYMBALTA) 30 MG capsule Take 30 mg by mouth daily.   7/17/2025    empagliflozin (JARDIANCE) 25 MG TABS tablet Take 25 mg by mouth every morning   7/16/2025    gabapentin (NEURONTIN) 300 MG capsule Take 600 mg by mouth 2 times daily. 2 capsules (600 mg) morning and afternoon. This is in addition to the bedtime dose of 900 mg.   7/17/2025 Evening    gabapentin (NEURONTIN) 300 MG capsule Take 900 mg by mouth at bedtime. Take 3 tablets (900 mg) at bedtime. This is in addition to the morning and afternoon doses.   7/17/2025 Bedtime    insulin aspart (NOVOLOG PEN) 100 UNIT/ML pen Inject 10 Units subcutaneously 3 times daily (with meals).   7/17/2025 Evening    insulin glargine (LANTUS VIAL) 100 UNIT/ML vial Inject 25 Units subcutaneously every evening. Inject 25 units in the evening. This is in addition to the 30 units in the morning.   7/17/2025 Evening    insulin glargine (LANTUS VIAL) 100 UNIT/ML vial Inject 30 Units subcutaneously every morning.  Inject 30 units in the morning. This is in addition to the 25 units in the evening.   7/17/2025 Evening    metFORMIN (GLUCOPHAGE) 1000 MG tablet Take 1,000 mg by mouth 2 times daily (with meals)   7/17/2025 Evening    sacubitril-valsartan (ENTRESTO)  MG per tablet Take 1 tablet by mouth daily.   7/17/2025 Evening    semaglutide (OZEMPIC, 1 MG/DOSE,) 2 MG/1.5ML pen Inject 1 mg Subcutaneous every 7 days Sunday 6/29/2025    spironolactone (ALDACTONE) 25 MG tablet Take 25 mg by mouth daily.   7/17/2025 Morning    torsemide (DEMADEX) 20 MG tablet Take 40 mg by mouth daily.   7/17/2025 Morning    traMADol (ULTRAM) 50 MG tablet Take 50 mg by mouth 4 times daily as needed for moderate to severe pain.   7/17/2025 Evening             Discharge Medications:     Current Discharge Medication List        START taking these medications    Details   !! acetaminophen (TYLENOL) 325 MG tablet Take 2 tablets (650 mg) by mouth every 4 hours as needed for other (mild pain).    Associated Diagnoses: Closed fracture of neck of left femur, initial encounter (H)      aspirin (ASA) 325 MG EC tablet Take 1 tablet (325 mg) by mouth daily.  Qty: 30 tablet, Refills: 0    Associated Diagnoses: Closed fracture of neck of left femur, initial encounter (H)      cephALEXin (KEFLEX) 500 MG capsule Take 1 capsule (500 mg) by mouth 2 times daily.  Qty: 30 capsule, Refills: 0    Associated Diagnoses: Status post total replacement of left hip      hydrOXYzine HCl (ATARAX) 25 MG tablet Take 1 tablet (25 mg) by mouth every 6 hours as needed for itching or anxiety (with pain, moderate pain).  Qty: 30 tablet, Refills: 0    Associated Diagnoses: Closed fracture of neck of left femur, initial encounter (H)      oxyCODONE (ROXICODONE) 5 MG tablet Take 1-2 tablets (5-10 mg) by mouth every 4 hours as needed for moderate to severe pain.  Qty: 26 tablet, Refills: 0    Associated Diagnoses: Closed fracture of neck of left femur, initial encounter (H)       senna-docusate (SENOKOT-S/PERICOLACE) 8.6-50 MG tablet Take 1-2 tablets by mouth 2 times daily as needed for constipation. Take while on oral narcotics to prevent or treat constipation.  Qty: 30 tablet, Refills: 0    Comments: While taking narcotics  Associated Diagnoses: Closed fracture of neck of left femur, initial encounter (H)       !! - Potential duplicate medications found. Please discuss with provider.        CONTINUE these medications which have NOT CHANGED    Details   !! acetaminophen (TYLENOL) 500 MG tablet Take 500-1,000 mg by mouth every 6 hours as needed for mild pain.      carvedilol (COREG) 25 MG tablet Take 25 mg by mouth daily with food.      DULoxetine (CYMBALTA) 30 MG capsule Take 30 mg by mouth daily.      empagliflozin (JARDIANCE) 25 MG TABS tablet Take 25 mg by mouth every morning      !! gabapentin (NEURONTIN) 300 MG capsule Take 600 mg by mouth 2 times daily. 2 capsules (600 mg) morning and afternoon. This is in addition to the bedtime dose of 900 mg.      !! gabapentin (NEURONTIN) 300 MG capsule Take 900 mg by mouth at bedtime. Take 3 tablets (900 mg) at bedtime. This is in addition to the morning and afternoon doses.      insulin aspart (NOVOLOG PEN) 100 UNIT/ML pen Inject 10 Units subcutaneously 3 times daily (with meals).      !! insulin glargine (LANTUS VIAL) 100 UNIT/ML vial Inject 25 Units subcutaneously every evening. Inject 25 units in the evening. This is in addition to the 30 units in the morning.      !! insulin glargine (LANTUS VIAL) 100 UNIT/ML vial Inject 30 Units subcutaneously every morning. Inject 30 units in the morning. This is in addition to the 25 units in the evening.      metFORMIN (GLUCOPHAGE) 1000 MG tablet Take 1,000 mg by mouth 2 times daily (with meals)      sacubitril-valsartan (ENTRESTO)  MG per tablet Take 1 tablet by mouth daily.      semaglutide (OZEMPIC, 1 MG/DOSE,) 2 MG/1.5ML pen Inject 1 mg Subcutaneous every 7 days Sunday      spironolactone  "(ALDACTONE) 25 MG tablet Take 25 mg by mouth daily.      torsemide (DEMADEX) 20 MG tablet Take 40 mg by mouth daily.      traMADol (ULTRAM) 50 MG tablet Take 50 mg by mouth 4 times daily as needed for moderate to severe pain.       !! - Potential duplicate medications found. Please discuss with provider.                Consultations:   Orthopedic surgery           Brief History of Illness from time of admission:     From Admission H+P:   Reilly Borja is a 75 year old male with a past medical history significant for type 2 diabetes mellitus on insulin, HFpEF, hypertension, dyslipidemia, NAFLD, and Charcot's joint of left foot who presented to the emergency department fur evaluation of left hip pain after a fall, was found to have a femoral neck fracture.      Patient has crutches to ambulate at baseline due to left Charcot foot.  He had been asleep but woke in the middle of the night to use the bathroom, and as he was ambulating with his crutches he lost his balance and fell backwards, landing on his posterior left hip.  He felt immediate pain in his left hip and leg as well as a sensation of numbness, \"like my leg was dead.\"  He was brought to the emergency department where workup revealed an acute left femoral neck fracture.            TODAY:     Subjective:  Doing well today, no new concerns.  More bright and energetic.  Pain well-controlled. Feels ready for discharge home.  No new or other pain.     ROS:   ROS: 10 point ROS neg other than the symptoms noted above in the HPI.   /56 (BP Location: Right arm)   Pulse 74   Temp 97.9  F (36.6  C) (Oral)   Resp 20   Wt 108 kg (238 lb)   SpO2 95%   BMI 30.56 kg/m     EXAM:  General: awake and alert, NAD, oriented x 3  Head: normocephalic  Neck: unremarkable, no lymphadenopathy   HEENT: oropharynx pink and moist    Heart: Regular rate and rhythm, no murmurs, rubs, or gallops  Lungs: clear to auscultation bilaterally with good air movement " "throughout  Abdomen: soft, non-tender, no masses or organomegaly  Extremities: trace edema in lower extremities - baseline per patient   Skin unremarkable as visualized.       Hospital Course:        Reilly Borja is a 75 year old male admitted on 7/18/2025. He has a past medical history significant for type 2 diabetes mellitus on insulin, HFpEF, hypertension, dyslipidemia, NAFLD, and Charcot's joint of left foot who presented to the emergency department fur evaluation of left hip pain after a fall, was found to have a femoral neck fracture.      Closed fracture of neck of left femur, initial encounter - s/p SHADI 7/18  Tripped and fell while using crutches, landed on left side with new femoral neck fracture.  X-ray pelvis: \"Acute impacted mildly displaced and angulated left femoral neck fracture. Mild degenerative changes both hips. Fusion hardware partially visualized in the lower lumbar spine. Vascular calcification. Otherwise unremarkable.\"  Case discussed between emergency department and on-call ortho.   - Ortho consulted, anticipate OR later this afternoon   - Hip fracture order set utilized  - NPO prior to surgery, will need consistent carbohydrate diet post-op  - Analgesia: scheduled acetaminophen, prn oxycodone, prn dilaudid, prn Robaxin  - s/p SHADI 7/19,  - patient requested to continue home tramadol with main pain being due to long-standing charcot foot more than hip fracture/repair.  Resumed home tramadol 7/19.    - doing well, good pain control, did well with physical therapy.  Ok for discharge per ortho- anticoagulation and pain control per orthopedics.     - follow-up with ortho in 2 weeks per their recommendations         Hypoxemia   Was stable on room air initially in the emergency department, but later became mildly hypoxic to 89%, new 2L O2 requirement. Patient denies any respiratory symptoms. Does have history of heart failure, lungs with mild crackles but otherwise appears compensated.   Chest " "x-ray - \"Stable cardiac enlargement and pulmonary venous congestion. Shallow inspiration. The lungs are otherwise clear. No pneumothorax.\"   Suspect patient may have hypoxemia secondary to narcotics, as it occurred after receiving pain medications. May also have an element of mild heart failure contributing, not severe   - Continue supplemental O2 to maintain sats > 91%, wean as able  - Holding diuretics pre-operatively, reassess post-op to see if more aggressive diuresis is needed   - resolved with no further issues         Type 2 diabetes mellitus with complication, with long-term current use of insulin  HgbA1c 7.8. Managed prior to admission with Lantus 30U am / 25U pm, metformin 1000 mg bid, and Jardiance 25 mg daily.  - Lantus 15U x1 dose pre-op, can resume home regimen 30U am / 25 U qpm post-op and tolerating oral intake  - Glucose checks q4h while NPO, transition to qid once eating post-op  - Medium sliding scale insulin   - Will need consistent carbohydrate diet once advanced  - Hypoglycemia treatment available  - glucose high AM 7/19, resumed home regimen and added medium dose sliding scale insulin with significant improvement   - continue home regimen on discharge.          (HFpEF) heart failure with preserved ejection fraction   Essential hypertension  Previously diagnosed, last echo on record in 2023 with EF 50-55%, no wall motion abnormalities. However, patient is on GDMT for HFrEF, so unclear if [facility] that shows EF reduction. Managed prior to admission with carvedilol 25 mg bid (or possibly once daily?), Jardiance 25 mg daily, Entresto  mg bid, spironolactone 25 mg daily, and torsemide 40 mg bid.   Slight crackles on admission exam, otherwise appears compensated and stable pre-operatively.  - Continue home carvedilol at bid dosing with holding parameters  - Held Jardiance, Entresto, spironolactone, and torsemide pre-operatively- resumed torsemide and " spironolactonr 7/20, resume other medications on discharge.          Pure hypercholesterolemia  Noted in problem list, not on pharmacotherapy prior to admission.   - Continue with PCP management        Charcot's joint of left foot  Chronic left foot pain  Previously diagnosed, has had multiple surgeries, most [facility]). Had been using crutches to ambulate since his last surgery. Pain managed prior to admission with prn acetaminophen, and prn oxycodone.   - as above switched to home tramadol 7/19   - Needed PT/OT after surgery as above         Nonalcoholic fatty liver disease  Noted in problem list. Alk phos elevated (229), other LFTs WNL.   - Stable        DVT Prophylaxis: Pneumatic Compression Devices  Freeman Catheter: Not present  Lines: None     Cardiac Monitoring: None  Code Status: Full Code - discussed at time of admission      Clinically Significant Risk Factors Present on Admission          # Hypochloremia: Lowest Cl = 96 mmol/L in last 2 days, will monitor as appropriate  # Hypocalcemia: Lowest Ca = 8.5 mg/dL in last 2 days, will monitor and replace as appropriate         # Hypertension: Noted on problem list            # DMII: A1C = 7.8 % (Ref range: <5.7 %) within past 6 months        # Financial/Environmental Concerns:                     Discharge Instructions and Follow-Up:      Discharge diet: Orders Placed This Encounter      Discharge Instruction - Regular Diet Adult      High Consistent Carb (75 g CHO per Meal) Diet       Discharge activity: Weight bearing as tolerated    Discharge follow-up: Follow-up with orthopedic team in 2 weeks            Discharge Disposition:     Discharged to home       Attestation:  Amount of time performed on this discharge summary: 45 minutes.    Doug Walter MD

## 2025-07-20 NOTE — PLAN OF CARE
Goal Outcome Evaluation:      Plan of Care Reviewed With: patient    Overall Patient Progress: improvingOverall Patient Progress: improving     Alert and oriented. Up with assist and a walker. Using urinal overnight. Awakened and called at 0300 for tramadol. Left foot still edematous. Complaints of pain in left foot. Left hip dressing dry and intact. Allowed blood sugar to be checked at 0305 and it was 166. Plan for discharge today.

## 2025-07-20 NOTE — PROGRESS NOTES
Orthopaedics Progress Note      Post-operative Day: 2 Days Post-Op    Procedure(s):  ARTHROPLASTY, HIP, TOTAL      Subjective:    Pain: minimal  Chest pain, SOB:  No      Objective:  Blood pressure 132/55, pulse 85, temperature 98.5  F (36.9  C), temperature source Oral, resp. rate 21, weight 108 kg (238 lb), SpO2 (!) 90%.    Patient Vitals for the past 24 hrs:   BP Temp Temp src Pulse Resp SpO2   07/19/25 2200 132/55 98.5  F (36.9  C) Oral 85 21 (!) 90 %   07/19/25 2023 130/50 98.1  F (36.7  C) Oral 88 21 95 %   07/19/25 1552 111/53 97.9  F (36.6  C) Oral 83 22 (!) 91 %   07/19/25 1100 94/46 97.8  F (36.6  C) Oral 82 22 93 %       Wt Readings from Last 4 Encounters:   07/18/25 108 kg (238 lb)   09/13/23 111.6 kg (246 lb 0.5 oz)   09/01/23 106.6 kg (235 lb)   07/21/23 104.8 kg (231 lb)         Motor function, sensation, and circulation intact   Yes  Wound status: incisions are clean dry and intact. Yes  Calf tenderness: Left  No    Pertinent Labs   Lab Results: personally reviewed.     Recent Labs   Lab Test 07/20/25  0646 07/19/25  0643 07/18/25  0356 07/21/23  1417 08/30/22  1055 12/02/21  0910 11/03/21  1545 10/27/21  1520   INR  --   --  1.01  --   --   --   --   --    WBC 7.5 12.5* 8.6 6.9   < > 4.4 5.1 6.6   HGB 9.5* 11.4* 12.8* 12.6*   < > 12.9* 11.6* 12.1*   HCT 30.2* 36.1* 41.0 41.2   < > 40.4 36.9* 38.7*   MCV 90 89 91 88   < > 87 85 85    283 291 363   < > 195 228 273   NA  --  134* 135 138  --   --   --   --    CRP  --   --   --   --   --  0.8* 6.4 16.0*    < > = values in this interval not displayed.       Plan: Anticoagulation protocol:  mg daily and Aspirin 81 mg BID  x 30  days            Pain medications:  scopainmedication: oxycodone and tylenol            Weight bearing status:  WBAT            Disposition:  Home.  Follow up in 2 weeks.             Continue cares and rehabilitation     Report completed by:  Ambrosio Alba MD  Date: 7/20/2025  Time: 7:15 AM

## 2025-07-20 NOTE — PLAN OF CARE
"Blood sugar at 2156 was 204 which required 1 unit per sliding scale. Patient became agitated muttering under his breath and stating, \"This place, you guys are so ridiculous. One unit is not going to bring my blood sugar down.\" Explained sliding scale and recheck at 0200. Eventually refused insulin and stated he would call when he woke up in the night, but did not want to be awakened. Sign placed on door and staff alerted to not enter room. Rated pain a 0/10 but requested Tramadol 100mg.                "

## 2025-07-20 NOTE — PLAN OF CARE
[facility] DISCHARGE NOTE    Patient discharged to home at 1:49 PM via wheel chair. Accompanied by significant other and staff. Discharge instructions reviewed with other, opportunity offered to ask questions. Prescriptions sent to patients preferred pharmacy. All belongings sent with patient.    Lis Greer RN      Goal Outcome Evaluation:Met           Up with stand by assist. Pain controlled with as needed Tramadol  mg every 6 hours and tylenol. Ice pack sent with patient and he is wearing his boot.

## 2025-07-21 ENCOUNTER — PATIENT OUTREACH (OUTPATIENT)
Dept: CARE COORDINATION | Facility: CLINIC | Age: 75
End: 2025-07-21
Payer: COMMERCIAL

## 2025-07-21 NOTE — PROGRESS NOTES
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  How are your symptoms? (Red Flag symptoms escalate to triage hotline per guidelines): Improved  Do you know how to contact your clinic care team if you have future questions or changes to your health status? : Yes  Does the patient have their discharge instructions? : Yes  Does the patient have questions regarding their discharge instructions? : No  Were you started on any new medications or were there changes to any of your previous medications? : Yes  Does the patient have all of their medications?: Yes  Do you have questions regarding any of your medications? : No  Do you have all of your needed medical supplies or equipment (DME)?  (i.e. oxygen tank, CPAP, cane, etc.): Yes    Post-op ([facility] CTA Only)  If the patient had a surgery or procedure, do they have any questions for a nurse?: No         Follow up Plan     Discharge Follow-Up  Discharge follow up appointment scheduled in alignment with recommended follow up timeframe or Transitions of Risk Category? (Low = within 30 days; Moderate= within 14 days; High= within 7 days): No  Patient's follow up appointment not  scheduled: Patient declined scheduling support. Education on the importance of transitions of care follow up. Provided scheduling phone number.    No future appointments.    Outpatient Plan as outlined on AVS reviewed with patient.      For any urgent concerns, please contact our 24 hour nurse triage line: [phone]         CAYETANO Us  [phone]  [facility]

## 2025-07-22 ENCOUNTER — HOSPITAL ENCOUNTER (OUTPATIENT)
Facility: CLINIC | Age: 75
Setting detail: OBSERVATION
Discharge: NURSING, REHAB OR LONG-TERM CARE | End: 2025-07-23
Admitting: INTERNAL MEDICINE
Payer: COMMERCIAL

## 2025-07-22 ENCOUNTER — APPOINTMENT (OUTPATIENT)
Dept: CT IMAGING | Facility: CLINIC | Age: 75
End: 2025-07-22
Payer: COMMERCIAL

## 2025-07-22 ENCOUNTER — APPOINTMENT (OUTPATIENT)
Dept: ULTRASOUND IMAGING | Facility: CLINIC | Age: 75
End: 2025-07-22
Payer: COMMERCIAL

## 2025-07-22 ENCOUNTER — APPOINTMENT (OUTPATIENT)
Dept: GENERAL RADIOLOGY | Facility: CLINIC | Age: 75
End: 2025-07-22
Payer: COMMERCIAL

## 2025-07-22 DIAGNOSIS — Z96.649 STATUS POST TOTAL REPLACEMENT OF HIP, UNSPECIFIED LATERALITY: ICD-10-CM

## 2025-07-22 DIAGNOSIS — R29.898 LEFT LEG WEAKNESS: Primary | ICD-10-CM

## 2025-07-22 DIAGNOSIS — S72.002A CLOSED FRACTURE OF NECK OF LEFT FEMUR, INITIAL ENCOUNTER (H): ICD-10-CM

## 2025-07-22 PROBLEM — D50.9 IRON DEFICIENCY ANEMIA, UNSPECIFIED: Status: ACTIVE | Noted: 2021-09-16

## 2025-07-22 LAB
ANION GAP SERPL CALCULATED.3IONS-SCNC: 14 MMOL/L (ref 7–15)
BUN SERPL-MCNC: 27.1 MG/DL (ref 8–23)
CALCIUM SERPL-MCNC: 8.5 MG/DL (ref 8.8–10.4)
CHLORIDE SERPL-SCNC: 96 MMOL/L (ref 98–107)
CREAT SERPL-MCNC: 0.92 MG/DL (ref 0.67–1.17)
EGFRCR SERPLBLD CKD-EPI 2021: 87 ML/MIN/1.73M2
ERYTHROCYTE [DISTWIDTH] IN BLOOD BY AUTOMATED COUNT: 15.7 % (ref 10–15)
GLUCOSE BLDC GLUCOMTR-MCNC: 187 MG/DL (ref 70–99)
GLUCOSE BLDC GLUCOMTR-MCNC: 190 MG/DL (ref 70–99)
GLUCOSE SERPL-MCNC: 302 MG/DL (ref 70–99)
HCO3 SERPL-SCNC: 26 MMOL/L (ref 22–29)
HCT VFR BLD AUTO: 34.6 % (ref 40–53)
HGB BLD-MCNC: 10.8 G/DL (ref 13.3–17.7)
MCH RBC QN AUTO: 28.1 PG (ref 26.5–33)
MCHC RBC AUTO-ENTMCNC: 31.2 G/DL (ref 31.5–36.5)
MCV RBC AUTO: 90 FL (ref 78–100)
PLATELET # BLD AUTO: 247 10E3/UL (ref 150–450)
POTASSIUM SERPL-SCNC: 4.2 MMOL/L (ref 3.4–5.3)
RBC # BLD AUTO: 3.84 10E6/UL (ref 4.4–5.9)
SODIUM SERPL-SCNC: 136 MMOL/L (ref 135–145)
WBC # BLD AUTO: 5.9 10E3/UL (ref 4–11)

## 2025-07-22 PROCEDURE — 99285 EMERGENCY DEPT VISIT HI MDM: CPT

## 2025-07-22 PROCEDURE — 93971 EXTREMITY STUDY: CPT | Mod: LT

## 2025-07-22 PROCEDURE — 99285 EMERGENCY DEPT VISIT HI MDM: CPT | Mod: 25

## 2025-07-22 PROCEDURE — 250N000013 HC RX MED GY IP 250 OP 250 PS 637

## 2025-07-22 PROCEDURE — G0378 HOSPITAL OBSERVATION PER HR: HCPCS

## 2025-07-22 PROCEDURE — 71045 X-RAY EXAM CHEST 1 VIEW: CPT

## 2025-07-22 PROCEDURE — 80048 BASIC METABOLIC PNL TOTAL CA: CPT

## 2025-07-22 PROCEDURE — 82962 GLUCOSE BLOOD TEST: CPT

## 2025-07-22 PROCEDURE — 250N000012 HC RX MED GY IP 250 OP 636 PS 637

## 2025-07-22 PROCEDURE — 85041 AUTOMATED RBC COUNT: CPT

## 2025-07-22 PROCEDURE — 36415 COLL VENOUS BLD VENIPUNCTURE: CPT

## 2025-07-22 PROCEDURE — 72131 CT LUMBAR SPINE W/O DYE: CPT

## 2025-07-22 PROCEDURE — 99222 1ST HOSP IP/OBS MODERATE 55: CPT

## 2025-07-22 RX ORDER — NALOXONE HYDROCHLORIDE 0.4 MG/ML
0.4 INJECTION, SOLUTION INTRAMUSCULAR; INTRAVENOUS; SUBCUTANEOUS
Status: DISCONTINUED | OUTPATIENT
Start: 2025-07-22 | End: 2025-07-23 | Stop reason: HOSPADM

## 2025-07-22 RX ORDER — NICOTINE POLACRILEX 4 MG
15-30 LOZENGE BUCCAL
Status: DISCONTINUED | OUTPATIENT
Start: 2025-07-22 | End: 2025-07-23 | Stop reason: HOSPADM

## 2025-07-22 RX ORDER — DEXTROSE MONOHYDRATE 25 G/50ML
25-50 INJECTION, SOLUTION INTRAVENOUS
Status: DISCONTINUED | OUTPATIENT
Start: 2025-07-22 | End: 2025-07-23 | Stop reason: HOSPADM

## 2025-07-22 RX ORDER — ONDANSETRON 2 MG/ML
4 INJECTION INTRAMUSCULAR; INTRAVENOUS EVERY 6 HOURS PRN
Status: DISCONTINUED | OUTPATIENT
Start: 2025-07-22 | End: 2025-07-23 | Stop reason: HOSPADM

## 2025-07-22 RX ORDER — GABAPENTIN 300 MG/1
600 CAPSULE ORAL 2 TIMES DAILY
Status: DISCONTINUED | OUTPATIENT
Start: 2025-07-23 | End: 2025-07-23 | Stop reason: HOSPADM

## 2025-07-22 RX ORDER — ACETAMINOPHEN 325 MG/1
650 TABLET ORAL EVERY 4 HOURS PRN
Status: DISCONTINUED | OUTPATIENT
Start: 2025-07-22 | End: 2025-07-23 | Stop reason: HOSPADM

## 2025-07-22 RX ORDER — TRAMADOL HYDROCHLORIDE 50 MG/1
50 TABLET ORAL 4 TIMES DAILY PRN
Status: DISCONTINUED | OUTPATIENT
Start: 2025-07-22 | End: 2025-07-23 | Stop reason: HOSPADM

## 2025-07-22 RX ORDER — TORSEMIDE 20 MG/1
40 TABLET ORAL DAILY
Status: DISCONTINUED | OUTPATIENT
Start: 2025-07-23 | End: 2025-07-23 | Stop reason: HOSPADM

## 2025-07-22 RX ORDER — SPIRONOLACTONE 25 MG/1
25 TABLET ORAL DAILY
Status: DISCONTINUED | OUTPATIENT
Start: 2025-07-23 | End: 2025-07-23 | Stop reason: HOSPADM

## 2025-07-22 RX ORDER — CARVEDILOL 25 MG/1
25 TABLET ORAL DAILY
Status: DISCONTINUED | OUTPATIENT
Start: 2025-07-23 | End: 2025-07-23 | Stop reason: HOSPADM

## 2025-07-22 RX ORDER — DULOXETIN HYDROCHLORIDE 30 MG/1
30 CAPSULE, DELAYED RELEASE ORAL DAILY
Status: DISCONTINUED | OUTPATIENT
Start: 2025-07-23 | End: 2025-07-23 | Stop reason: HOSPADM

## 2025-07-22 RX ORDER — SENNOSIDES 8.6 MG
325 CAPSULE ORAL DAILY
Status: DISCONTINUED | OUTPATIENT
Start: 2025-07-23 | End: 2025-07-23 | Stop reason: HOSPADM

## 2025-07-22 RX ORDER — NALOXONE HYDROCHLORIDE 0.4 MG/ML
0.2 INJECTION, SOLUTION INTRAMUSCULAR; INTRAVENOUS; SUBCUTANEOUS
Status: DISCONTINUED | OUTPATIENT
Start: 2025-07-22 | End: 2025-07-23 | Stop reason: HOSPADM

## 2025-07-22 RX ORDER — GABAPENTIN 300 MG/1
600 CAPSULE ORAL 2 TIMES DAILY
Status: DISCONTINUED | OUTPATIENT
Start: 2025-07-22 | End: 2025-07-22

## 2025-07-22 RX ORDER — ONDANSETRON 4 MG/1
4 TABLET, ORALLY DISINTEGRATING ORAL EVERY 6 HOURS PRN
Status: DISCONTINUED | OUTPATIENT
Start: 2025-07-22 | End: 2025-07-23 | Stop reason: HOSPADM

## 2025-07-22 RX ORDER — HYDROXYZINE HYDROCHLORIDE 25 MG/1
25 TABLET, FILM COATED ORAL EVERY 6 HOURS PRN
Status: DISCONTINUED | OUTPATIENT
Start: 2025-07-22 | End: 2025-07-23 | Stop reason: HOSPADM

## 2025-07-22 RX ORDER — GABAPENTIN 300 MG/1
900 CAPSULE ORAL AT BEDTIME
Status: DISCONTINUED | OUTPATIENT
Start: 2025-07-22 | End: 2025-07-23 | Stop reason: HOSPADM

## 2025-07-22 RX ORDER — SACUBITRIL AND VALSARTAN 97; 103 MG/1; MG/1
1 TABLET, FILM COATED ORAL DAILY
Status: DISCONTINUED | OUTPATIENT
Start: 2025-07-23 | End: 2025-07-23 | Stop reason: HOSPADM

## 2025-07-22 RX ORDER — AMOXICILLIN 250 MG
1 CAPSULE ORAL 2 TIMES DAILY PRN
Status: DISCONTINUED | OUTPATIENT
Start: 2025-07-22 | End: 2025-07-23 | Stop reason: HOSPADM

## 2025-07-22 RX ORDER — PROCHLORPERAZINE MALEATE 5 MG/1
5 TABLET ORAL EVERY 6 HOURS PRN
Status: DISCONTINUED | OUTPATIENT
Start: 2025-07-22 | End: 2025-07-23 | Stop reason: HOSPADM

## 2025-07-22 RX ORDER — AMOXICILLIN 250 MG
2 CAPSULE ORAL 2 TIMES DAILY PRN
Status: DISCONTINUED | OUTPATIENT
Start: 2025-07-22 | End: 2025-07-23 | Stop reason: HOSPADM

## 2025-07-22 RX ADMIN — INSULIN ASPART 10 UNITS: 100 INJECTION, SOLUTION INTRAVENOUS; SUBCUTANEOUS at 19:57

## 2025-07-22 RX ADMIN — ACETAMINOPHEN 650 MG: 325 TABLET ORAL at 19:04

## 2025-07-22 RX ADMIN — INSULIN GLARGINE 25 UNITS: 100 INJECTION, SOLUTION SUBCUTANEOUS at 20:03

## 2025-07-22 RX ADMIN — GABAPENTIN 900 MG: 300 CAPSULE ORAL at 21:11

## 2025-07-22 RX ADMIN — TRAMADOL HYDROCHLORIDE 50 MG: 50 TABLET, COATED ORAL at 21:11

## 2025-07-22 ASSESSMENT — ACTIVITIES OF DAILY LIVING (ADL)
ADLS_ACUITY_SCORE: 46
FALL_HISTORY_WITHIN_LAST_SIX_MONTHS: YES
WALKING_OR_CLIMBING_STAIRS: AMBULATION DIFFICULTY, DEPENDENT
WALKING_OR_CLIMBING_STAIRS_DIFFICULTY: YES
WEAR_GLASSES_OR_BLIND: YES
DOING_ERRANDS_INDEPENDENTLY_DIFFICULTY: YES
NUMBER_OF_TIMES_PATIENT_HAS_FALLEN_WITHIN_LAST_SIX_MONTHS: 1
DRESSING/BATHING: BATHING DIFFICULTY, ASSISTANCE 1 PERSON
ADLS_ACUITY_SCORE: 54
VISION_MANAGEMENT: GLASSES
DIFFICULTY_EATING/SWALLOWING: NO
HEARING_DIFFICULTY_OR_DEAF: NO
ADLS_ACUITY_SCORE: 54
ADLS_ACUITY_SCORE: 46
ADLS_ACUITY_SCORE: 54
ADLS_ACUITY_SCORE: 46
ADLS_ACUITY_SCORE: 54
ADLS_ACUITY_SCORE: 46
DRESSING/BATHING_DIFFICULTY: YES
ADLS_ACUITY_SCORE: 46
CHANGE_IN_FUNCTIONAL_STATUS_SINCE_ONSET_OF_CURRENT_ILLNESS/INJURY: YES
CONCENTRATING,_REMEMBERING_OR_MAKING_DECISIONS_DIFFICULTY: NO
DIFFICULTY_COMMUNICATING: NO
TOILETING_ISSUES: NO
EQUIPMENT_CURRENTLY_USED_AT_HOME: SHOWER CHAIR;WALKER, ROLLING
ADLS_ACUITY_SCORE: 54
WEAR_GLASSES_OR_BLIND: YES

## 2025-07-22 ASSESSMENT — COLUMBIA-SUICIDE SEVERITY RATING SCALE - C-SSRS
6. HAVE YOU EVER DONE ANYTHING, STARTED TO DO ANYTHING, OR PREPARED TO DO ANYTHING TO END YOUR LIFE?: NO
2. HAVE YOU ACTUALLY HAD ANY THOUGHTS OF KILLING YOURSELF IN THE PAST MONTH?: NO
1. IN THE PAST MONTH, HAVE YOU WISHED YOU WERE DEAD OR WISHED YOU COULD GO TO SLEEP AND NOT WAKE UP?: NO

## 2025-07-22 NOTE — MEDICATION SCRIBE - ADMISSION MEDICATION HISTORY
Medication Scribe Admission Medication History    Admission medication history is complete. The information provided in this note is only as accurate as the sources available at the time of the update.    Information Source(s): Patient, Family member, and CareEverywhere/SureScripts via in person    Pertinent Information: PTA med list reviewed with patient in room with spouse and finished at desk.  Has not taken any Oxycodone or stool softener laxative tablets yet.    Changes made to PTA medication list:  Added: None  Deleted: None  Changed: Metformin from taking bid to taking in AM.    Allergies reviewed with patient and updates made in EHR: yes, no allergies.    Medication History Completed By: Natali Sarmiento 7/22/2025 6:30 PM    PTA Med List   Medication Sig Note Last Dose/Taking    acetaminophen (TYLENOL) 500 MG tablet Take 500-1,000 mg by mouth every 6 hours as needed for mild pain.  7/21/2025    aspirin (ASA) 325 MG EC tablet Take 1 tablet (325 mg) by mouth daily.  7/22/2025 Morning    carvedilol (COREG) 25 MG tablet Take 25 mg by mouth daily with food.  7/22/2025 Morning    cephALEXin (KEFLEX) 500 MG capsule Take 1 capsule (500 mg) by mouth 2 times daily.  7/22/2025 Morning    DULoxetine (CYMBALTA) 30 MG capsule Take 30 mg by mouth daily.  7/22/2025 Morning    empagliflozin (JARDIANCE) 25 MG TABS tablet Take 25 mg by mouth every morning  7/22/2025 Morning    gabapentin (NEURONTIN) 300 MG capsule Take 600 mg by mouth 2 times daily. 2 capsules (600 mg) morning and afternoon. This is in addition to the bedtime dose of 900 mg.  7/22/2025 at  2:00 PM    gabapentin (NEURONTIN) 300 MG capsule Take 900 mg by mouth at bedtime. This is in addition to the morning and afternoon doses.  7/21/2025 Bedtime    hydrOXYzine HCl (ATARAX) 25 MG tablet Take 1 tablet (25 mg) by mouth every 6 hours as needed for itching or anxiety (with pain, moderate pain).  7/22/2025 Morning    insulin aspart (NOVOLOG PEN) 100 UNIT/ML pen Inject 10  Units subcutaneously 3 times daily (with meals).  7/22/2025 Noon    insulin glargine (LANTUS VIAL) 100 UNIT/ML vial Inject 25 Units subcutaneously every evening.  7/21/2025 Evening    insulin glargine (LANTUS VIAL) 100 UNIT/ML vial Inject 30 Units subcutaneously every morning.  7/22/2025 Morning    metFORMIN (GLUCOPHAGE) 1000 MG tablet Take 1,000 mg by mouth daily (with breakfast).  7/22/2025 Morning    oxyCODONE (ROXICODONE) 5 MG tablet Take 1-2 tablets (5-10 mg) by mouth every 4 hours as needed for moderate to severe pain. 7/22/2025: Hasn't taken any yet. Taking As Needed    sacubitril-valsartan (ENTRESTO)  MG per tablet Take 1 tablet by mouth daily.  7/22/2025 Morning    semaglutide (OZEMPIC, 1 MG/DOSE,) 2 MG/1.5ML pen Inject 1 mg subcutaneously every 7 days. On Sunday 7/6/2025    senna-docusate (SENOKOT-S/PERICOLACE) 8.6-50 MG tablet Take 1-2 tablets by mouth 2 times daily as needed for constipation. Take while on oral narcotics to prevent or treat constipation. 7/22/2025: Hasn't needed any yet Taking As Needed    spironolactone (ALDACTONE) 25 MG tablet Take 25 mg by mouth daily.  7/22/2025 Morning    torsemide (DEMADEX) 20 MG tablet Take 40 mg by mouth daily.  7/22/2025 Morning    traMADol (ULTRAM) 50 MG tablet Take 50 mg by mouth 4 times daily as needed for moderate to severe pain.  7/22/2025 Noon

## 2025-07-22 NOTE — ED NOTES
[facility]   Admission Handoff    The patient is Reilly Borja, 75 year old who arrived in the ED by AMBULANCE from home with a complaint of Placement and Swelling in foot  . The patient's current symptoms are a recurrence of a past episode and during this time the symptoms have increased. In the ED, patient was diagnosed with   Final diagnoses:   Left leg weakness         Needed?: No    Allergies:  No Known Allergies    Past Medical Hx:   Past Medical History:   Diagnosis Date    Congestive heart failure (H)     Diabetes (H)     Hypertension     Iron deficiency anemia secondary to inadequate dietary iron intake        Initial vitals were: BP: 123/59  Pulse: 74  Temp: 98.4  F (36.9  C)  Resp: 18  SpO2: 93 %   Recent vital Signs: /56   Pulse 68   Temp 98.4  F (36.9  C)   Resp 18   SpO2 94%     Elimination Status: Continent: Yes     Activity Level: 2 assist    Fall Status: Reason for falls risk:  Mobility  bed/chair alarm on, nonskid shoes/slippers when out of bed, arm band in place, patient and family education, assistive device/personal items within reach, and activity supervised    Baseline Mental status: WDL  Current Mental Status changes: at basesline    Infection present or suspected this encounter: no  Sepsis suspected: No    Isolation type: na    Bariatric equipment needed?: No    In the ED these meds were given: Medications - No data to display    Drips running?  No    Home pump  No    Current LDAs:     Results:   Labs/Imaging  Ordered and Resulted from Time of ED Arrival Up to the Time of Departure from the ED  Recent Results (from the past 24 hours)   XR Chest 1 View    Narrative    EXAM: XR CHEST 1 VIEW  LOCATION: [facility]  DATE: 7/22/2025    INDICATION: 75M, hx CHF, persistent mild hypoxia  COMPARISON: Chest radiograph 7/18/2025      Impression    IMPRESSION: Stable size of cardiomediastinal silhouette. Interval decrease in  pulmonary vascular congestion and interstitial edema. No milena airspace consolidation, pleural effusion or pneumothorax. Bones are unchanged. Old/healed right rib fractures are   again noted.   CT Lumbar Spine w/o Contrast    Narrative    EXAM: CT LUMBAR SPINE W/O CONTRAST  LOCATION: [facility]  DATE: 7/22/2025    INDICATION: 75M, recent fall with left fem neck fx, here today due to inability to flex LLE off of bed 2 2 weakness  COMPARISON: None.  TECHNIQUE: Routine CT Lumbar Spine without IV contrast. Multiplanar reformats. Dose reduction techniques were used.     FINDINGS:  No evidence of acute fracture or posttraumatic subluxation involving the lumbar spine. Fusion hardware at L5-S1 with solid osseous fusion. Multilevel mild degenerative disc disease and facet arthropathy. Disc bulging contributes to mild to moderate   spinal canal stenoses at L3-4 and L4-5. Mild foraminal stenoses at multiple levels. Scattered vascular calcifications. Distention of the bladder. Left hip prosthesis with streak artifact. Presumed atelectasis at the lung bases. Probable small sliding   hiatal hernia.      Impression    IMPRESSION:  1.  No evidence of acute fracture or posttraumatic subluxation.   CBC with platelets   Result Value Ref Range    WBC Count 5.9 4.0 - 11.0 10e3/uL    RBC Count 3.84 (L) 4.40 - 5.90 10e6/uL    Hemoglobin 10.8 (L) 13.3 - 17.7 g/dL    Hematocrit 34.6 (L) 40.0 - 53.0 %    MCV 90 78 - 100 fL    MCH 28.1 26.5 - 33.0 pg    MCHC 31.2 (L) 31.5 - 36.5 g/dL    RDW 15.7 (H) 10.0 - 15.0 %    Platelet Count 247 150 - 450 10e3/uL   Basic metabolic panel   Result Value Ref Range    Sodium 136 135 - 145 mmol/L    Potassium 4.2 3.4 - 5.3 mmol/L    Chloride 96 (L) 98 - 107 mmol/L    Carbon Dioxide (CO2) 26 22 - 29 mmol/L    Anion Gap 14 7 - 15 mmol/L    Urea Nitrogen 27.1 (H) 8.0 - 23.0 mg/dL    Creatinine 0.92 0.67 - 1.17 mg/dL    GFR Estimate 87 >60 mL/min/1.73m2    Calcium 8.5 (L) 8.8 - 10.4  mg/dL    Glucose 302 (H) 70 - 99 mg/dL   US Lower Extremity Venous Duplex Left    Narrative    EXAM: US LOWER EXTREMITY VENOUS DUPLEX LEFT  LOCATION: [facility]  DATE: 7/22/2025    INDICATION: 75M, recent hip arthroplasty, here with unilateral left leg edema  COMPARISON: None.  TECHNIQUE: Venous Duplex ultrasound of the left lower extremity with and without compression, augmentation and duplex. Color flow and spectral Doppler with waveform analysis performed.    FINDINGS: Exam includes the common femoral, femoral, popliteal, and contralateral common femoral veins as well as segmentally visualized deep calf veins and greater saphenous vein.     LEFT: No deep vein thrombosis. No superficial thrombophlebitis. No popliteal cyst.      Impression    IMPRESSION:  1.  No deep venous thrombosis in the left lower extremity.       For the majority of the shift this patient's behavior was Green     Cardiac Rhythm: N/A  Pt needs tele? No  Skin/wound Issues: Left total hip repair on Friday. Bruising noted. Dressing CDI.     Code Status: Full Code    Pain control: good    Nausea control: pt had none    Abnormal labs/tests/findings requiring intervention: No interventions.     Patient tested for COVID 19 prior to admission: NO     OBS brochure/video discussed/provided to patient/family: Yes     Family present during ED course? Yes     Family Comments/Social Situation comments: none    Tasks needing completion: None    Aide Harvey RN

## 2025-07-22 NOTE — ED PROVIDER NOTES
[facility]  Emergency Department Visit Note    PATIENT:  Reilly Borja     75 year old     male      [id number]    Chief complaint:  Chief Complaint   Patient presents with    Placement    Swelling in foot          History of present illness:  Patient is a 75 year old male with type 2 diabetes, HTN, HFpEF, HLD, elevated BMI presenting for evaluation of left leg weakness.    I reviewed discharge summary 7/20/2025, admitted at that time after left femoral neck fracture status post SHADI 7/18/2025.  Looks like he got to a point where he was on his home tramadol for chronic foot pain, pain was well-controlled, did well with physical therapy.  I reviewed OT recommendations 7/19/2025, discharge recommendation at that time was home.  I am unable to see PT recommendations.    Patient presents today with girlfriend due to weakness.  Patient does agree he was able to get to a point he was able to walk down the hallway.  Girlfriend reports that since he has been home he has been sitting in the recliner unable to move due to weakness of the left leg.  Has been voiding in a urinal next to the recliner.  Has not been ambulating.  Patient is reporting some mild tenderness over his hip area but his primary area of pain and concern is in the left heel which is chronic.  No fevers.  No chest pain or dyspnea.  Significant other has also noted worsening circumferential swelling of the left lower extremity that is new since the surgery.      Review of Systems:  As in HPI above    /54   Pulse 76   Temp 98.4  F (36.9  C)   Resp 18   SpO2 (!) 88%       Physical Exam:  Constitutional: laying in hospital bed, alert, oriented, and in no apparent distress  HEENT: normocephalic, atraumatic  Neck: no stridor  Cardiovascular: regular rate and rhythm  Pulmonary: Breathing comfortably on 02 L nasal cannula  Abdominal: Nondistended  Extremities/MSK: Evolving ecchymosis over the left lateral thigh and left posterior  thigh, small area of ecchymosis over the left medial thigh.  Dressing is with scant.  Bloody uptake along the center of the dressing/incision site but there is no other changes to the dressing.  No erythema.  The area is diffusely mildly tender to palpation without fluctuance. The left leg is visibly edematous compared to the right leg circumferentially from the heel to the thigh/groin.  Left DP pulse present.  Bilateral distal lower extremities are warm and appear equally well-perfused.  Minimal pain with passive range of motion of the left hip, none of the left knee.  Skin: warm, dry  Neurologic: moves all four extremities spontaneously and he is able to make attempts at flexing the left leg off of the bed in the sense that he is firing hip flexors and knee extensors, but he is unable to lift his heel off of the bed nor is he able to lift the left lower extremity off of the bed.  He is able to make attempts at dorsiflexion/plantarflexion of the left foot though limited in a chronic sense from prior ankle fusion.  He has intact sensation bilateral lower extremities.  Psychiatric: calm, appropriate      MDM:  Patient is a 75 year old male with above history presenting for evaluation of left lower extremity weakness.    Vitals are notable for borderline hypoxia.  On room air satting upper 80s to low 90s, he is not in any respiratory distress. Exam is notable for postoperative changes, edema, and weakness of the left lower extremity.    Unclear exactly what is causing his weakness of the left lower extremity.  He is firing muscle bodies but simply too weak to lift the leg off the bed, does not seem to be limited by pain as he really only reports minimal pain of the left hip area.  Seems to be a fairly significant decline from discharge during which she was apparently able to ambulate.  Not having any back pain currently or admission at time of admission but possible given the trauma that there was some sort of  radicular related weakness and so planning for CT lumbar. Presentation would be very atypical for stroke, outside of any sort of thrombolytic window regardless given symptoms for greater than 48 hours.  The swelling raises concern of VTE but would be fairly soon after surgery for this.  No obvious infectious signs or symptoms.    Regarding the borderline hypoxia, this was present during the recent admission.  Felt at this time potentially hypoxemia related to narcotics as occurring mostly after receiving pain medications but also possible element of heart failure.  Is at risk for PE though the hypoxia preceded the surgery and does not seem to be any worse, and he has no respiratory symptoms.  Will screen with x-ray.    Disposition likely admission. Remainder of ED course below.    ED COURSE:  ED Course as of 07/22/25 1705   Tue Jul 22, 2025   1629 XR Chest 1 View  Independently reviewed as well as radiologist report reviewed, improvement in findings of pulmonary vascular congestion potentially consistent with mild volume overload.  No acute finding on the x-ray otherwise   1636 CT Lumbar Spine w/o Contrast  No acute finding in the lumbar spine   1705 Spoke with hospitalist.  Plan for observation.  Bed requested.       Encounter Diagnoses:  Final diagnoses:   Left leg weakness       Final disposition: [facility] observation    Willie Lynn MD  7/22/2025  2:49 PM   Emergency Medicine  [facility]      Willie Lynn MD  07/22/25 1705

## 2025-07-22 NOTE — PLAN OF CARE
"[facility] ADMISSION NOTE    Patient admitted to room 2400 at approximately 1845 via bed from emergency room. Patient was accompanied by significant other.     Verbal SBAR report received from ISAK Naylor prior to patient arrival.     Patient trasferred to bed via air lizbeth. Patient alert and oriented X 3. Pain is not well controlled.  Medication(s) being used: none.  . Admission vital signs: Blood pressure 128/63, pulse 70, temperature 97.7  F (36.5  C), temperature source Oral, resp. rate 18, height 1.88 m (6' 2\"), weight 110.6 kg (243 lb 13.3 oz), SpO2 93%. Patient was oriented to plan of care, call light, bed controls, tv, telephone, bathroom, and visiting hours.     Risk Assessment    The following safety risks were identified during admission: fall. Yellow risk band applied: YES.     Skin Initial Assessment    This writer admitted this patient and completed a full skin assessment and Kris score in the Adult PCS flowsheet.   Photo documentation of skin problem and/or wound competed via [employer] application (located under Media):  N/A    Appropriate interventions initiated as needed.     Secondary skin check completed by ISAK Khoury.         Education    Patient has a [place] to Observation order: Yes  Observation education completed and documented: Yes      Citlaly Diaz RN    Goal Outcome Evaluation:                            "

## 2025-07-22 NOTE — H&P
[facility]    History and Physical  Hospital Medicine       Date of Admission:  7/22/2025  Date of Service: 7/22/2025     Assessment & Plan   Reilly Borja is a 75 year old male with past medical history of hypertension, charcot joint of left foot, nonalcoholic fatty liver disease, T2DM with insulin use, HFpEF, recent hip replacement now presents on 7/22/2025 with managing poorly at home following hip replacement. He is being admitted for PT eval & disposition planning.     Left leg weakness    Presents with profound left leg weakness in setting of left SHADI after a fall 7/18. Was non-weightbearing on leg for 2 months prior to SHADI 2/2 charcot foot (was using crutches). Seen by OT post-op day 1, who recommended PT eval. No PT note completed, but per discharge summary patient was seen & did ok with PT. Was discharged home, & has not been able to get out of bed since discharge. Unable to lift leg up at all. No numbness.     CT lumbar spine w/out contrast negative.  Venous duplex US of left leg negative for DVT.     Most likely left leg weakness due to chronic deconditioning from offloading 2/2 Charcot foot (below) exacerbated by recent hip arthroplasty.   - PT, OT, care management consults   - Fall precautions, up with assist   - Ortho consult, pain management, & other routine post-op cares as per SHADI, below    Fall, causing left femoral neck fracture  Now S/p Left SHADI 7/18/25    Mechanical fall 7/18 causing hip fracture, now s/p arthroplasty. Discharged home with aspirin, tramadol for pain control. Feels weakness in thigh since surgery; can't lift his leg up.   - Orthopedic surgery consult, appreciate recommendations & assistance  - Continue PTA aspirin 325mg daily for VTE prophylaxis per ortho  - Continue PTA tramadol for pain control    Charcot's joint of left foot    [facility] multiple [facility], [facility] Had been using crutches to  ambulate since his last surgery.   - PT, OT, pain management as per hip arthroplasty, above    (HFpEF) heart failure with preserved ejection fraction    ECHO 2023 LVEF 50-55%, no wall motion abnormalities. On GDMT for HFrEF so unclear if she had subsequent reduced EF that we cannot see?     CXR 7/22 shows some increased pulmonary vascular congestion. Clinically appears grossly euvolemic. Doubt HF exacerbation.   - Continue PTA carvedilol 25mg daily   - Continue PTA spironolactone 25mg daily    - Continue PTA torsemide 40mg daily     Essential hypertension    Normotensive on admission.   - Continue PTA Entresto as per diabetes, below    Type 2 diabetes mellitus     Hemoglobin A1c 7.8% on 7/18/2025.     Glucose 302 on presentation.     - Continue PTA insulin aspart 10u TID with meals  - Continue PTA insulin glargine 30u in AM, 25u in PM  - Continue PTA empagliflozin 25mg daily   - Continue PTA metformin 100mg daily with breakfast  - Continue PTA sacubitril-valsartan 97-103mg daily   - Medium sliding scale correction insulin   - Hypoglycemia protocol in place   - Continue PTA gabapentin 600mg BID & 900mg at bedtime    Iron deficiency anemia    Baseline hemoglobin  11.5 - 12.5. Hgb on presentation 10.8.   - Hemoglobin in AM     Hyperlipidemia    Noted in history; not on any outpatient pharmacologic management.     Anxiety  - Continue PTA duloxetine 30mg daily     Nonalcoholic fatty liver disease    Noted in history. Recommend ongoing PCP follow up for surveillance & lifestyle interventions.     Clinically Significant Risk Factors Present on Admission          # Hypochloremia: Lowest Cl = 96 mmol/L in last 2 days, will monitor as appropriate        # Drug Induced Platelet Defect: home medication list includes an antiplatelet medication   # Hypertension: Noted on problem list      # Anemia: based on hgb <11      # DMII: A1C = 7.8 % (Ref range: <5.7 %) within past 6 months        # Financial/Environmental Concerns:             Diet:  moderate carb  DVT Prophylaxis: Ambulate every shift  Freeman Catheter: Not present  Code Status:  full code  Lines: PIV    Disposition Plan   Medically Ready for Discharge: Anticipated Tomorrow     The patient's care was discussed with the Attending Physician, Dr. Jude Keenan, bedside RN, and the patient.    Loretta Ordonez PA-C        Primary Care Physician   [facility] None    History is obtained from the patient, who is a decent historian, handoff from ER provider, and review of old records via the EMR.    History of Present Illness   Reilly Borja is a 75 year old male with past medical history of hypertension, charcot joint of left foot, nonalcoholic fatty liver disease, T2DM with insulin use, HFpEF, recent hip replacement now presents on 7/22/2025 with managing poorly at home following hip replacement.     Patient reports using crutches and non-weightbearing on his left leg for at least the past 2 months. Recently fell while ambulating with crutches, broke his left hip. Now s/p repair. Feels like since the arthroplasty he's not been able to lift or move his leg. He was discharged home from the hospital but has been unable to get himself up out of a chair since discharge because he feels like he can't move his leg. It's profoundly weak, originating from the hip. Has pain radiating down his entire leg which he feels is worse in his anterior thigh. He has some neuropathic twinges in his left posterior ankle. No numbness or tingling. Denies fevers or chills. Denies dyspnea; no chest pain or pleurisy. No falls.   Was unable to move his leg at home so could not ambulate & felt he needed more help. He has not been doing therapy exercises.     Upon arrival to ER patient received lab & imaging workup.     Review of Systems    ROS: 10 point ROS neg other than the symptoms noted above in the HPI.    Past Medical History    Past Medical History:   Diagnosis Date    Congestive  heart failure (H)     Diabetes (H)     Hypertension     Iron deficiency anemia secondary to inadequate dietary iron intake      Past Surgical History   Past Surgical History:   Procedure Laterality Date    ARTHRODESIS FOOT Left 9/13/2023    Procedure: Left Talonavicular and Calcanecuboid Joint Fusion;  Surgeon: Orlando Duarte MD;  Location: [facility] OR    ARTHROPLASTY HIP Left 7/18/2025    Procedure: ARTHROPLASTY, LEFT HIP, TOTAL;  Surgeon: Mich Vasquez MD;  Location: [facility] OR    ARTHROSCOPY KNEE Left     IRRIGATION AND DEBRIDEMENT LOWER EXTREMITY, COMBINED Left 9/17/2021    Procedure: IRRIGATION AND DEBRIDEMENT  WITH BONE BIOPSY LEFT FOOT;  Surgeon: Julius Campbell DO;  Location: [facility] OR    OTHER SURGICAL HISTORY Left     surgery for charcot of left foot    REMOVE HARDWARE LOWER EXTREMITY Left 9/17/2021    Procedure: REMOVAL OF HARDWARE LEFT FOOT DEEP;  Surgeon: Julius Campbell DO;  Location: [facility] LUMBAR SPINE FUSN,POST INTRBDY Right 02/06/2017    Procedure: RIGHT L5-S1 TRANSFORAMINAL LUMBAR INTERBODY FUSION;  Surgeon: Rajiv Zavala MD;  [facility];  Service: Spine      Prior to Admission Medications   Prior to Admission Medications   Prescriptions Last Dose Informant Patient Reported? Taking?   DULoxetine (CYMBALTA) 30 MG capsule   Yes No   Sig: Take 30 mg by mouth daily.   acetaminophen (TYLENOL) 500 MG tablet   Yes No   Sig: Take 500-1,000 mg by mouth every 6 hours as needed for mild pain.   aspirin (ASA) 325 MG EC tablet   No No   Sig: Take 1 tablet (325 mg) by mouth daily.   carvedilol (COREG) 25 MG tablet   Yes No   Sig: Take 25 mg by mouth daily with food.   cephALEXin (KEFLEX) 500 MG capsule   No No   Sig: Take 1 capsule (500 mg) by mouth 2 times daily.   empagliflozin (JARDIANCE) 25 MG TABS tablet   Yes No   Sig: Take 25 mg by mouth every morning   gabapentin (NEURONTIN) 300 MG capsule   Yes No   Sig: Take 600 mg by mouth 2  times daily. 2 capsules (600 mg) morning and afternoon. This is in addition to the bedtime dose of 900 mg.   gabapentin (NEURONTIN) 300 MG capsule   Yes No   Sig: Take 900 mg by mouth at bedtime. Take 3 tablets (900 mg) at bedtime. This is in addition to the morning and afternoon doses.   hydrOXYzine HCl (ATARAX) 25 MG tablet   No No   Sig: Take 1 tablet (25 mg) by mouth every 6 hours as needed for itching or anxiety (with pain, moderate pain).   insulin aspart (NOVOLOG PEN) 100 UNIT/ML pen   Yes No   Sig: Inject 10 Units subcutaneously 3 times daily (with meals).   insulin glargine (LANTUS VIAL) 100 UNIT/ML vial   Yes No   Sig: Inject 30 Units subcutaneously every morning. Inject 30 units in the morning. This is in addition to the 25 units in the evening.   insulin glargine (LANTUS VIAL) 100 UNIT/ML vial   Yes No   Sig: Inject 25 Units subcutaneously every evening. Inject 25 units in the evening. This is in addition to the 30 units in the morning.   metFORMIN (GLUCOPHAGE) 1000 MG tablet  Self Yes No   Sig: Take 1,000 mg by mouth 2 times daily (with meals)   oxyCODONE (ROXICODONE) 5 MG tablet   No No   Sig: Take 1-2 tablets (5-10 mg) by mouth every 4 hours as needed for moderate to severe pain.   sacubitril-valsartan (ENTRESTO)  MG per tablet   Yes No   Sig: Take 1 tablet by mouth daily.   semaglutide (OZEMPIC, 1 MG/DOSE,) 2 MG/1.5ML pen   Yes No   Sig: Inject 1 mg Subcutaneous every 7 days Sunday   senna-docusate (SENOKOT-S/PERICOLACE) 8.6-50 MG tablet   No No   Sig: Take 1-2 tablets by mouth 2 times daily as needed for constipation. Take while on oral narcotics to prevent or treat constipation.   spironolactone (ALDACTONE) 25 MG tablet   Yes No   Sig: Take 25 mg by mouth daily.   torsemide (DEMADEX) 20 MG tablet   Yes No   Sig: Take 40 mg by mouth daily.   traMADol (ULTRAM) 50 MG tablet   Yes No   Sig: Take 50 mg by mouth 4 times daily as needed for moderate to severe pain.      Facility-Administered  Medications: None     Allergies   No Known Allergies    Family History    Family History   Problem Relation Age of Onset    Venous thrombosis Mother     Diabetes Mother     Anesthesia Reaction No family hx of      Social History   Social History     Socioeconomic History    Marital status: Single     Physical Exam   /56   Pulse 68   Temp 98.4  F (36.9  C)   Resp 18   SpO2 94%      Weight: 243 lbs 13.26 oz Body mass index is 31.31 kg/m .     Constitutional: Alert, oriented, cooperative, no apparent distress, appears nontoxic  Eyes: Eyes are clear  HENT: Oropharynx is clear and moist. No evidence of cranial trauma.  Cardiovascular: Regular rate and rhythm, normal S1 and S2, and no murmur noted. Radial pulse 2+. 1+ pitting LE edema of left leg up to mid-calf. No erythema or tenderness to palpation. No edema of right leg.   Respiratory: Clear to auscultation bilaterally. Unlabored on room air.   GI: Soft, non-tender, normal bowel sounds  Genitourinary: Deferred  Musculoskeletal: Normal muscle bulk, no obvious joint deformities.   Skin: Warm and dry, no rashes. Bruising of posterior left thigh & buttock.   Neurologic: Neck supple.  is symmetric. Alert & interacting appropriately. No tremor.     Data   Data reviewed today:     I have personally reviewed the following data over the past 24 hrs:    5.9  \   10.8 (L)   / 247     136 96 (L) 27.1 (H) /  302 (H)   4.2 26 0.92 \       Recent Results (from the past 24 hours)   XR Chest 1 View    Narrative    EXAM: XR CHEST 1 VIEW  LOCATION: [facility]  DATE: 7/22/2025    INDICATION: 75M, hx CHF, persistent mild hypoxia  COMPARISON: Chest radiograph 7/18/2025      Impression    IMPRESSION: Stable size of cardiomediastinal silhouette. Interval decrease in pulmonary vascular congestion and interstitial edema. No milena airspace consolidation, pleural effusion or pneumothorax. Bones are unchanged. Old/healed right rib fractures are   again  noted.   CT Lumbar Spine w/o Contrast    Narrative    EXAM: CT LUMBAR SPINE W/O CONTRAST  LOCATION: [facility]  DATE: 7/22/2025    INDICATION: 75M, recent fall with left fem neck fx, here today due to inability to flex LLE off of bed 2 2 weakness  COMPARISON: None.  TECHNIQUE: Routine CT Lumbar Spine without IV contrast. Multiplanar reformats. Dose reduction techniques were used.     FINDINGS:  No evidence of acute fracture or posttraumatic subluxation involving the lumbar spine. Fusion hardware at L5-S1 with solid osseous fusion. Multilevel mild degenerative disc disease and facet arthropathy. Disc bulging contributes to mild to moderate   spinal canal stenoses at L3-4 and L4-5. Mild foraminal stenoses at multiple levels. Scattered vascular calcifications. Distention of the bladder. Left hip prosthesis with streak artifact. Presumed atelectasis at the lung bases. Probable small sliding   hiatal hernia.      Impression    IMPRESSION:  1.  No evidence of acute fracture or posttraumatic subluxation.   US Lower Extremity Venous Duplex Left    Narrative    EXAM: US LOWER EXTREMITY VENOUS DUPLEX LEFT  LOCATION: [facility]  DATE: 7/22/2025    INDICATION: 75M, recent hip arthroplasty, here with unilateral left leg edema  COMPARISON: None.  TECHNIQUE: Venous Duplex ultrasound of the left lower extremity with and without compression, augmentation and duplex. Color flow and spectral Doppler with waveform analysis performed.    FINDINGS: Exam includes the common femoral, femoral, popliteal, and contralateral common femoral veins as well as segmentally visualized deep calf veins and greater saphenous vein.     LEFT: No deep vein thrombosis. No superficial thrombophlebitis. No popliteal cyst.      Impression    IMPRESSION:  1.  No deep venous thrombosis in the left lower extremity.

## 2025-07-22 NOTE — ED TRIAGE NOTES
Pt arrived via EMS for placement. Pt had a left total hip repair on Friday, was recommended to go to the TCU. Pt declined at the time. Pt also has swelling in left foot, had surgery back in March on foot. Since hip surgery foot pt has had an increase in foot swelling. Pt is not able to care for himself at home, either is his wife. Pt lives on second story. EMS had to call PD for lift assist.      Triage Assessment (Adult)       Row Name 07/22/25 1406          Triage Assessment    Airway WDL WDL        Respiratory WDL    Respiratory WDL WDL        Skin Circulation/Temperature WDL    Skin Circulation/Temperature WDL X  redness to left foot        Cardiac WDL    Cardiac WDL WDL        Peripheral/Neurovascular WDL    Peripheral Neurovascular WDL WDL        Cognitive/Neuro/Behavioral WDL    Cognitive/Neuro/Behavioral WDL WDL

## 2025-07-23 ENCOUNTER — APPOINTMENT (OUTPATIENT)
Dept: PHYSICAL THERAPY | Facility: CLINIC | Age: 75
End: 2025-07-23
Payer: COMMERCIAL

## 2025-07-23 ENCOUNTER — DOCUMENTATION ONLY (OUTPATIENT)
Dept: GERIATRICS | Facility: CLINIC | Age: 75
End: 2025-07-23
Payer: COMMERCIAL

## 2025-07-23 ENCOUNTER — APPOINTMENT (OUTPATIENT)
Dept: OCCUPATIONAL THERAPY | Facility: CLINIC | Age: 75
End: 2025-07-23
Payer: COMMERCIAL

## 2025-07-23 VITALS
TEMPERATURE: 97.4 F | SYSTOLIC BLOOD PRESSURE: 116 MMHG | WEIGHT: 243.83 LBS | HEIGHT: 74 IN | HEART RATE: 63 BPM | OXYGEN SATURATION: 99 % | DIASTOLIC BLOOD PRESSURE: 44 MMHG | BODY MASS INDEX: 31.29 KG/M2 | RESPIRATION RATE: 18 BRPM

## 2025-07-23 PROBLEM — M86.672 CHRONIC OSTEOMYELITIS OF LEFT FOOT (H): Status: ACTIVE | Noted: 2022-04-01

## 2025-07-23 LAB
GLUCOSE BLDC GLUCOMTR-MCNC: 105 MG/DL (ref 70–99)
GLUCOSE BLDC GLUCOMTR-MCNC: 130 MG/DL (ref 70–99)
GLUCOSE BLDC GLUCOMTR-MCNC: 173 MG/DL (ref 70–99)
HGB BLD-MCNC: 10.9 G/DL (ref 13.3–17.7)
HOLD SPECIMEN: NORMAL
MCV RBC AUTO: 91 FL (ref 78–100)

## 2025-07-23 PROCEDURE — G0378 HOSPITAL OBSERVATION PER HR: HCPCS

## 2025-07-23 PROCEDURE — 97535 SELF CARE MNGMENT TRAINING: CPT | Mod: GO

## 2025-07-23 PROCEDURE — 97165 OT EVAL LOW COMPLEX 30 MIN: CPT | Mod: GO

## 2025-07-23 PROCEDURE — 97530 THERAPEUTIC ACTIVITIES: CPT | Mod: GP

## 2025-07-23 PROCEDURE — 250N000012 HC RX MED GY IP 250 OP 636 PS 637

## 2025-07-23 PROCEDURE — 97162 PT EVAL MOD COMPLEX 30 MIN: CPT | Mod: GP

## 2025-07-23 PROCEDURE — 99207 PR NO BILLABLE SERVICE THIS VISIT: CPT | Performed by: INTERNAL MEDICINE

## 2025-07-23 PROCEDURE — 36415 COLL VENOUS BLD VENIPUNCTURE: CPT

## 2025-07-23 PROCEDURE — 85018 HEMOGLOBIN: CPT

## 2025-07-23 PROCEDURE — 99232 SBSQ HOSP IP/OBS MODERATE 35: CPT | Performed by: INTERNAL MEDICINE

## 2025-07-23 PROCEDURE — 82962 GLUCOSE BLOOD TEST: CPT

## 2025-07-23 PROCEDURE — 250N000013 HC RX MED GY IP 250 OP 250 PS 637

## 2025-07-23 RX ORDER — OXYCODONE HYDROCHLORIDE 5 MG/1
5-10 TABLET ORAL EVERY 4 HOURS PRN
Qty: 15 TABLET | Refills: 0 | Status: SHIPPED | OUTPATIENT
Start: 2025-07-23 | End: 2025-07-24

## 2025-07-23 RX ORDER — TRAMADOL HYDROCHLORIDE 50 MG/1
50 TABLET ORAL 4 TIMES DAILY PRN
Qty: 30 TABLET | Refills: 1 | Status: SHIPPED | OUTPATIENT
Start: 2025-07-23 | End: 2025-07-24 | Stop reason: ALTCHOICE

## 2025-07-23 RX ADMIN — INSULIN ASPART 10 UNITS: 100 INJECTION, SOLUTION INTRAVENOUS; SUBCUTANEOUS at 08:46

## 2025-07-23 RX ADMIN — TRAMADOL HYDROCHLORIDE 50 MG: 50 TABLET, COATED ORAL at 08:46

## 2025-07-23 RX ADMIN — EMPAGLIFLOZIN 25 MG: 25 TABLET, FILM COATED ORAL at 08:46

## 2025-07-23 RX ADMIN — GABAPENTIN 600 MG: 300 CAPSULE ORAL at 08:46

## 2025-07-23 RX ADMIN — TORSEMIDE 40 MG: 20 TABLET ORAL at 08:45

## 2025-07-23 RX ADMIN — CARVEDILOL 25 MG: 25 TABLET, FILM COATED ORAL at 08:46

## 2025-07-23 RX ADMIN — INSULIN ASPART 10 UNITS: 100 INJECTION, SOLUTION INTRAVENOUS; SUBCUTANEOUS at 12:29

## 2025-07-23 RX ADMIN — INSULIN ASPART 1 UNITS: 100 INJECTION, SOLUTION INTRAVENOUS; SUBCUTANEOUS at 12:28

## 2025-07-23 RX ADMIN — SPIRONOLACTONE 25 MG: 25 TABLET ORAL at 08:45

## 2025-07-23 RX ADMIN — DULOXETINE HYDROCHLORIDE 30 MG: 30 CAPSULE, DELAYED RELEASE PELLETS ORAL at 08:45

## 2025-07-23 RX ADMIN — SACUBITRIL AND VALSARTAN 1 TABLET: 97; 103 TABLET, FILM COATED ORAL at 08:46

## 2025-07-23 RX ADMIN — ASPIRIN 325 MG: 325 TABLET ORAL at 08:46

## 2025-07-23 RX ADMIN — ACETAMINOPHEN 650 MG: 325 TABLET ORAL at 08:46

## 2025-07-23 RX ADMIN — METFORMIN HYDROCHLORIDE 1000 MG: 500 TABLET ORAL at 08:46

## 2025-07-23 ASSESSMENT — ACTIVITIES OF DAILY LIVING (ADL)
ADLS_ACUITY_SCORE: 50
PREVIOUS_RESPONSIBILITIES: LAUNDRY;MEDICATION MANAGEMENT;FINANCES;DRIVING
ADLS_ACUITY_SCORE: 49
ADLS_ACUITY_SCORE: 50
ADLS_ACUITY_SCORE: 49
ADLS_ACUITY_SCORE: 49
DEPENDENT_IADLS:: CLEANING;COOKING;LAUNDRY;SHOPPING;MEAL PREPARATION
ADLS_ACUITY_SCORE: 50
ADLS_ACUITY_SCORE: 49
ADLS_ACUITY_SCORE: 49
ADLS_ACUITY_SCORE: 50
ADLS_ACUITY_SCORE: 49

## 2025-07-23 NOTE — PROGRESS NOTES
07/23/25 1018   Appointment Info   Signing Clinician's Name / Credentials (PT) Brad Simpson, PT, DPT   Rehab Comments (PT) Patient left sitting in bedside recliner with needs within reach, spouse present, chair alarm on, BLE elevated.   Quick Adds   Quick Adds [facility] Auth & Certification   Living Environment   People in Home child(mihaela), adult;spouse   Current Living Arrangements house   Home Accessibility stairs to enter home;stairs within home   Number of Stairs, Main Entrance 3   Stair Railings, Main Entrance railings safe and in good condition   Number of Stairs, Within Home, Primary greater than 10 stairs   Stair Railings, Within Home, Primary railings safe and in good condition   Transportation Anticipated family or friend will provide   Living Environment Comments Pt returned home from hospital on weekend unable to care for self, slept in recliner on first floor   Self-Care   Equipment Currently Used at Home shower chair;crutches  (was issued FWW last admit)   Fall history within last six months yes   Number of times patient has fallen within last six months 1   Activity/Exercise/Self-Care Comment Prior to SHADI, was indep with mobility with crutches and L boot (heel weight bearing), indep bADL   General Information   Onset of Illness/Injury or Date of Surgery 07/22/25   Referring Physician Loretta Ordonez, SAHRA   Pertinent History of Current Problem (include personal factors and/or comorbidities that impact the POC) left leg weakness - left SHADI after a fall 7/18; Charcot's joint of left foot [facility]   Existing Precautions/Restrictions fall;weight bearing   Weight-Bearing Status - LLE (S)  weight-bearing as tolerated;partial weight-bearing (% in comments);other (see comments)  (per previous admission: s/p L SHADI is WBAT, but L foot is 2.5 months s/p sx and Heel-touch WB thru L LLE with Boot donned at all times)   Cognition   Cognitive Status Comments some difficulty answering  questions and recalling timeline of events   Integumentary/Edema   Integumentary/Edema Comments increased LLE edema, reports typically wears tubigrip at home   Range of Motion (ROM)   Range of Motion ROM deficits secondary to weakness   Strength (Manual Muscle Testing)   Strength (Manual Muscle Testing) Deficits observed during functional mobility   Bed Mobility   Comment, (Bed Mobility) patient in recliner at beginning and end of session   Transfers   Transfers sit-stand transfer   Sit-Stand Transfer   Sit-Stand Sebastian (Transfers) contact guard;verbal cues   Assistive Device (Sit-Stand Transfers) walker, front-wheeled   Gait/Stairs (Locomotion)   Sebastian Level (Gait) contact guard;verbal cues   Assistive Device (Gait) walker, front-wheeled   Distance in Feet (Gait) initial 8ft   Comment, (Gait/Stairs) deferred stairs on eval due to L boot not present and difficulty managing heel weight bearing   Clinical Impression   Criteria for Skilled Therapeutic Intervention Yes, treatment indicated   PT Diagnosis (PT) impaired functional mobility   Influenced by the following impairments impaired balance, impaired strength, impaired activity tolerance, impaired cognition, pain, LLE edema   Functional limitations due to impairments impaired transfers, impaired gait   Clinical Presentation (PT Evaluation Complexity) evolving   Clinical Presentation Rationale clinical judgement   Clinical Decision Making (Complexity) moderate complexity   Planned Therapy Interventions (PT) balance training;bed mobility training;gait training;home exercise program;neuromuscular re-education;patient/family education;stair training;strengthening;transfer training;progressive activity/exercise   Risk & Benefits of therapy have been explained evaluation/treatment results reviewed;care plan/treatment goals reviewed;participants included;patient   PT Total Evaluation Time   PT Eval, Moderate Complexity Minutes (07239) 10   Therapy  Certification   Start of care date 07/23/25   Certification date from 07/23/25   Certification date to 07/30/25   Medical Diagnosis left leg weakness - left SHADI after a fall 7/18   [facility] Authorization Information   Condition type Initial onset (within last 3 months)   Cause of current episode Unspecified   Nature of treatment Rehabilitative   Functional ability Moderate functional limitations   Documented POC (choose all that apply) Measurable short and long term/discharge treatment goals related to physical and functional deficits.;Frequency of treatment visits and treatment activities to address deficit areas.;Patient agrees to program participation including home program   Briefly describe symptoms Limited strength left leg, pain left hip/leg and foot   How did the symptoms start Fell and broke my hip, after the hip replacement my leg felt weak and it felt like it moved wrong a couple times.   Last 24H: avg pain/symptom intensity  7/10   Past wk: avg pain/symptom intensity 7/10   Frequency of Symptoms Frequently (51-75% of the time)   Symptom impact on ADLs Moderately   Condition change since eval N/A (first visit)   General health reported by patient Fair   Physical Therapy Goals   PT Frequency 6x/week   PT Predicted Duration/Target Date for Goal Attainment 07/30/25   PT Goals Bed Mobility;Transfers;Gait;Stairs   PT: Bed Mobility Supervision/stand-by assist;Supine to/from sit;Within precautions   PT: Transfers Supervision/stand-by assist;Sit to/from stand;Bed to/from chair;Assistive device;Within precautions   PT: Gait Supervision/stand-by assist;Rolling walker;100 feet;Within precautions   PT: Stairs 10 stairs;Supervision/stand-by assist;Rail on both sides   Interventions   Interventions Quick Adds Therapeutic Activity   Therapeutic Activity   Therapeutic Activities: dynamic activities to improve functional performance Minutes (21404) 18   Symptoms Noted During/After Treatment Increased pain   Treatment  Detail/Skilled Intervention Patient with difficulty relaying restrictions from L charcot foot surgery, no boot present. Spouse to bring boot later today for progressive mobility. Patient with difficulty maintaining heel weight bearing for transfers and short distance gait with FWW. Patient ambulated an additional ~8ft with FWW, CGA, and cues for heel weight bearing. Patient able to perform repeated sit<>stand x 3 with FWW with repeated cues for L heel weight bearing. Discussed potential icing for pain/edema and tubigrip for edema. Patient and spouse with brief questions regarding TCU, answered to my ability.   PT Discharge Planning   PT Plan Wed 1/6 - LLE heel weight bearing in boot, recent SHADI, progress mobility as able FWW, has 10 stairs at home, failed dc home after SHADI   PT Discharge Recommendation (DC Rec) Transitional Care Facility   PT Rationale for DC Rec Patient is currently requiring hands on assist for all mobility with FWW with cues for PWB LLE. Patient unable to maintain heel weight bearing LLE for limited distance within the room (no boot present). As patient was unable to manage his cares at home following recent SHADI, recommend dc to TCU to progress mobility, strength, balance, ADL, activity tolerance, and adherance to heel weight bearing restrictions.   PT Brief overview of current status CGA transfers FWW, CGA amb 16ft FWW with cues for heel WBing (unable to consistently maintain)   PT Total Distance Amb During Session (feet) 16   PT Equipment Needed at Discharge   (was issued FWW 7/20/25)   Physical Therapy Time and Intention   Timed Code Treatment Minutes 18   Total Session Time (sum of timed and untimed services) 28       [facility]                                                                                   OUTPATIENT PHYSICAL THERAPY    PLAN OF TREATMENT FOR OUTPATIENT REHABILITATION   Patient's Last Name, First Name, Reilly James Date of Birth:   1950   Provider's Name   [facility]   Medical Record No.  [id number]     Onset Date: 07/22/25 Start of Care Date: 07/23/25     Medical Diagnosis:  left leg weakness - left SHADI after a fall 7/18               PT Diagnosis:  impaired functional mobility Certification Dates:  From: 07/23/25  To: 07/30/25       See note for plan of treatment, functional goals, and certification details.    I CERTIFY THE NEED FOR THESE SERVICES FURNISHED UNDER        THIS PLAN OF TREATMENT AND WHILE UNDER MY CARE (Physician co-signature of this document indicates review and certification of the therapy plan).

## 2025-07-23 NOTE — CONSULTS
[facility] Consultation    Consultation - [facility]  Level of consult: One-time consult to assist in determining a diagnosis and to recommend an appropriate treatment plan    Reilly Borja,  1950, MRN [id number]     Admitting Dx: Left leg weakness [R29.898]     [facility]     Code status:  Full Code     Extended Emergency Contact Information  Primary Emergency Contact: Madison Woods  Address: [address] [place] [address]  Home Phone: [phone]  Mobile Phone: [phone]  Relation: Significant other     Assessment:    Left leg swelling and weakness s/p left SHADI on 25 for femoral neck fracture  Deconditioning with history of left Charcot foot and use of crutches    Plan:  This patient was discussed with Dr.Erik Vasquez, pt's surgeon for [facility] and they are in agreement with the following plan.   Pt is able to partially flex the left hip, fully extend the left knee, partially abduct/adduct the leg. His sensation is at baseline.   CT of the lumbar spine is without any obvious nerve impingement. No concern for nerve injury or impingement at this time.   Left hip weakness is almost certainly due to the combination of chronic deconditioning secondary to the left Charcot foot and recent left SHADI with postoperative swelling and pain. This was likely worsened by limited movement after he was discharged home.   Encouraged pt to work on hip strength and motion and discussed the post-operative course with swelling, weakness and pain.   He usually wears a specific boot on the left foot and his SO will be bringing that in today for mobility.   Follow up in 1-2 weeks as scheduled with 's team for post op recheck.  Orthopedically stable. Will sign off. Please consult again if further orthopedic needs arise.    Thank you for including [facility] in the care of Reilly  REBEKAH Borja. It has been a pleasure participating in his care.      Abhijit Rodrigues PA-C  [facility]    Principal Problem:    Left leg weakness  Active Problems:    Essential hypertension    Type 2 diabetes mellitus with complication, with long-term current use of insulin (H)    (HFpEF) heart failure with preserved ejection fraction (H)    Iron deficiency anemia, unspecified    Nonalcoholic fatty liver disease    Charcot's joint of left foot    Status post total replacement of left hip       Chief Complaint  Left hip weakness and pain s/p left SHADI on 7/18      HPI  The patient is seen in orthopedic consultation at the request of Loretta Ordonez PA-C.  The patient is a 75 year old male with moderate swelling, weakness and pain of the left  hip. The patient has past medical history of hypertension, charcot joint of left foot, nonalcoholic fatty liver disease, T2DM with insulin use and HFpEF. [facility] Charcot [facility] and reports that he usually wears a specific boot and uses crutches for mobility. He fell on 7/18 and sustained a left femoral neck fracture; he underwent a left SHADI by  for this the same day. He was seen by OT and PT; per PT notes he reported feeling weak but was able to slowly complete ambulation and stairs. He discharged home with home care on 7/20. On 7/22, he was brought back to the [facility] ED by EMS reporting that he had been unable to move out of his recliner since discharge and that he had increased swelling in the LLE with pain in the left heel. US was negative for DVT and a lumbar CT was negative for impingement. This morning he was able to work with PT using the FWW and ambulate 8 feet in the room, perform multiple sit-stands. Currently, the pt notes mild left lateral buttock pain with increased swelling throughout the left leg into the foot. He states that he can't lift the leg at all, but then is able to  partially flex the left hip. He denies any changes to his sensation.     History is obtained from the patient and electronic health record     Past Medical History  Past Medical History:   Diagnosis Date    Congestive heart failure (H)     Diabetes (H)     Hypertension     Iron deficiency anemia secondary to inadequate dietary iron intake        Surgical History  Past Surgical History:   Procedure Laterality Date    ARTHRODESIS FOOT Left 9/13/2023    Procedure: Left Talonavicular and Calcanecuboid Joint Fusion;  Surgeon: Orlando Duarte MD;  Location: [facility]    ARTHROPLASTY HIP Left 7/18/2025    Procedure: ARTHROPLASTY, LEFT HIP, TOTAL;  Surgeon: Mich Vasquez MD;  Location: [facility] OR    ARTHROSCOPY KNEE Left     IRRIGATION AND DEBRIDEMENT LOWER EXTREMITY, COMBINED Left 9/17/2021    Procedure: IRRIGATION AND DEBRIDEMENT  WITH BONE BIOPSY LEFT FOOT;  Surgeon: Julius Campbell DO;  Location: [facility] OR    OTHER SURGICAL HISTORY Left     surgery for charcot of left foot    REMOVE HARDWARE LOWER EXTREMITY Left 9/17/2021    Procedure: REMOVAL OF HARDWARE LEFT FOOT DEEP;  Surgeon: Julius Campbell DO;  Location: [facility] LUMBAR SPINE FUSN,POST INTRBDY Right 02/06/2017    Procedure: RIGHT L5-S1 TRANSFORAMINAL LUMBAR INTERBODY FUSION;  Surgeon: Rajiv Zavala MD;  Location: [facility];  Service: Spine        Social History  Social History     Socioeconomic History    Marital status: Single     Spouse name: Not on file    Number of children: Not on file    Years of education: Not on file    Highest education level: Not on file   Occupational History    Not on file   Tobacco Use    Smoking status: Never     Passive exposure: Never    Smokeless tobacco: Never   Vaping Use    Vaping status: Never Used   Substance and Sexual Activity    Alcohol use: Not Currently    Drug use: Never    Sexual activity: Yes     Partners: Female   Other Topics Concern     Parent/sibling w/ CABG, MI or angioplasty before 65F 55M? Not Asked   Social History Narrative    Not on file     Social Drivers of Health     Financial Resource Strain: Low Risk  (7/22/2025)    Financial Resource Strain     Within the past 12 months, have you or your family members you live with been unable to get utilities (heat, electricity) when it was really needed?: No   Food Insecurity: Low Risk  (7/22/2025)    Food Insecurity     Within the past 12 months, did you worry that your food would run out before you got money to buy more?: No     Within the past 12 months, did the food you bought just not last and you didn t have money to get more?: No   Transportation Needs: Low Risk  (7/22/2025)    Transportation Needs     Within the past 12 months, has lack of transportation kept you from medical appointments, getting your medicines, non-medical meetings or appointments, work, or from getting things that you need?: No   Physical Activity: Not on file   Stress: Not on file   Social Connections: Not on file   Interpersonal Safety: Low Risk  (7/22/2025)    Interpersonal Safety     Do you feel physically and emotionally safe where you currently live?: Yes     Within the past 12 months, have you been hit, slapped, kicked or otherwise physically hurt by someone?: No     Within the past 12 months, have you been humiliated or emotionally abused in other ways by your partner or ex-partner?: No   Housing Stability: Low Risk  (7/22/2025)    Housing Stability     Do you have housing? : Yes     Are you worried about losing your housing?: No       Family History  Family History   Problem Relation Age of Onset    Venous thrombosis Mother     Diabetes Mother     Anesthesia Reaction No family hx of         Allergies:  Patient has no known allergies.      Current Medications:  Current Facility-Administered Medications   Medication Dose Route Frequency Provider Last Rate Last Admin    acetaminophen (TYLENOL) tablet 650 mg  650 mg  Oral Q4H PRN Loretta Ordonez PA-C   650 mg at 07/23/25 0846    Or    acetaminophen (TYLENOL) Suppository 650 mg  650 mg Rectal Q4H PRN Loretta Ordonez PA-C        aspirin (ASA) EC tablet 325 mg  325 mg Oral Daily Loretta Ordonez PA-C   325 mg at 07/23/25 0846    carvedilol (COREG) tablet 25 mg  25 mg Oral Daily Loretta Ordonez PA-C   25 mg at 07/23/25 0846    glucose gel 15-30 g  15-30 g Oral Q15 Min PRN Loretta Ordonez PA-C        Or    dextrose 50 % injection 25-50 mL  25-50 mL Intravenous Q15 Min PRN Loretta Ordonez PA-C        Or    glucagon injection 1 mg  1 mg Subcutaneous Q15 Min PRN Loretta Ordonez PA-C        DULoxetine (CYMBALTA) DR capsule 30 mg  30 mg Oral Daily Loretta Ordonez PA-C   30 mg at 07/23/25 0845    empagliflozin (JARDIANCE) tablet 25 mg  25 mg Oral QAM Loretta Ordonez PA-C   25 mg at 07/23/25 0846    gabapentin (NEURONTIN) capsule 600 mg  600 mg Oral BID Loretta Ordonez PA-C   600 mg at 07/23/25 0846    gabapentin (NEURONTIN) capsule 900 mg  900 mg Oral At Bedtime Loretta Ordonez PA-C   900 mg at 07/22/25 2111    hydrOXYzine HCl (ATARAX) tablet 25 mg  25 mg Oral Q6H PRN Loretta Ordonez PA-C        insulin aspart (NovoLOG) injection (RAPID ACTING)  1-7 Units Subcutaneous TID AC Loretta Ordonez PA-C        insulin aspart (NovoLOG) injection (RAPID ACTING)  1-5 Units Subcutaneous At Bedtime Loretta Ordonez PA-C        insulin aspart (NovoLOG) injection (RAPID ACTING)  10 Units Subcutaneous TID w/meals Loretta Ordonez PA-C   10 Units at 07/23/25 0846    insulin glargine (LANTUS PEN) injection 25 Units  25 Units Subcutaneous QPM Loretta Ordonez PA-C   25 Units at 07/22/25 2003    insulin glargine (LANTUS PEN) injection 30 Units  30 Units Subcutaneous QAM Loretta Ordonez PA-C   30 Units at 07/23/25 0846    metFORMIN (GLUCOPHAGE) tablet 1,000 mg  1,000 mg Oral Daily with breakfast Loretta Ordonez PA-C   1,000  mg at 07/23/25 0846    naloxone (NARCAN) injection 0.2 mg  0.2 mg Intravenous Q2 Min PRN Jude Keenan MD        Or    naloxone (NARCAN) injection 0.4 mg  0.4 mg Intravenous Q2 Min PRN Jude Keenan MD        Or    naloxone (NARCAN) injection 0.2 mg  0.2 mg Intramuscular Q2 Min PRN Jude Keenan MD        Or    naloxone (NARCAN) injection 0.4 mg  0.4 mg Intramuscular Q2 Min PRN Jude Keenan MD        ondansetron (ZOFRAN ODT) ODT tab 4 mg  4 mg Oral Q6H PRN Loretta Ordonez PA-C        Or    ondansetron (ZOFRAN) injection 4 mg  4 mg Intravenous Q6H PRN Loretta Ordonez PA-REBEKAH        prochlorperazine (COMPAZINE) injection 5 mg  5 mg Intravenous Q6H PRN Loretta Ordonez PA-C        Or    prochlorperazine (COMPAZINE) tablet 5 mg  5 mg Oral Q6H PRN Loretta Ordonez PA-C        sacubitril-valsartan (ENTRESTO)  MG per tablet 1 tablet  1 tablet Oral Daily Loretta Ordonez PA-C   1 tablet at 07/23/25 0846    senna-docusate (SENOKOT-S/PERICOLACE) 8.6-50 MG per tablet 1 tablet  1 tablet Oral BID PRN Loretta Ordonez PA-C        Or    senna-docusate (SENOKOT-S/PERICOLACE) 8.6-50 MG per tablet 2 tablet  2 tablet Oral BID PRN Loretta Ordonez PA-C        spironolactone (ALDACTONE) tablet 25 mg  25 mg Oral Daily Loretta Ordonez PA-C   25 mg at 07/23/25 0845    torsemide (DEMADEX) tablet 40 mg  40 mg Oral Daily Loretta Ordonez PA-C   40 mg at 07/23/25 0845    traMADol (ULTRAM) tablet 50 mg  50 mg Oral 4x Daily PRN Loretta Ordonez PA-C   50 mg at 07/23/25 0846       Review of Systems:  See H&P     Physical Exam:  Temp:  [97.4  F (36.3  C)-98.4  F (36.9  C)] 97.4  F (36.3  C)  Pulse:  [63-77] 63  Resp:  [16-18] 18  BP: (104-131)/(44-70) 116/44  SpO2:  [88 %-99 %] 99 %    General: On examination, the patient is resting comfortably, NAD, awake, and alert and oriented to person, place, and general circumstances. Seated in the recliner, fully dressed.  SKIN: Diffuse circumferential swelling  noted throughout the left leg.   ROM: Full extension and flexion of the left knee actively without pain, able to flex/extend the left ankle. Able to partially flex the left hip with increased pan; AAROM improved with repetition to about 100 degrees of flexion. Able to actively abduct/adduct while seated with mild pain      Pertinent Labs  Lab Results: personally reviewed.  Lab Results   Component Value Date    WBC 5.9 07/22/2025    HGB 10.9 (L) 07/23/2025    HCT 34.6 (L) 07/22/2025    MCV 91 07/23/2025     07/22/2025     Recent Labs   Lab 07/18/25  0356   INR 1.01       Pertinent Radiology  Radiology Results: images and radiology report reviewed  Recent Results (from the past 24 hours)                      CT Lumbar Spine w/o Contrast    Narrative    EXAM: CT LUMBAR SPINE W/O CONTRAST  LOCATION: [facility]  DATE: 7/22/2025    INDICATION: 75M, recent fall with left fem neck fx, here today due to inability to flex LLE off of bed 2 2 weakness  COMPARISON: None.  TECHNIQUE: Routine CT Lumbar Spine without IV contrast. Multiplanar reformats. Dose reduction techniques were used.     FINDINGS:  No evidence of acute fracture or posttraumatic subluxation involving the lumbar spine. Fusion hardware at L5-S1 with solid osseous fusion. Multilevel mild degenerative disc disease and facet arthropathy. Disc bulging contributes to mild to moderate   spinal canal stenoses at L3-4 and L4-5. Mild foraminal stenoses at multiple levels. Scattered vascular calcifications. Distention of the bladder. Left hip prosthesis with streak artifact. Presumed atelectasis at the lung bases. Probable small sliding   hiatal hernia.      Impression    IMPRESSION:  1.  No evidence of acute fracture or posttraumatic subluxation.   US Lower Extremity Venous Duplex Left    Narrative    EXAM: US LOWER EXTREMITY VENOUS DUPLEX LEFT  LOCATION: [facility]  DATE: 7/22/2025    INDICATION: 75M, recent  hip arthroplasty, here with unilateral left leg edema  COMPARISON: None.  TECHNIQUE: Venous Duplex ultrasound of the left lower extremity with and without compression, augmentation and duplex. Color flow and spectral Doppler with waveform analysis performed.    FINDINGS: Exam includes the common femoral, femoral, popliteal, and contralateral common femoral veins as well as segmentally visualized deep calf veins and greater saphenous vein.     LEFT: No deep vein thrombosis. No superficial thrombophlebitis. No popliteal cyst.      Impression    IMPRESSION:  1.  No deep venous thrombosis in the left lower extremity.       Attestation:  I have reviewed today's vital signs, notes, medications, labs and imaging.     Abhijit Rodrigues PA-C

## 2025-07-23 NOTE — PLAN OF CARE
Physical Therapy Discharge Summary    Reason for therapy discharge:    Discharged to transitional care facility.    Progress towards therapy goal(s). See goals on Care Plan in [facility] electronic health record for goal details.  Goals partially met.  Barriers to achieving goals:   discharge on same date as initial evaluation.    Therapy recommendation(s):    Continued therapy is recommended.  Rationale/Recommendations:  Recommend continued PT at TCU to maximize safe and indep mobility.

## 2025-07-23 NOTE — PROGRESS NOTES
[facility] DISCHARGE NOTE    Patient discharged to transitional care unit at 2:04 PM via wheel chair. Accompanied by spouse and staff. Discharge instructions reviewed with patient and nursing at [facility] , opportunity offered to ask questions. Prescriptions sent with patient to fill . All belongings sent with patient.    Lis Greer RN

## 2025-07-23 NOTE — PROGRESS NOTES
[facility]ATR[facility]  INITIAL VISIT NOTE      PRIMARY CARE PROVIDER AND CLINIC: [facility] [address] / [facility] [address]    [facility] Medical Record Number: [id number]  [facility] [[id number]]    Chief Complaint   Patient presents with    Hospital F/U     [facility] 7/22/2025 - 7/23/2025     HPI:    Reilly Borja is a 75 year old (1950) male was admitted to the above facility from [facility]. Hospital stay 7/22 through 7/23/25 where they were admitted for left leg weakness. Now admitted to this facility for rehab, medical management, and nursing care.      History obtained from: facility chart records, facility staff, patient report, and [facility] chart review.      Brief Hospital Course: PMH of HTN, charcot joint of left foot, nonalcoholic fatty liver disease, DM2, CHF, left SHADI after fall with hip fracture on 7/18 who presented with left leg weakness. Has not been able to manage at home after hip surgery, unable to get out OOB. Ortho consulted. CT of lumbar spine negative, no DVT. Weakness felt likely due to deconditioning.  Other chronic stable. TCU recommended. When medically stable was discharged to TCU for further rehab and medical management.     TCU Course: Seen this AM sitting up in recliner. He is very tired, did not sleep well last night. Is having some pain in his foot, does feel pain medication helps with this. Feels like left leg is still weak. Denies any chest pain or SOB. Is having bowel movements.Appetite ok. Did doze off during visit.    CODE STATUS/ADVANCE DIRECTIVES: CPR/Full code     ALLERGIES:  No Known Allergies    PAST MEDICAL HISTORY:   Past Medical History:   Diagnosis Date    Congestive heart failure (H)     Diabetes (H)     Hypertension     Iron deficiency anemia secondary to inadequate dietary iron intake      PAST  SURGICAL HISTORY:   Past Surgical History:   Procedure Laterality Date    ARTHRODESIS FOOT Left 9/13/2023    Procedure: Left Talonavicular and Calcanecuboid Joint Fusion;  Surgeon: Orlando Duarte MD;  Location[facility]    ARTHROPLASTY HIP Left 7/18/2025    Procedure: ARTHROPLASTY, LEFT HIP, TOTAL;  Surgeon: Mich Vasquez MD;  Location: [facility] OR    ARTHROSCOPY KNEE Left     IRRIGATION AND DEBRIDEMENT LOWER EXTREMITY, COMBINED Left 9/17/2021    Procedure: IRRIGATION AND DEBRIDEMENT  WITH BONE BIOPSY LEFT FOOT;  Surgeon: Julius Campbell DO;  Location: [facility] OR    OTHER SURGICAL HISTORY Left     surgery for charcot of left foot    REMOVE HARDWARE LOWER EXTREMITY Left 9/17/2021    Procedure: REMOVAL OF HARDWARE LEFT FOOT DEEP;  Surgeon: Julius Campbell DO;  Location: [facility] LUMBAR SPINE FUSN,POST INTRBDY Right 02/06/2017    Procedure: RIGHT L5-S1 TRANSFORAMINAL LUMBAR INTERBODY FUSION;  Surgeon: Rajiv Zavala MD;  Location: [facility];  Service: Spine     FAMILY HISTORY:   Family History   Problem Relation Age of Onset    Venous thrombosis Mother     Diabetes Mother     Anesthesia Reaction No family hx of          SOCIAL HISTORY:   Patient's living condition: lives with significant other    MEDICATIONS  Post Discharge Medication Reconciliation Status: discharge medications reconciled and changed, per note/orders.  Current Outpatient Medications   Medication Sig Dispense Refill    oxyCODONE (ROXICODONE) 5 MG tablet Take 1-2 tablets (5-10 mg) by mouth every 4 hours as needed for moderate to severe pain. 40 tablet 0    acetaminophen (TYLENOL) 500 MG tablet Take 500-1,000 mg by mouth every 6 hours as needed for mild pain.      aspirin (ASA) 325 MG EC tablet Take 1 tablet (325 mg) by mouth daily. 30 tablet 0    carvedilol (COREG) 25 MG tablet Take 25 mg by mouth daily with food.      DULoxetine (CYMBALTA) 30 MG capsule Take 30 mg by mouth daily.       "empagliflozin (JARDIANCE) 25 MG TABS tablet Take 25 mg by mouth every morning      gabapentin (NEURONTIN) 300 MG capsule Take 600 mg by mouth 2 times daily. 2 capsules (600 mg) morning and afternoon. This is in addition to the bedtime dose of 900 mg.      gabapentin (NEURONTIN) 300 MG capsule Take 900 mg by mouth at bedtime. This is in addition to the morning and afternoon doses.      hydrOXYzine HCl (ATARAX) 25 MG tablet Take 1 tablet (25 mg) by mouth every 6 hours as needed for itching or anxiety (with pain, moderate pain). 30 tablet 0    insulin aspart (NOVOLOG PEN) 100 UNIT/ML pen Inject 10 Units subcutaneously 3 times daily (with meals).      insulin glargine (LANTUS VIAL) 100 UNIT/ML vial Inject 25 Units subcutaneously every evening.      insulin glargine (LANTUS VIAL) 100 UNIT/ML vial Inject 30 Units subcutaneously every morning.      metFORMIN (GLUCOPHAGE) 1000 MG tablet Take 1,000 mg by mouth daily (with breakfast).      sacubitril-valsartan (ENTRESTO)  MG per tablet Take 1 tablet by mouth daily.      semaglutide (OZEMPIC, 1 MG/DOSE,) 2 MG/1.5ML pen Inject 1 mg subcutaneously every 7 days. On Sunday      senna-docusate (SENOKOT-S/PERICOLACE) 8.6-50 MG tablet Take 1-2 tablets by mouth 2 times daily as needed for constipation. Take while on oral narcotics to prevent or treat constipation. 30 tablet 0    spironolactone (ALDACTONE) 25 MG tablet Take 25 mg by mouth daily.      torsemide (DEMADEX) 20 MG tablet Take 40 mg by mouth daily.      traMADol (ULTRAM) 50 MG tablet Take 1 tablet (50 mg) by mouth 4 times daily as needed for moderate to severe pain. 30 tablet 1     ROS:  10 point ROS neg other than the symptoms noted above in the HPI.      PHYSICAL EXAM:  BP (!) 143/76   Pulse 72   Temp 97.2  F (36.2  C)   Resp 18   Ht 1.88 m (6' 2\")   Wt 111 kg (244 lb 12.8 oz)   SpO2 92%   BMI 31.43 kg/m    Physical Exam  Cardiovascular:      Rate and Rhythm: Normal rate and regular rhythm.      Heart sounds: " Normal heart sounds.   Pulmonary:      Effort: Pulmonary effort is normal.      Breath sounds: Normal breath sounds.   Abdominal:      General: Bowel sounds are normal.      Palpations: Abdomen is soft.   Musculoskeletal:      Left lower leg: Edema present.      Comments: Decreased ROM to left hip   Skin:     Comments: Ace wrap to LLE CDI   Neurological:      General: No focal deficit present.      Mental Status: He is alert.   Psychiatric:         Mood and Affect: Mood normal.        LABORATORY/IMAGING DATA:  Reviewed as per [facility] and/or [facility]    ASSESSMENT/PLAN:  Closed fracture of neck of left femur with routine healing, subsequent encounter  Status post total replacement of left hip  Left leg weakness  Physical deconditioning  Secondary to fall, Pain appears controlled, mobility limited.  Discussed with patient, nursing staff.   - PT/OT  - discontinue tramadol to avoid confusion/reduce risk of over medication  - continue oxycodone 5-10mg PRN, APAP PRN, hydroxyzine PRN  - TTWB to LLE due to charcot foot  - DVT ppx with ASA  - follow-up with ortho     Chronic heart failure with preserved ejection fraction (H)  Essential hypertension  Appears compensated. Echo in 2023 with EF of 50-55%, some vascular congestion noted on CXR while IP  - continue Coreg, spironolactone, torsemide  - daily weights  - monitor and adjust     Type 2 diabetes mellitus with complication, with long-term current use of insulin (H)  A1C 7.8%, BG  since TCU admission, but limited data to trend  - continue Jardiance, Aspart 10 units TID with meals, metformin with breakfast, glargine 30 units q AM, 25 units at bedtime, Ozempic weekly   - monitor and adjsut     Lumbar radiculopathy  S/P spinal surgery  Chronic pain, appears controlled  - gabapentin BID  - other analgesia as above     Iron deficiency anemia, unspecified iron deficiency anemia type  Hgb 12.8->9.5->10.9 post-op  - check Hgb PRN    Charcot's joint of left foot  [facility], discussed with PT  - heel touch weight bearing  - CAM boot hen ambulation     Nonalcoholic fatty liver disease  No concerns currently  - monitor , check LFT PRN    Cognitive impairment  SLUMS 17/30, which indicates dementia.   - OT to complete CPT  - supportive cares           Orders:   Discontinue tramadol      Total time spent with patient visit at the skilled nursing facility was 45 min including patient visit and review of past records; includes time spent reviewing records from hospitalization within my organization including labs, imaging reports and provider/consult notes, review of TCU facility records, medication reconciliation, discussion of plan of care with patient, nursing staff and therapy as stated above as well as time spent on documentation.      Electronically signed by:  ANGELO Patel CNP

## 2025-07-23 NOTE — DISCHARGE SUMMARY
[facility] Hospitalist Discharge Summary    Reilly Borja MRN# [id number]   Age: 75 year old YOB: 1950     Date of Admission:  7/22/2025  Date of Discharge::  7/23/2025  Admitting Physician:  Jude Keenan MD  Discharge Physician:  Jude Keenan MD  Primary Physician: [facility]  Transferring Facility: N/A     [facility]          Admission Diagnoses:   Left leg weakness [R29.898]          Discharge Diagnosis:     Principle diagnosis: Left leg weakness   Secondary diagnoses:  Patient Active Problem List   Diagnosis    Essential hypertension    Lumbar radiculopathy    Type 2 diabetes mellitus with complication, with long-term current use of insulin (H)    S/P spinal surgery    (HFpEF) heart failure with preserved ejection fraction (H)    Iron deficiency anemia, unspecified    Vitreous degeneration of right eye    Hx of gout    Pain in left ankle    Personal history of colon polyps, unspecified    Astigmatism    Blepharochalasis    Cataract    Nonalcoholic fatty liver disease    Overweight    Presbyopia    Pure hypercholesterolemia    Charcot's joint of left foot    Status post debridement    Pain in left foot    Closed fracture of neck of left femur, initial encounter (H)    S/P total hip arthroplasty    Status post total replacement of left hip    Left leg weakness          Brief History of Presenting Illness:   As per admit hx  Reilly Borja is a 75 year old male with past medical history of hypertension, charcot joint of left foot, nonalcoholic fatty liver disease, T2DM with insulin use, HFpEF, recent hip replacement now presents on 7/22/2025 with managing poorly at home following hip replacement.      Patient reports using crutches and non-weightbearing on his left leg for at least the past 2 months. Recently fell while ambulating with crutches, broke his left hip. Now s/p repair. Feels like since the arthroplasty he's not been able to lift or move his leg. He was  discharged home from the hospital but has been unable to get himself up out of a chair since discharge because he feels like he can't move his leg. It's profoundly weak, originating from the hip. Has pain radiating down his entire leg which he feels is worse in his anterior thigh. He has some neuropathic twinges in his left posterior ankle. No numbness or tingling. Denies fevers or chills. Denies dyspnea; no chest pain or pleurisy. No falls.   Was unable to move his leg at home so could not ambulate & felt he needed more help. He has not been doing therapy exercises.          Hospital Course:   Left leg weakness    Presents with profound left leg weakness in setting of left SHADI after a fall 7/18. Was non-weightbearing on leg for 2 months prior to SHADI 2/2 charcot foot (was using crutches). Seen by OT post-op day 1, who recommended PT eval. No PT note completed, but per discharge summary patient was seen & did ok with PT. Was discharged home, & has not been able to get out of bed since discharge. Unable to lift leg up at all. No numbness.     CT lumbar spine w/out contrast negative.  Venous duplex US of left leg negative for DVT.     Most likely left leg weakness due to chronic deconditioning from offloading 2/2 Charcot foot (below) exacerbated by recent hip arthroplasty.   PT seen-recommend TCU.      Fall, causing left femoral neck fracture  Now S/p Left SHADI 7/18/25    Mechanical fall 7/18 causing hip fracture, now s/p arthroplasty. Discharged home with aspirin, tramadol for pain control. Feels weakness in thigh since surgery; can't lift his leg up.    Orthopedic surgery seen pt. Encouraged pt to work on hip strength and motion and discussed the post-operative course with swelling, weakness and pain.   - Continue PTA aspirin 325mg daily for VTE prophylaxis per ortho  - Continue PTA tramadol for pain control  -Follow up in 1-2 weeks as scheduled with 's team for post op recheck.      Charcot's joint of left  foot    Has had multiple surgeries, most [facility]). Had been using crutches to ambulate since his last surgery.      (HFpEF) heart failure with preserved ejection fraction    ECHO 2023 LVEF 50-55%, no wall motion abnormalities. On GDMT for HFrEF so unclear if she had subsequent reduced EF that we cannot see?     CXR 7/22 shows some increased pulmonary vascular congestion. Clinically appears grossly euvolemic. Doubt HF exacerbation.   - Continue PTA carvedilol 25mg daily   - Continue PTA spironolactone 25mg daily    - Continue PTA torsemide 40mg daily      Essential hypertension    Normotensive on admission.   - Continue PTA Entresto as per diabetes, below     Type 2 diabetes mellitus     Hemoglobin A1c 7.8% on 7/18/2025.     Glucose 302 on presentation.      - Continue PTA insulin aspart 10u TID with meals  - Continue PTA insulin glargine 30u in AM, 25u in PM  - Continue PTA empagliflozin 25mg daily   - Continue PTA metformin 100mg daily with breakfast  - Continue PTA sacubitril-valsartan 97-103mg daily   - Continue PTA gabapentin 600mg BID & 900mg at bedtime     Iron deficiency anemia    Baseline hemoglobin  11.5 - 12.5. Hgb on presentation 10.8.   - Hemoglobin in AM      Hyperlipidemia    Noted in history; not on any outpatient pharmacologic management.      Anxiety  - Continue PTA duloxetine 30mg daily      Nonalcoholic fatty liver disease    Noted in history. Recommend ongoing PCP follow up for surveillance & lifestyle interventions.                 Procedures:   No procedures performed during this admission         Allergies:    No Known Allergies          Medications Prior to Admission:     Medications Prior to Admission   Medication Sig Dispense Refill Last Dose/Taking    acetaminophen (TYLENOL) 500 MG tablet Take 500-1,000 mg by mouth every 6 hours as needed for mild pain.   7/21/2025    aspirin (ASA) 325 MG EC tablet Take 1 tablet (325 mg) by mouth daily. 30  tablet 0 7/22/2025 Morning    carvedilol (COREG) 25 MG tablet Take 25 mg by mouth daily with food.   7/22/2025 Morning    DULoxetine (CYMBALTA) 30 MG capsule Take 30 mg by mouth daily.   7/22/2025 Morning    empagliflozin (JARDIANCE) 25 MG TABS tablet Take 25 mg by mouth every morning   7/22/2025 Morning    gabapentin (NEURONTIN) 300 MG capsule Take 600 mg by mouth 2 times daily. 2 capsules (600 mg) morning and afternoon. This is in addition to the bedtime dose of 900 mg.   7/22/2025 at  2:00 PM    gabapentin (NEURONTIN) 300 MG capsule Take 900 mg by mouth at bedtime. This is in addition to the morning and afternoon doses.   7/21/2025 Bedtime    hydrOXYzine HCl (ATARAX) 25 MG tablet Take 1 tablet (25 mg) by mouth every 6 hours as needed for itching or anxiety (with pain, moderate pain). 30 tablet 0 7/22/2025 Morning    insulin aspart (NOVOLOG PEN) 100 UNIT/ML pen Inject 10 Units subcutaneously 3 times daily (with meals).   7/22/2025 Noon    insulin glargine (LANTUS VIAL) 100 UNIT/ML vial Inject 25 Units subcutaneously every evening.   7/21/2025 Evening    insulin glargine (LANTUS VIAL) 100 UNIT/ML vial Inject 30 Units subcutaneously every morning.   7/22/2025 Morning    metFORMIN (GLUCOPHAGE) 1000 MG tablet Take 1,000 mg by mouth daily (with breakfast).   7/22/2025 Morning    sacubitril-valsartan (ENTRESTO)  MG per tablet Take 1 tablet by mouth daily.   7/22/2025 Morning    semaglutide (OZEMPIC, 1 MG/DOSE,) 2 MG/1.5ML pen Inject 1 mg subcutaneously every 7 days. On Sunday 7/6/2025    senna-docusate (SENOKOT-S/PERICOLACE) 8.6-50 MG tablet Take 1-2 tablets by mouth 2 times daily as needed for constipation. Take while on oral narcotics to prevent or treat constipation. 30 tablet 0 Taking As Needed    spironolactone (ALDACTONE) 25 MG tablet Take 25 mg by mouth daily.   7/22/2025 Morning    torsemide (DEMADEX) 20 MG tablet Take 40 mg by mouth daily.   7/22/2025 Morning    traMADol (ULTRAM) 50 MG tablet Take 50 mg  by mouth 4 times daily as needed for moderate to severe pain.   7/22/2025 Noon    [DISCONTINUED] cephALEXin (KEFLEX) 500 MG capsule Take 1 capsule (500 mg) by mouth 2 times daily. 30 capsule 0 7/22/2025 Morning    [DISCONTINUED] oxyCODONE (ROXICODONE) 5 MG tablet Take 1-2 tablets (5-10 mg) by mouth every 4 hours as needed for moderate to severe pain. 26 tablet 0              Discharge Medications:     Current Discharge Medication List        CONTINUE these medications which have CHANGED    Details   oxyCODONE (ROXICODONE) 5 MG tablet Take 1-2 tablets (5-10 mg) by mouth every 4 hours as needed for moderate to severe pain.  Qty: 15 tablet, Refills: 0    Associated Diagnoses: Closed fracture of neck of left femur, initial encounter (H)           CONTINUE these medications which have NOT CHANGED    Details   acetaminophen (TYLENOL) 500 MG tablet Take 500-1,000 mg by mouth every 6 hours as needed for mild pain.      aspirin (ASA) 325 MG EC tablet Take 1 tablet (325 mg) by mouth daily.  Qty: 30 tablet, Refills: 0    Associated Diagnoses: Closed fracture of neck of left femur, initial encounter (H)      carvedilol (COREG) 25 MG tablet Take 25 mg by mouth daily with food.      DULoxetine (CYMBALTA) 30 MG capsule Take 30 mg by mouth daily.      empagliflozin (JARDIANCE) 25 MG TABS tablet Take 25 mg by mouth every morning      !! gabapentin (NEURONTIN) 300 MG capsule Take 600 mg by mouth 2 times daily. 2 capsules (600 mg) morning and afternoon. This is in addition to the bedtime dose of 900 mg.      !! gabapentin (NEURONTIN) 300 MG capsule Take 900 mg by mouth at bedtime. This is in addition to the morning and afternoon doses.      hydrOXYzine HCl (ATARAX) 25 MG tablet Take 1 tablet (25 mg) by mouth every 6 hours as needed for itching or anxiety (with pain, moderate pain).  Qty: 30 tablet, Refills: 0    Associated Diagnoses: Closed fracture of neck of left femur, initial encounter (H)      insulin aspart (NOVOLOG PEN) 100  "UNIT/ML pen Inject 10 Units subcutaneously 3 times daily (with meals).      !! insulin glargine (LANTUS VIAL) 100 UNIT/ML vial Inject 25 Units subcutaneously every evening.      !! insulin glargine (LANTUS VIAL) 100 UNIT/ML vial Inject 30 Units subcutaneously every morning.      metFORMIN (GLUCOPHAGE) 1000 MG tablet Take 1,000 mg by mouth daily (with breakfast).      sacubitril-valsartan (ENTRESTO)  MG per tablet Take 1 tablet by mouth daily.      semaglutide (OZEMPIC, 1 MG/DOSE,) 2 MG/1.5ML pen Inject 1 mg subcutaneously every 7 days. On Sunday      senna-docusate (SENOKOT-S/PERICOLACE) 8.6-50 MG tablet Take 1-2 tablets by mouth 2 times daily as needed for constipation. Take while on oral narcotics to prevent or treat constipation.  Qty: 30 tablet, Refills: 0    Comments: While taking narcotics  Associated Diagnoses: Closed fracture of neck of left femur, initial encounter (H)      spironolactone (ALDACTONE) 25 MG tablet Take 25 mg by mouth daily.      torsemide (DEMADEX) 20 MG tablet Take 40 mg by mouth daily.      traMADol (ULTRAM) 50 MG tablet Take 50 mg by mouth 4 times daily as needed for moderate to severe pain.       !! - Potential duplicate medications found. Please discuss with provider.        STOP taking these medications       cephALEXin (KEFLEX) 500 MG capsule Comments:   Reason for Stopping:                     Consultations:   No consultations were requested during this admission            Discharge Exam:   Blood pressure 116/44, pulse 63, temperature 97.4  F (36.3  C), temperature source Oral, resp. rate 18, height 1.88 m (6' 2\"), weight 110.6 kg (243 lb 13.3 oz), SpO2 99%.  GENERAL APPEARANCE: healthy, alert and no distress  EYES: conjunctiva clear, eyes grossly normal  HENT: external ears and nose normal   NECK: supple, no masses or adenopathy  RESP: lungs clear to auscultation - no rales, rhonchi or wheezes  CV: regular rate and rhythm, normal S1 S2, no S3 or S4 and no murmur, click or rub "   ABDOMEN: soft, nontender, no HSM or masses and bowel sounds normal  MS: no clubbing, cyanosis; no edema  SKIN: clear without significant rashes or lesions  NEURO: Normal strength and tone, sensory exam grossly normal, mentation intact and speech normal    Unresulted Labs Ordered in the Past 30 Days of this Admission       No orders found for last 31 day(s).            Recent Results (from the past 24 hours)   XR Chest 1 View    Narrative    EXAM: XR CHEST 1 VIEW  LOCATION: [facility]  DATE: 7/22/2025    INDICATION: 75M, hx CHF, persistent mild hypoxia  COMPARISON: Chest radiograph 7/18/2025      Impression    IMPRESSION: Stable size of cardiomediastinal silhouette. Interval decrease in pulmonary vascular congestion and interstitial edema. No milena airspace consolidation, pleural effusion or pneumothorax. Bones are unchanged. Old/healed right rib fractures are   again noted.   CT Lumbar Spine w/o Contrast    Narrative    EXAM: CT LUMBAR SPINE W/O CONTRAST  LOCATION: [facility]  DATE: 7/22/2025    INDICATION: 75M, recent fall with left fem neck fx, here today due to inability to flex LLE off of bed 2 2 weakness  COMPARISON: None.  TECHNIQUE: Routine CT Lumbar Spine without IV contrast. Multiplanar reformats. Dose reduction techniques were used.     FINDINGS:  No evidence of acute fracture or posttraumatic subluxation involving the lumbar spine. Fusion hardware at L5-S1 with solid osseous fusion. Multilevel mild degenerative disc disease and facet arthropathy. Disc bulging contributes to mild to moderate   spinal canal stenoses at L3-4 and L4-5. Mild foraminal stenoses at multiple levels. Scattered vascular calcifications. Distention of the bladder. Left hip prosthesis with streak artifact. Presumed atelectasis at the lung bases. Probable small sliding   hiatal hernia.      Impression    IMPRESSION:  1.  No evidence of acute fracture or posttraumatic  subluxation.   US Lower Extremity Venous Duplex Left    Narrative    EXAM: US LOWER EXTREMITY VENOUS DUPLEX LEFT  LOCATION: [facility]  DATE: 7/22/2025    INDICATION: 75M, recent hip arthroplasty, here with unilateral left leg edema  COMPARISON: None.  TECHNIQUE: Venous Duplex ultrasound of the left lower extremity with and without compression, augmentation and duplex. Color flow and spectral Doppler with waveform analysis performed.    FINDINGS: Exam includes the common femoral, femoral, popliteal, and contralateral common femoral veins as well as segmentally visualized deep calf veins and greater saphenous vein.     LEFT: No deep vein thrombosis. No superficial thrombophlebitis. No popliteal cyst.      Impression    IMPRESSION:  1.  No deep venous thrombosis in the left lower extremity.            Pending Tests at Discharge:   None         Discharge Instructions and Follow-Up:     Discharge diet: Regular   Discharge activity: Activity as tolerated   Discharge follow-up: F/up ortho as scheduled           Discharge Disposition:     Discharged to short-term care facility      Attestation:  I have reviewed today's vital signs, notes, medications, labs and imaging.    Time Spent on this Encounter   I, Jude Keenan MD, personally saw the patient today and spent greater than 30 minutes discharging this patient.    Jude Keenan MD

## 2025-07-23 NOTE — PROGRESS NOTES
[facility] Authorization required for OT evaluation:   07/23/25 0700   [facility] Authorization Information   Condition type Initial onset (within last 3 months)   Cause of current episode Post Surgical   Type of surgery 5-Joint Replacement   Nature of treatment Rehabilitative   Functional ability Moderate functional limitations   Documented POC (choose all that apply) Measurable short and long term/discharge treatment goals related to physical and functional deficits.   Briefly describe symptoms pain in hip after fall requiring L THR   How did the symptoms start from fall   Last 24H: avg pain/symptom intensity  7/10   Past wk: avg pain/symptom intensity 7/10   Frequency of Symptoms Constantly (% of the time)   Symptom impact on ADLs Moderately   Condition change since eval Worse   General health reported by patient Fair

## 2025-07-23 NOTE — PROGRESS NOTES
[facility]                                                                                   OUTPATIENT OCCUPATIONAL THERAPY    PLAN OF TREATMENT FOR OUTPATIENT REHABILITATION   Patient's Last Name, First Name, Reilly James YOB: 1950   Provider's Name   [facility]   Medical Record No.  [id number]     Onset Date: 07/22/25 Start of Care Date: 07/23/25     Medical Diagnosis:  weakness               OT Diagnosis:  decreased ability to perform self care skills Certification Dates:  From: 07/23/25  To: 07/30/25     See note for plan of treatment, functional goals, and certification details.    I CERTIFY THE NEED FOR THESE SERVICES FURNISHED UNDER        THIS PLAN OF TREATMENT AND WHILE UNDER MY CARE (Physician co-signature of this document indicates review and certification of the therapy plan).                              07/23/25 0700   Appointment Info   Signing Clinician's Name / Credentials (OT) Odilia Dean OTR/L   Quick Adds   Quick Adds Certification   Living Environment   People in Home child(mihaela), adult;spouse   Current Living Arrangements house   Home Accessibility stairs to enter home;stairs within home   Number of Stairs, Main Entrance 3   Stair Railings, Main Entrance railings safe and in good condition   Number of Stairs, Within Home, Primary greater than 10 stairs   Stair Railings, Within Home, Primary railings safe and in good condition   Transportation Anticipated family or friend will provide   Living Environment Comments Pt returned home from hospital on weekend unable to care for self , slept in recliner on first floor   Self-Care   Usual Activity Tolerance moderate   Current Activity Tolerance fair   Equipment Currently Used at Home shower chair;crutches   Fall history within last six months yes   Number of times patient has fallen within last six months 1   Activity/Exercise/Self-Care Comment Pt was ind with  BADLs prior to hip replacement   Instrumental Activities of Daily Living (IADL)   Previous Responsibilities laundry;medication management;finances;driving   General Information   Onset of Illness/Injury or Date of Surgery 07/22/25   Referring Physician Loretta Lamb PA-C   Patient/Family Therapy Goal Statement (OT) D/c home   Additional Occupational Profile Info/Pertinent History of Current Problem Reilly Borja is a 75 year old male with past medical history of hypertension, charcot joint of left foot, nonalcoholic fatty liver disease, T2DM with insulin use, HFpEF, recent hip replacement now presents on 7/22/2025 with managing poorly at home following hip replacement. He is being admitted for PT eval & disposition planning   Cognitive Status Examination   Orientation Status orientation to person, place and time   Affect/Mental Status (Cognitive) confused   Cognitive Status Comments some confusion noted with pt forgetting had donned pull ups and needed to pull up, and goes back and forth of activity level last few days at home   Pain Assessment   Patient Currently in Pain Yes, see Vital Sign flowsheet   Range of Motion Comprehensive   General Range of Motion no range of motion deficits identified   Strength Comprehensive (MMT)   General Manual Muscle Testing (MMT) Assessment no strength deficits identified   Coordination   Upper Extremity Coordination No deficits were identified   Bed Mobility   Bed Mobility supine-sit   Supine-Sit Cotter (Bed Mobility) minimum assist (75% patient effort)   Assistive Device (Bed Mobility) other (see comments)  (used gait belt)   Comment (Bed Mobility) Pt shown tech to use gait belt for movement of L LE with pt c/o not being able to lift on own. TH assisted min.   Transfers   Transfers sit-stand transfer;toilet transfer;bed-chair transfer   Transfer Skill: Bed to Chair/Chair to Bed   Bed-Chair Cotter (Transfers) supervision;nonverbal cues (demo/gesture)   Sit-Stand  Transfer   Sit-Stand Colbert (Transfers) contact guard   Toilet Transfer   Type (Toilet Transfer) stand pivot/stand step   Colbert Level (Toilet Transfer) contact guard;supervision   Toilet Transfer Comments needed assist for safety to line self to toilet and use safety bars   Activities of Daily Living   BADL Assessment/Intervention lower body dressing;toileting   Lower Body Dressing Assessment/Training   Colbert Level (Lower Body Dressing) don;pants/bottoms;minimum assist (75% patient effort)   Assistive Devices (Lower Body Dressing) reacher   Position (Lower Body Dressing) unsupported sitting   Comment, (Lower Body Dressing) Pt demoed using reacher with min A to don pull ups, with cues to pull up forgetting had donned   Toileting   Colbert Level (Toileting) supervision;set up   Clinical Impression   Criteria for Skilled Therapeutic Interventions Met (OT) Yes, treatment indicated   OT Diagnosis decreased ability to perform self care skills   Influenced by the following impairments L foot past surg   OT Problem List-Impairments impacting ADL problems related to;activity tolerance impaired;cognition;mobility;pain   Assessment of Occupational Performance 1-3 Performance Deficits   Identified Performance Deficits LB dressing, transfers   Planned Therapy Interventions (OT) ADL retraining;cognition;strengthening;transfer training;progressive activity/exercise   Clinical Decision Making Complexity (OT) problem focused assessment/low complexity   Risk & Benefits of therapy have been explained evaluation/treatment results reviewed;care plan/treatment goals reviewed;risks/benefits reviewed;current/potential barriers reviewed;participants voiced agreement with care plan;participants included;patient   Clinical Impression Comments Pt appears to be a good skilled OT canidate for increased safety and ind with BADLs , functional transfers and mobility at walker level   OT Total Evaluation Time   OT Eval, Low  Complexity Minutes (31353) 9   Therapy Certification   Start of Care Date 07/23/25   Certification date from 07/23/25   Certification date to 07/30/25   Medical Diagnosis weakness   OT Goals   Therapy Frequency (OT) 5 times/week   OT Predicted Duration/Target Date for Goal Attainment 07/30/25   OT Goals Hygiene/Grooming;Lower Body Dressing;Toilet Transfer/Toileting   OT: Hygiene/Grooming supervision/stand-by assist;while standing   OT: Lower Body Dressing Supervision/stand-by assist;using adaptive equipment   OT: Toilet Transfer/Toileting Supervision/stand-by assist;toilet transfer;cleaning and garment management;using adaptive equipment   Interventions   Interventions Quick Adds Self-Care/Home Management   Self-Care/Home Management   Self-Care/Home Mgmt/ADL, Compensatory, Meal Prep Minutes (28831) 14   Symptoms Noted During/After Treatment (Meal Preparation/Planning Training) fatigue   Treatment Detail/Skilled Intervention Pt required min A supine to sitting EOB with AE trial use of reacher. Pt c/o initally of not being able to move in bed. SOme confusion noted during assessment. Pt required CGA to stand from bed and for toilet transfer to line self up at toilet for safety cues. min A to don briefs with reacher .   OT Discharge Planning   OT Plan POC 1/5 Wed LB dressing, toileting, stand rebecca g/h   OT Discharge Recommendation (DC Rec) Transitional Care Facility   OT Rationale for DC Rec Recommend TCU with pt unable to care for self at home below baseline returning to hospital.   OT Brief overview of current status CGA toileting, min A LB dressing with AE, min A bed mobility   OT Total Distance Amb During Session (feet) 10   Total Session Time   Timed Code Treatment Minutes 14   Total Session Time (sum of timed and untimed services) 23

## 2025-07-23 NOTE — CONSULTS
Care Management Initial Consult    General Information  Assessment completed with: VM-chart review, Patient, Spouse or significant other,         Primary Care Provider verified and updated as needed:     Readmission within the last 30 days: previous discharge plan unsuccessful      Reason for Consult: discharge planning  Advance Care Planning: Advance Care Planning Reviewed: no concerns identified          Communication Assessment  Patient's communication style: spoken language (English or Bilingual)    Hearing Difficulty or Deaf: no   Wear Glasses or Blind: yes    Cognitive  Cognitive/Neuro/Behavioral: WDL                      Living Environment:   People in home: significant other     Current living Arrangements: house      Able to return to prior arrangements: other (see comments) (Undetermined at this time)       Family/Social Support:  Care provided by: friend  Provides care for: no one     Support system:            Description of Support System:           Current Resources:   Patient receiving home care services: No        Community Resources: None  Equipment currently used at home: shower chair, crutches (was issued [facility] last admit)  Supplies currently used at home: Wound Care Supplies    Employment/Financial:  Employment Status: retired        Financial Concerns: none           Does the patient's insurance plan have a 3 day qualifying hospital stay waiver?  Yes     Which insurance plan 3 day waiver is available? Alternative insurance waiver    Will the waiver be used for post-acute placement? Yes    Lifestyle & Psychosocial Needs:  Social Drivers of Health     Food Insecurity: Low Risk  (7/22/2025)    Food Insecurity     Within the past 12 months, did you worry that your food would run out before you got money to buy more?: No     Within the past 12 months, did the food you bought just not last and you didn t have money to get more?: No   Depression: Not at risk (1/12/2024)    PHQ-2     PHQ-2 Score: 2    Housing Stability: Low Risk  (7/22/2025)    Housing Stability     Do you have housing? : Yes     Are you worried about losing your housing?: No   Tobacco Use: Low Risk  (7/21/2025)    Patient History     Smoking Tobacco Use: Never     Smokeless Tobacco Use: Never     Passive Exposure: Never   Financial Resource Strain: Low Risk  (7/22/2025)    Financial Resource Strain     Within the past 12 months, have you or your family members you live with been unable to get utilities (heat, electricity) when it was really needed?: No   Alcohol Use: Not on file   Transportation Needs: Low Risk  (7/22/2025)    Transportation Needs     Within the past 12 months, has lack of transportation kept you from medical appointments, getting your medicines, non-medical meetings or appointments, work, or from getting things that you need?: No   Physical Activity: Not on file   Interpersonal Safety: Low Risk  (7/22/2025)    Interpersonal Safety     Do you feel physically and emotionally safe where you currently live?: Yes     Within the past 12 months, have you been hit, slapped, kicked or otherwise physically hurt by someone?: No     Within the past 12 months, have you been humiliated or emotionally abused in other ways by your partner or ex-partner?: No   Stress: Not on file   Social Connections: Not on file   Health Literacy: Not on file       Functional Status:  Prior to admission patient needed assistance:   Dependent ADLs:: Independent  Dependent IADLs:: Cleaning, Cooking, Laundry, Shopping, Meal Preparation       Mental Health Status:          Chemical Dependency Status:                Values/Beliefs:  Spiritual, Cultural Beliefs, Caodaism Practices, Values that affect care: no               Discussed  Partnership in Safe Discharge Planning  document with patient/family: Yes: patient      Care Management Discharge Note    Discharge Date: 07/23/2025       Discharge Disposition: Transitional Care    Discharge Services:  None    Discharge DME: None    Discharge Transportation: family or friend will provide    Private pay costs discussed: Not applicable    Does the patient's insurance plan have a 3 day qualifying hospital stay waiver?  Yes     Which insurance plan 3 day waiver is available? Alternative insurance waiver    Will the waiver be used for post-acute placement? Yes    PAS Confirmation Code:    Patient/family educated on Medicare website which has current facility and service quality ratings: yes    Education Provided on the Discharge Plan: Yes  Persons Notified of Discharge Plans: patient, s/o [place], jamir Gomes  Patient/Family in Agreement with the Plan: yes      Additional Information:  Per MD at IDT rounds patient IS medically stable for discharge today. CM consulted for discharge planning and elevated TAWANA score. Patient is hospital readmission, discharged from [facility] on 7/[facility] with homecare services and was unable to manage at home.    Per therapy recommending TCU.    CM met bedside with patient and s/o [place] for discussion of discharge plan. Patient/family in agreement with TCU and requesting referrals sent to [facility] as first choice and [facility]. CM discussed pts insurance and waiving 3 inpatient stay needed, patient understanding of this as patient is currently OBS status. Patient accepted at both [facility] and [facility] TCU.    CM met bedside with patient to discuss, patient electing [facility] TCU who can take him after 2pm today. S/o to transport.    Bedside RN updated and MD paged for orders.    Plan: [facility] (Phone: [phone]/ Fax: [phone])    Transport: s/o [place] at 2pm    Terese Handley RNCM  Care Transitions Registered Nurse  Tele: [phone]

## 2025-07-23 NOTE — PLAN OF CARE
Occupational Therapy Discharge Summary    Reason for therapy discharge:    Discharged to transitional care facility.    Progress towards therapy goal(s). See goals on Care Plan in [facility] electronic health record for goal details.  Goals not met.  Barriers to achieving goals:   discharge from facility.    Therapy recommendation(s):    Continued therapy is recommended.  Rationale/Recommendations:  continued skilled OT required at TCU for increased ind and safety with transfers and BADLs.

## 2025-07-23 NOTE — PROGRESS NOTES
Care Management Note:    PAS-RR    Per DHS regulation, CTS team completed and submitted PAS-RR to [place] [employer] Direct Connect via the Senior Link[employer] Line. CTS team advised SNF and they are aware a PAS-RR has been submitted.       PAS-RR # [id number]    RICARDO Younger  [facility]   [phone]

## 2025-07-23 NOTE — PLAN OF CARE
"Goal Outcome Evaluation:      Plan of Care Reviewed With: patient    Overall Patient Progress: no changeOverall Patient Progress: no change    Outcome Evaluation: Pt A&O x4. Remained in bed this shift. LLE - hip&ankle pain. Has not been out of bed yet. Pt has difficulty lifting, repositioning the LLE. Elevated on a pillow & ice applied. Utilizing urinal at bedside. Pain has been manageable with PRN tylenol & tramadol. Able to sleep throughout the night.      Activity:  Has not been OOB yet due to LLE pain & decreased mobility.     Neuro: A&O x4        Resp: Lung sounds clear.     GI/: Utilizing bedside urinal.      Skin: Surgical dressing to left hip intact. LLE edema.     Diet: Moderate carb. Diabetic diet.     IV Access/Drains: NO PIV.     Vitals: /64 (BP Location: Left arm)   Pulse 64   Temp 97.5  F (36.4  C) (Oral)   Resp 17   Ht 1.88 m (6' 2\")   Wt 110.6 kg (243 lb 13.3 oz)   SpO2 94%   BMI 31.31 kg/m      Pain:  Pain reported in left hip & left ankle. PRN tramadol given - pt claims effective.     Plan: PT/OT & surgery consult             "

## 2025-07-23 NOTE — UTILIZATION REVIEW
Concurrent stay review; Secondary Review Determination - [facility]        Under the authority of the Utilization Management Committee, the utilization review process indicated a secondary review on the above patient.  The review outcome is based on review of the medical records, discussions with staff, and applying clinical experience noted on the date of the review.        (x) Outpatient intermediate status is appropriate       RATIONALE FOR DETERMINATION:       75 year old male with past medical history of hypertension, charcot joint of left foot, nonalcoholic fatty liver disease, T2DM with insulin use, HFpEF, recent hip replacement now presents on 7/22/2025 with managing poorly at home following hip replacement. He is being admitted for PT eval & disposition planning. Ortho evaluated him and no new issues after surgery. Awaiting placement. No major pain needs or other active complaints.     Patient delayed discharge is related to disposition, there is no medical necessity for inpatient admission at the time of this review. If there is a change in patient status, please resend for review.    The information on this document is developed by the utilization review team in order for the business office to ensure compliance.  This only denotes the appropriateness of proper admission status and does not reflect the quality of care rendered.       The definitions of Inpatient Status and Observation Status used in making the determination above are those provided in the CMS Coverage Manual, Chapter 1 and Chapter 6, section 70.4.       Sincerely,    Johnny Wu, DO

## 2025-07-23 NOTE — PROGRESS NOTES
[facility] Medicine Progress Note  Date of Service (when I saw the patient): 07/23/2025    REASON FOR ADMISSION / INTERVAL HISTORY:  Reilly Borja is a 75 year old male with past medical history of hypertension, charcot joint of left foot, nonalcoholic fatty liver disease, T2DM with insulin use, HFpEF, recent hip replacement now presents on 7/22/2025 with managing poorly at home following hip replacement. He is being admitted for PT eval & disposition planning.       Assessment/ Plan     Left leg weakness    Presents with profound left leg weakness in setting of left SHADI after a fall 7/18. Was non-weightbearing on leg for 2 months prior to SHADI 2/2 charcot foot (was using crutches). Seen by OT post-op day 1, who recommended PT eval. No PT note completed, but per discharge summary patient was seen & did ok with PT. Was discharged home, & has not been able to get out of bed since discharge. Unable to lift leg up at all. No numbness.     CT lumbar spine w/out contrast negative.  Venous duplex US of left leg negative for DVT.     Most likely left leg weakness due to chronic deconditioning from offloading 2/2 Charcot foot (below) exacerbated by recent hip arthroplasty.   PT seen-recommend TCU.      Fall, causing left femoral neck fracture  Now S/p Left SHADI 7/18/25    Mechanical fall 7/18 causing hip fracture, now s/p arthroplasty. Discharged home with aspirin, tramadol for pain control. Feels weakness in thigh since surgery; can't lift his leg up.    Orthopedic surgery consulted-to see pt yet  - Continue PTA aspirin 325mg daily for VTE prophylaxis per ortho  - Continue PTA tramadol for pain control     Charcot's joint of left foot    [facility] multiple [facility], [facility] Had been using crutches to ambulate since his last surgery.   - PT, OT, pain management as per hip arthroplasty, above     (HFpEF) heart failure with  "preserved ejection fraction    ECHO 2023 LVEF 50-55%, no wall motion abnormalities. On GDMT for HFrEF so unclear if she had subsequent reduced EF that we cannot see?     CXR 7/22 shows some increased pulmonary vascular congestion. Clinically appears grossly euvolemic. Doubt HF exacerbation.   - Continue PTA carvedilol 25mg daily   - Continue PTA spironolactone 25mg daily    - Continue PTA torsemide 40mg daily      Essential hypertension    Normotensive on admission.   - Continue PTA Entresto as per diabetes, below     Type 2 diabetes mellitus     Hemoglobin A1c 7.8% on 7/18/2025.     Glucose 302 on presentation.      - Continue PTA insulin aspart 10u TID with meals  - Continue PTA insulin glargine 30u in AM, 25u in PM  - Continue PTA empagliflozin 25mg daily   - Continue PTA metformin 100mg daily with breakfast  - Continue PTA sacubitril-valsartan 97-103mg daily   - Medium sliding scale correction insulin   - Hypoglycemia protocol in place   - Continue PTA gabapentin 600mg BID & 900mg at bedtime     Iron deficiency anemia    Baseline hemoglobin  11.5 - 12.5. Hgb on presentation 10.8.   - Hemoglobin in AM      Hyperlipidemia    Noted in history; not on any outpatient pharmacologic management.      Anxiety  - Continue PTA duloxetine 30mg daily      Nonalcoholic fatty liver disease    Noted in history. Recommend ongoing PCP follow up for surveillance & lifestyle interventions.        Diet: Moderate Consistent Carb (60 g CHO per Meal) Diet    DVT Prophylaxis: Ambulate every shift  Freeman Catheter: Not present  Code Status: Full Code      Medically Ready for Discharge: Anticipated Today        KOKI BENNETT MD   Pg [phone]        ROS:  As described in A/P and Exam.  Otherwise ALL are  negative.    PHYSICAL EXAM:  All vitals have been reviewed    Blood pressure 127/59, pulse 74, temperature 97.5  F (36.4  C), temperature source Oral, resp. rate 18, height 1.88 m (6' 2\"), weight 110.6 kg (243 lb 13.3 oz), SpO2 96%.    No " intake/output data recorded.    GENERAL APPEARANCE: healthy, alert and no distress  EYES: conjunctiva clear, eyes grossly normal  HENT: external ears and nose normal   RESP: lungs clear to auscultation - no rales, rhonchi or wheezes  CV: regular rate and rhythm, normal S1 S2, no S3 or S4 and no murmur, click or rub   ABDOMEN: soft, nontender, no HSM or masses and bowel sounds normal  MS: no clubbing, cyanosis; L foot enlarged ankle jnt  SKIN: clear without significant rashes or lesions  NEURO: -non-focal moves all 4 extr    ROUTINE  LABS (Last four results)  CMP  Recent Labs   Lab 07/23/25  0837 07/23/25  0143 07/22/25  2152 07/22/25  1952 07/22/25  1628 07/20/25  0821 07/20/25  0646 07/19/25  0806 07/19/25  0643 07/18/25  1126 07/18/25  0356   NA  --   --   --   --  136  --  135  --  134*  --  135   POTASSIUM  --   --   --   --  4.2  --  3.9  --  4.2  --  3.7   CHLORIDE  --   --   --   --  96*  --  100  --  97*  --  96*   CO2  --   --   --   --  26  --  25  --  24  --  25   ANIONGAP  --   --   --   --  14  --  10  --  13  --  14   * 105* 187* 190* 302*   < > 122*   < > 242*   < > 102*   BUN  --   --   --   --  27.1*  --  29.5*  --  29.8*  --  18.6   CR  --   --   --   --  0.92  --  0.87  --  1.11  --  1.09   GFRESTIMATED  --   --   --   --  87  --  >90  --  70  --  71   ISSA  --   --   --   --  8.5*  --  8.0*  --  8.3*  --  8.5*   PROTTOTAL  --   --   --   --   --   --   --   --   --   --  7.7   ALBUMIN  --   --   --   --   --   --   --   --   --   --  3.7   BILITOTAL  --   --   --   --   --   --   --   --   --   --  0.5   ALKPHOS  --   --   --   --   --   --   --   --   --   --  229*   AST  --   --   --   --   --   --   --   --   --   --  34   ALT  --   --   --   --   --   --   --   --   --   --  16    < > = values in this interval not displayed.     CBC  Recent Labs   Lab 07/23/25  0631 07/22/25  1628 07/20/25  0646 07/19/25  0643 07/18/25  0356   WBC  --  5.9 7.5 12.5* 8.6   RBC  --  3.84* 3.36* 4.04* 4.53    HGB 10.9* 10.8* 9.5* 11.4* 12.8*   HCT  --  34.6* 30.2* 36.1* 41.0   MCV 91 90 90 89 91   MCH  --  28.1 28.3 28.2 28.3   MCHC  --  31.2* 31.5 31.6 31.2*   RDW  --  15.7* 15.7* 15.4* 15.4*   PLT  --  247 193 283 291     INR  Recent Labs   Lab 07/18/25  0356   INR 1.01     Arterial Blood GasNo lab results found in last 7 days.    Recent Results (from the past 24 hours)   XR Chest 1 View    Narrative    EXAM: XR CHEST 1 VIEW  LOCATION: [facility]  DATE: 7/22/2025    INDICATION: 75M, hx CHF, persistent mild hypoxia  COMPARISON: Chest radiograph 7/18/2025      Impression    IMPRESSION: Stable size of cardiomediastinal silhouette. Interval decrease in pulmonary vascular congestion and interstitial edema. No milena airspace consolidation, pleural effusion or pneumothorax. Bones are unchanged. Old/healed right rib fractures are   again noted.   CT Lumbar Spine w/o Contrast    Narrative    EXAM: CT LUMBAR SPINE W/O CONTRAST  LOCATION: [facility]  DATE: 7/22/2025    INDICATION: 75M, recent fall with left fem neck fx, here today due to inability to flex LLE off of bed 2 2 weakness  COMPARISON: None.  TECHNIQUE: Routine CT Lumbar Spine without IV contrast. Multiplanar reformats. Dose reduction techniques were used.     FINDINGS:  No evidence of acute fracture or posttraumatic subluxation involving the lumbar spine. Fusion hardware at L5-S1 with solid osseous fusion. Multilevel mild degenerative disc disease and facet arthropathy. Disc bulging contributes to mild to moderate   spinal canal stenoses at L3-4 and L4-5. Mild foraminal stenoses at multiple levels. Scattered vascular calcifications. Distention of the bladder. Left hip prosthesis with streak artifact. Presumed atelectasis at the lung bases. Probable small sliding   hiatal hernia.      Impression    IMPRESSION:  1.  No evidence of acute fracture or posttraumatic subluxation.   US Lower Extremity Venous Duplex Left     Narrative    EXAM: US LOWER EXTREMITY VENOUS DUPLEX LEFT  LOCATION: [facility]  DATE: 7/22/2025    INDICATION: 75M, recent hip arthroplasty, here with unilateral left leg edema  COMPARISON: None.  TECHNIQUE: Venous Duplex ultrasound of the left lower extremity with and without compression, augmentation and duplex. Color flow and spectral Doppler with waveform analysis performed.    FINDINGS: Exam includes the common femoral, femoral, popliteal, and contralateral common femoral veins as well as segmentally visualized deep calf veins and greater saphenous vein.     LEFT: No deep vein thrombosis. No superficial thrombophlebitis. No popliteal cyst.      Impression    IMPRESSION:  1.  No deep venous thrombosis in the left lower extremity.

## 2025-07-24 ENCOUNTER — LAB REQUISITION (OUTPATIENT)
Dept: LAB | Facility: CLINIC | Age: 75
End: 2025-07-24

## 2025-07-24 ENCOUNTER — TRANSITIONAL CARE UNIT VISIT (OUTPATIENT)
Dept: GERIATRICS | Facility: CLINIC | Age: 75
End: 2025-07-24
Payer: COMMERCIAL

## 2025-07-24 VITALS
WEIGHT: 244.8 LBS | TEMPERATURE: 97.2 F | DIASTOLIC BLOOD PRESSURE: 76 MMHG | RESPIRATION RATE: 18 BRPM | BODY MASS INDEX: 31.42 KG/M2 | OXYGEN SATURATION: 92 % | HEART RATE: 72 BPM | HEIGHT: 74 IN | SYSTOLIC BLOOD PRESSURE: 143 MMHG

## 2025-07-24 DIAGNOSIS — R76.12 NONSPECIFIC REACTION TO CELL MEDIATED IMMUNITY MEASUREMENT OF GAMMA INTERFERON ANTIGEN RESPONSE WITHOUT ACTIVE TUBERCULOSIS: ICD-10-CM

## 2025-07-24 DIAGNOSIS — I10 ESSENTIAL HYPERTENSION: ICD-10-CM

## 2025-07-24 DIAGNOSIS — Z98.890 S/P SPINAL SURGERY: ICD-10-CM

## 2025-07-24 DIAGNOSIS — K76.0 NONALCOHOLIC FATTY LIVER DISEASE: ICD-10-CM

## 2025-07-24 DIAGNOSIS — M14.672 CHARCOT'S JOINT OF LEFT FOOT: ICD-10-CM

## 2025-07-24 DIAGNOSIS — M54.16 LUMBAR RADICULOPATHY: ICD-10-CM

## 2025-07-24 DIAGNOSIS — I50.32 CHRONIC HEART FAILURE WITH PRESERVED EJECTION FRACTION (H): ICD-10-CM

## 2025-07-24 DIAGNOSIS — R29.898 LEFT LEG WEAKNESS: ICD-10-CM

## 2025-07-24 DIAGNOSIS — S72.002D CLOSED FRACTURE OF NECK OF LEFT FEMUR WITH ROUTINE HEALING, SUBSEQUENT ENCOUNTER: Primary | ICD-10-CM

## 2025-07-24 DIAGNOSIS — E11.8 TYPE 2 DIABETES MELLITUS WITH COMPLICATION, WITH LONG-TERM CURRENT USE OF INSULIN (H): ICD-10-CM

## 2025-07-24 DIAGNOSIS — R41.89 COGNITIVE IMPAIRMENT: ICD-10-CM

## 2025-07-24 DIAGNOSIS — R53.81 PHYSICAL DECONDITIONING: ICD-10-CM

## 2025-07-24 DIAGNOSIS — Z79.4 TYPE 2 DIABETES MELLITUS WITH COMPLICATION, WITH LONG-TERM CURRENT USE OF INSULIN (H): ICD-10-CM

## 2025-07-24 DIAGNOSIS — D50.9 IRON DEFICIENCY ANEMIA, UNSPECIFIED IRON DEFICIENCY ANEMIA TYPE: ICD-10-CM

## 2025-07-24 DIAGNOSIS — Z96.642 STATUS POST TOTAL REPLACEMENT OF LEFT HIP: ICD-10-CM

## 2025-07-24 RX ORDER — OXYCODONE HYDROCHLORIDE 5 MG/1
5-10 TABLET ORAL EVERY 4 HOURS PRN
Qty: 40 TABLET | Refills: 0 | Status: SHIPPED | OUTPATIENT
Start: 2025-07-24

## 2025-07-24 NOTE — LETTER
7/24/2025      Reilly Borja  [address] [address]        [facility] GERIATRICS  INITIAL VISIT NOTE      PRIMARY CARE PROVIDER AND CLINIC: [facility] [address] / [facility] [address]    [facility] Medical Record Number: [id number]  Place of Service where encounter took place[facility] ([facility]) [[id number]]    Chief Complaint   Patient presents with     Hospital F/U     [facility] 7/22/2025 - 7/23/2025     HPI:    Reilly Borja is a 75 year old (1950) male was admitted to the above facility from [facility]. Hospital stay 7/22 through 7/23/25 where they were admitted for left leg weakness. Now admitted to this facility for rehab, medical management, and nursing care.      History obtained from: facility chart records, facility staff, patient report, and [facility] chart review.      Brief Hospital Course: PMH of HTN, charcot joint of left foot, nonalcoholic fatty liver disease, DM2, CHF, left SHADI after fall with hip fracture on 7/18 who presented with left leg weakness. Has not been able to manage at home after hip surgery, unable to get out OOB. Ortho consulted. CT of lumbar spine negative, no DVT. Weakness felt likely due to deconditioning.  Other chronic stable. TCU recommended. When medically stable was discharged to TCU for further rehab and medical management.     TCU Course: Seen this AM sitting up in recliner. He is very tired, did not sleep well last night. Is having some pain in his foot, does feel pain medication helps with this. Feels like left leg is still weak. Denies any chest pain or SOB. Is having bowel movements.Appetite ok. Did doze off during visit.    CODE STATUS/ADVANCE DIRECTIVES: CPR/Full code     ALLERGIES:  No Known Allergies    PAST MEDICAL HISTORY:   Past Medical History:   Diagnosis Date     Congestive heart failure (H)      Diabetes (H)       Hypertension      Iron deficiency anemia secondary to inadequate dietary iron intake      PAST SURGICAL HISTORY:   Past Surgical History:   Procedure Laterality Date     ARTHRODESIS FOOT Left 9/13/2023    Procedure: Left Talonavicular and Calcanecuboid Joint Fusion;  Surgeon: Orlando Duarte MD;  Location: [facility] OR     ARTHROPLASTY HIP Left 7/18/2025    Procedure: ARTHROPLASTY, LEFT HIP, TOTAL;  Surgeon: Mich Vasquez MD;  Location: [facility] OR     ARTHROSCOPY KNEE Left      IRRIGATION AND DEBRIDEMENT LOWER EXTREMITY, COMBINED Left 9/17/2021    Procedure: IRRIGATION AND DEBRIDEMENT  WITH BONE BIOPSY LEFT FOOT;  Surgeon: Julius Campbell DO;  Location: [facility] OR     OTHER SURGICAL HISTORY Left     surgery for charcot of left foot     REMOVE HARDWARE LOWER EXTREMITY Left 9/17/2021    Procedure: REMOVAL OF HARDWARE LEFT FOOT DEEP;  Surgeon: Julius Campbell DO;  Location: [facility] LUMBAR SPINE FUSN,POST INTRBDY Right 02/06/2017    Procedure: RIGHT L5-S1 TRANSFORAMINAL LUMBAR INTERBODY FUSION;  Surgeon: Rajiv Zavala MD;  Location: [facility];  Service: Spine     FAMILY HISTORY:   Family History   Problem Relation Age of Onset     Venous thrombosis Mother      Diabetes Mother      Anesthesia Reaction No family hx of          SOCIAL HISTORY:   Patient's living condition: lives with significant other    MEDICATIONS  Post Discharge Medication Reconciliation Status: discharge medications reconciled and changed, per note/orders.  Current Outpatient Medications   Medication Sig Dispense Refill     oxyCODONE (ROXICODONE) 5 MG tablet Take 1-2 tablets (5-10 mg) by mouth every 4 hours as needed for moderate to severe pain. 40 tablet 0     acetaminophen (TYLENOL) 500 MG tablet Take 500-1,000 mg by mouth every 6 hours as needed for mild pain.       aspirin (ASA) 325 MG EC tablet Take 1 tablet (325 mg) by mouth daily. 30 tablet 0     carvedilol (COREG) 25 MG tablet  "Take 25 mg by mouth daily with food.       DULoxetine (CYMBALTA) 30 MG capsule Take 30 mg by mouth daily.       empagliflozin (JARDIANCE) 25 MG TABS tablet Take 25 mg by mouth every morning       gabapentin (NEURONTIN) 300 MG capsule Take 600 mg by mouth 2 times daily. 2 capsules (600 mg) morning and afternoon. This is in addition to the bedtime dose of 900 mg.       gabapentin (NEURONTIN) 300 MG capsule Take 900 mg by mouth at bedtime. This is in addition to the morning and afternoon doses.       hydrOXYzine HCl (ATARAX) 25 MG tablet Take 1 tablet (25 mg) by mouth every 6 hours as needed for itching or anxiety (with pain, moderate pain). 30 tablet 0     insulin aspart (NOVOLOG PEN) 100 UNIT/ML pen Inject 10 Units subcutaneously 3 times daily (with meals).       insulin glargine (LANTUS VIAL) 100 UNIT/ML vial Inject 25 Units subcutaneously every evening.       insulin glargine (LANTUS VIAL) 100 UNIT/ML vial Inject 30 Units subcutaneously every morning.       metFORMIN (GLUCOPHAGE) 1000 MG tablet Take 1,000 mg by mouth daily (with breakfast).       sacubitril-valsartan (ENTRESTO)  MG per tablet Take 1 tablet by mouth daily.       semaglutide (OZEMPIC, 1 MG/DOSE,) 2 MG/1.5ML pen Inject 1 mg subcutaneously every 7 days. On Sunday       senna-docusate (SENOKOT-S/PERICOLACE) 8.6-50 MG tablet Take 1-2 tablets by mouth 2 times daily as needed for constipation. Take while on oral narcotics to prevent or treat constipation. 30 tablet 0     spironolactone (ALDACTONE) 25 MG tablet Take 25 mg by mouth daily.       torsemide (DEMADEX) 20 MG tablet Take 40 mg by mouth daily.       traMADol (ULTRAM) 50 MG tablet Take 1 tablet (50 mg) by mouth 4 times daily as needed for moderate to severe pain. 30 tablet 1     ROS:  10 point ROS neg other than the symptoms noted above in the HPI.      PHYSICAL EXAM:  BP (!) 143/76   Pulse 72   Temp 97.2  F (36.2  C)   Resp 18   Ht 1.88 m (6' 2\")   Wt 111 kg (244 lb 12.8 oz)   SpO2 92%  "  BMI 31.43 kg/m    Physical Exam  Cardiovascular:      Rate and Rhythm: Normal rate and regular rhythm.      Heart sounds: Normal heart sounds.   Pulmonary:      Effort: Pulmonary effort is normal.      Breath sounds: Normal breath sounds.   Abdominal:      General: Bowel sounds are normal.      Palpations: Abdomen is soft.   Musculoskeletal:      Left lower leg: Edema present.      Comments: Decreased ROM to left hip   Skin:     Comments: Ace wrap to LLE CDI   Neurological:      General: No focal deficit present.      Mental Status: He is alert.   Psychiatric:         Mood and Affect: Mood normal.        LABORATORY/IMAGING DATA:  Reviewed as per [facility] and/or [facility]    ASSESSMENT/PLAN:  Closed fracture of neck of left femur with routine healing, subsequent encounter  Status post total replacement of left hip  Left leg weakness  Physical deconditioning  Secondary to fall, Pain appears controlled, mobility limited.  Discussed with patient, nursing staff.   - PT/OT  - discontinue tramadol to avoid confusion/reduce risk of over medication  - continue oxycodone 5-10mg PRN, APAP PRN, hydroxyzine PRN  - TTWB to LLE due to charcot foot  - DVT ppx with ASA  - follow-up with ortho     Chronic heart failure with preserved ejection fraction (H)  Essential hypertension  Appears compensated. Echo in 2023 with EF of 50-55%, some vascular congestion noted on CXR while IP  - continue Coreg, spironolactone, torsemide  - daily weights  - monitor and adjust     Type 2 diabetes mellitus with complication, with long-term current use of insulin (H)  A1C 7.8%, BG  since TCU admission, but limited data to trend  - continue Jardiance, Aspart 10 units TID with meals, metformin with breakfast, glargine 30 units q AM, 25 units at bedtime, Ozempic weekly   - monitor and adjsut     Lumbar radiculopathy  S/P spinal surgery  Chronic pain, appears controlled  - gabapentin BID  - other analgesia as above     Iron deficiency anemia,  unspecified iron deficiency anemia type  Hgb 12.8->9.5->10.9 post-op  - check Hgb PRN    Charcot's joint of left foot  [facility], discussed with [facility]  - heel touch weight bearing  - CAM boot hen ambulation     Nonalcoholic fatty liver disease  No concerns currently  - monitor , check LFT PRN    Cognitive impairment  SLUMS 17/30, which indicates dementia.   - OT to complete CPT  - supportive cares           Orders:   Discontinue tramadol      Total time spent with patient visit at the skilled nursing facility was 45 min including patient visit and review of past records; includes time spent reviewing records from hospitalization within my organization including labs, imaging reports and provider/consult notes, review of TCU facility records, medication reconciliation, discussion of plan of care with patient, nursing staff and therapy as stated above as well as time spent on documentation.      Electronically signed by:  ANGELO Patel CNP       Sincerely,        ANGELO Patel CNP    Electronically signed

## 2025-07-25 PROCEDURE — P9604 ONE-WAY ALLOW PRORATED TRIP: HCPCS | Performed by: NURSE PRACTITIONER

## 2025-07-25 PROCEDURE — 86481 TB AG RESPONSE T-CELL SUSP: CPT | Performed by: NURSE PRACTITIONER

## 2025-07-25 PROCEDURE — 36415 COLL VENOUS BLD VENIPUNCTURE: CPT | Performed by: NURSE PRACTITIONER

## 2025-07-27 ENCOUNTER — LAB REQUISITION (OUTPATIENT)
Dept: LAB | Facility: CLINIC | Age: 75
End: 2025-07-27

## 2025-07-27 DIAGNOSIS — I95.9 HYPOTENSION, UNSPECIFIED: ICD-10-CM

## 2025-07-27 LAB
GAMMA INTERFERON BACKGROUND BLD IA-ACNC: 0.06 IU/ML
M TB IFN-G BLD-IMP: NEGATIVE
M TB IFN-G CD4+ BCKGRND COR BLD-ACNC: 9.94 IU/ML
MITOGEN IGNF BCKGRD COR BLD-ACNC: 0.01 IU/ML
MITOGEN IGNF BCKGRD COR BLD-ACNC: 0.01 IU/ML
QUANTIFERON MITOGEN: 10 IU/ML
QUANTIFERON NIL TUBE: 0.06 IU/ML
QUANTIFERON TB1 TUBE: 0.07 IU/ML
QUANTIFERON TB2 TUBE: 0.07

## 2025-07-28 LAB
ANION GAP SERPL CALCULATED.3IONS-SCNC: 11 MMOL/L (ref 7–15)
BUN SERPL-MCNC: 22.7 MG/DL (ref 8–23)
CALCIUM SERPL-MCNC: 8.7 MG/DL (ref 8.8–10.4)
CHLORIDE SERPL-SCNC: 96 MMOL/L (ref 98–107)
CREAT SERPL-MCNC: 1.11 MG/DL (ref 0.67–1.17)
EGFRCR SERPLBLD CKD-EPI 2021: 70 ML/MIN/1.73M2
GLUCOSE SERPL-MCNC: 127 MG/DL (ref 70–99)
HCO3 SERPL-SCNC: 29 MMOL/L (ref 22–29)
HGB BLD-MCNC: 11.2 G/DL (ref 13.3–17.7)
MCV RBC AUTO: 93 FL (ref 78–100)
POTASSIUM SERPL-SCNC: 4.4 MMOL/L (ref 3.4–5.3)
SODIUM SERPL-SCNC: 136 MMOL/L (ref 135–145)

## 2025-07-28 PROCEDURE — P9603 ONE-WAY ALLOW PRORATED MILES: HCPCS | Performed by: NURSE PRACTITIONER

## 2025-07-28 PROCEDURE — 82947 ASSAY GLUCOSE BLOOD QUANT: CPT | Performed by: NURSE PRACTITIONER

## 2025-07-28 PROCEDURE — 85018 HEMOGLOBIN: CPT | Performed by: NURSE PRACTITIONER

## 2025-07-28 PROCEDURE — 82310 ASSAY OF CALCIUM: CPT | Performed by: NURSE PRACTITIONER

## 2025-07-28 PROCEDURE — 36415 COLL VENOUS BLD VENIPUNCTURE: CPT | Performed by: NURSE PRACTITIONER

## 2025-07-30 ENCOUNTER — TRANSITIONAL CARE UNIT VISIT (OUTPATIENT)
Dept: GERIATRICS | Facility: CLINIC | Age: 75
End: 2025-07-30
Payer: COMMERCIAL

## 2025-07-30 VITALS
DIASTOLIC BLOOD PRESSURE: 63 MMHG | HEART RATE: 75 BPM | TEMPERATURE: 98 F | RESPIRATION RATE: 18 BRPM | SYSTOLIC BLOOD PRESSURE: 113 MMHG | WEIGHT: 247 LBS | OXYGEN SATURATION: 96 % | BODY MASS INDEX: 31.7 KG/M2 | HEIGHT: 74 IN

## 2025-07-30 DIAGNOSIS — R53.81 PHYSICAL DECONDITIONING: ICD-10-CM

## 2025-07-30 DIAGNOSIS — I50.32 CHRONIC HEART FAILURE WITH PRESERVED EJECTION FRACTION (H): ICD-10-CM

## 2025-07-30 DIAGNOSIS — Z96.642 STATUS POST TOTAL REPLACEMENT OF LEFT HIP: ICD-10-CM

## 2025-07-30 DIAGNOSIS — I10 ESSENTIAL HYPERTENSION: ICD-10-CM

## 2025-07-30 DIAGNOSIS — M79.672 LEFT FOOT PAIN: ICD-10-CM

## 2025-07-30 DIAGNOSIS — L53.9 ERYTHEMA OF FOOT: ICD-10-CM

## 2025-07-30 DIAGNOSIS — I95.9 HYPOTENSION, UNSPECIFIED HYPOTENSION TYPE: ICD-10-CM

## 2025-07-30 DIAGNOSIS — Z79.4 TYPE 2 DIABETES MELLITUS WITH COMPLICATION, WITH LONG-TERM CURRENT USE OF INSULIN (H): ICD-10-CM

## 2025-07-30 DIAGNOSIS — S72.002D CLOSED FRACTURE OF NECK OF LEFT FEMUR WITH ROUTINE HEALING, SUBSEQUENT ENCOUNTER: ICD-10-CM

## 2025-07-30 DIAGNOSIS — E11.8 TYPE 2 DIABETES MELLITUS WITH COMPLICATION, WITH LONG-TERM CURRENT USE OF INSULIN (H): ICD-10-CM

## 2025-07-30 DIAGNOSIS — M14.672 CHARCOT'S JOINT OF LEFT FOOT: ICD-10-CM

## 2025-07-30 DIAGNOSIS — R22.42 LOCALIZED SWELLING OF LEFT FOOT: Primary | ICD-10-CM

## 2025-07-30 RX ORDER — SULFAMETHOXAZOLE AND TRIMETHOPRIM 800; 160 MG/1; MG/1
1 TABLET ORAL 2 TIMES DAILY
Status: SHIPPED
Start: 2025-07-30 | End: 2025-07-31

## 2025-07-30 RX ORDER — OXYCODONE HYDROCHLORIDE 5 MG/1
5-10 TABLET ORAL EVERY 4 HOURS PRN
Qty: 40 TABLET | Refills: 0 | Status: SHIPPED | OUTPATIENT
Start: 2025-07-30

## 2025-07-30 NOTE — PROGRESS NOTES
[facility]ERIATRIC SERVICE  Episodic/Acute/Follow-Up  [place] MRN: [id number]. Place of Service where encounter took place:  [facility] [facility] () [[id number]]   Chief Complaint   Patient presents with    RECHECK    HPI: Reilly Borja  is a 75 year old (1950), who is being seen today for an episodic care visit.    TCU Course:  7/23: TCU Admission. (FVL, post left hip repair but then fell at home recovering, underlying Charcot of left foot).    7/24: Admit visit. Dc'd Tramadol.    Abdirizak seen today on routine follow up as he continues to rehab in TCU.     Today, Abdirizak is upset and worried, because he is having some substantial pain in his left ankle.  He reports new redness on the lateral aspect, pain that is not relieved with oxycodone, some occasional zingers, and increased edema from baseline.  He does have on and off swelling, but previously did not need as many diuretics he says.  He is also noting some low blood pressures, dizziness, but denies any chest pain or palpitations.  He has occasional shortness of breath with exercise, but denies a new cough or fever.  He reports a good appetite, and denies any nausea or heartburn.  His bowels and bladder are working well.  He would like to pursue amputation of his left foot due to the amount of pain and poor quality of life that he has.    Past Medical and Surgical History reviewed in Epic today.  MEDICATIONS:  Current Outpatient Medications   Medication Sig Dispense Refill    acetaminophen (TYLENOL) 500 MG tablet Take 500-1,000 mg by mouth every 6 hours as needed for mild pain.      aspirin (ASA) 325 MG EC tablet Take 1 tablet (325 mg) by mouth daily. 30 tablet 0    carvedilol (COREG) 12.5 MG tablet Take 12.5 mg by mouth 2 times daily (with meals). Hold for SBP <100 or HR <55      carvedilol (COREG) 25 MG tablet Take 25 mg by mouth daily with food.      DULoxetine (CYMBALTA) 30 MG capsule Take 30 mg by mouth daily.      empagliflozin  "(JARDIANCE) 25 MG TABS tablet Take 25 mg by mouth every morning      gabapentin (NEURONTIN) 300 MG capsule Take 600 mg by mouth 2 times daily. 2 capsules (600 mg) morning and afternoon. This is in addition to the bedtime dose of 900 mg.      gabapentin (NEURONTIN) 300 MG capsule Take 900 mg by mouth at bedtime. This is in addition to the morning and afternoon doses.      hydrOXYzine HCl (ATARAX) 25 MG tablet Take 1 tablet (25 mg) by mouth every 6 hours as needed for itching or anxiety (with pain, moderate pain). 30 tablet 0    insulin aspart (NOVOLOG PEN) 100 UNIT/ML pen Inject 10 Units subcutaneously 3 times daily (with meals).      insulin glargine (LANTUS VIAL) 100 UNIT/ML vial Inject 25 Units subcutaneously every evening.      insulin glargine (LANTUS VIAL) 100 UNIT/ML vial Inject 30 Units subcutaneously every morning.      metFORMIN (GLUCOPHAGE) 1000 MG tablet Take 1,000 mg by mouth daily (with breakfast).      oxyCODONE (ROXICODONE) 5 MG tablet Take 1-2 tablets (5-10 mg) by mouth every 4 hours as needed for moderate to severe pain. 40 tablet 0    sacubitril-valsartan (ENTRESTO)  MG per tablet Take 1 tablet by mouth daily.      semaglutide (OZEMPIC, 1 MG/DOSE,) 2 MG/1.5ML pen Inject 1 mg subcutaneously every 7 days. On Sunday      senna-docusate (SENOKOT-S/PERICOLACE) 8.6-50 MG tablet Take 1-2 tablets by mouth 2 times daily as needed for constipation. Take while on oral narcotics to prevent or treat constipation. 30 tablet 0    spironolactone (ALDACTONE) 25 MG tablet Take 25 mg by mouth daily.      torsemide (DEMADEX) 20 MG tablet Take 40 mg by mouth daily.       Objective: /63   Pulse 75   Temp 98  F (36.7  C)   Resp 18   Ht 1.88 m (6' 2\")   Wt 112 kg (247 lb)   SpO2 96%   BMI 31.71 kg/m    BG Trend:    BP Trend:    Exam:  GENERAL APPEARANCE: Alert, in no distress, cooperative.   RESP: Respiratory effort good, no respiratory distress, Lung sounds clear. On RA.   CV: Auscultation of heart " reveals S1, S2, rate and rhythm regular, no murmur, no rub or gallop, Edema 4+ LLE. Peripheral pulses are 2+.  Skin: LLE:    PSYCH: Insight, judgement, and memory are baseline, affect and mood are happy/engaged.    ASSESSMENT/PLAN:  Localized swelling of left foot  Erythema of foot  Left foot pain  Charcot's joint of left foot  Hypotension, unspecified hypotension type  Essential hypertension  Closed fracture of neck of left femur with routine healing, subsequent encounter  Status post total replacement of left hip  Physical deconditioning  Chronic heart failure with preserved ejection fraction (H)  Type 2 diabetes mellitus with complication, with long-term current use of insulin (H)  Acute on chronic. Tenuous.   Provider reviewed records from facility, and interpreted most recent imaging/lab work (LLE US, prior Left foot CT scan, CBC, BMP), and vital signs, each with their own characteristics requiring MDM.  Provider discussed care with nursing, , rehab team, SO/partner Madison, podiatry, and PharmD:  Rehab team reports Bill is walking and WBAT, sometimes struggling to participate secondary to pain.  They are also noting a CPT of 4.8/5.6 and SLUMS of 17/30.   Acting  agent reports patient's next of kin is his partner Madison though they are not  they do reside together.  Given his cognitive impairments, I would be important for and to know that he needs at least daily monitoring if these do not improve with rehab.  Nursing team reports patient has talked about leaving AMA, but they are also noting concerns with hypotension, low blood sugars, new foot redness as described, and they also are requesting an oxycodone refill.  Partner Madison at bedside, and provides some historical context around many surgeries and gave provider direct contact information for ankle surgeon.  Provider [facility] [place], and left a detailed, de-identified message  with direct callback number.  Further discussion with Pharm.D. as noted below.  Charcot + redness/swelling/pain exacerbated:  Noting ultrasound from 7/22 which was clear for any abnormality.  Noting recent course of Keflex.  Given complexity and potential for atypical bug, potential for cellulitis recurrence, provider coordinated care with pharmacist to select agent that would have MDRO coverage.  Noting concern around starting Bactrim with concurrent use of spironolactone and Entresto.  Start Bactrim until ultrasound results help solidify plan.  Closely monitor K level.  Treat pain by scheduling Tylenol and renew Vistaril.  Continuation of PRN order for non-antipsychotic psychotropic medication, is appropriate due to pain and is being reordered today with an end date in 14 days.   Oxycodone refilled.  As mentioned, provider personally reached out to Dr. Marcin Parkinson's office, given there is an osteomyelitis history in the left foot, with recent instrumentation to the left hip due to fracture and new symptoms as above.  Until we hear back, we will continue with current plan.  Obtain venous ultrasound to rule out DVT.  Patient is on prophylactic aspirin post left hip.  Obtain CRP/ESR.  CHF, HTN/Hypotension:  Noting hypotension and occasional dizziness.  Nursing has had to hold diuretics and beta-blockers on and off which can be seen reflected in weights trend.  Decrease Coreg as noted below, and would want to keep diuretics with significant edema.  We note the risk that Jardiance carries with increasing chance of lower limb amputation.  Query cardiology if this should continue.  DMII with fluctuating BG readings:  Mitigate short acting insulin need by increasing Lantus.  Short acting insulin has been held several times, and patient has still been low, reduce scheduled short acting insulin.  Due to complexity and complaints, provider did discuss potential for hospitalization with this patient.  He would like to see  what podiatry says and start evaluation/treatment in-house at this time.  Follow up w/ results, w/in 1 week, or as needed.    Orders:  LLE Venous Ultrasound x1 asap. Dx: LLE swelling.   ESR, CRP x1 on 8/1. Dx: LLE swelling, cellulitis, concern for OM.  RENEW PRN Vistaril x 14 days. Dx: pain.  Tylenol 1000mg PO TID. Dx: pain.  Change PRN Tylenol to 650mg PO Qday PRN. Dx: pain/fever.   Decrease insulin aspart to 6 units subcutaneous TID w/ meals. Dx: DM II.  Increase Lantus to 28 units subcutaneous Qday. Dx: DM II.    Decrease Coreg to 6.25mg PO BID. Dx: CHF/hypotension.   Bactrim DS, 1 tab PO BID x 7 days. Dx: LLE cellulitis.   K level x1 on 8/1 AND 8/4. Dx: risk for hyperkalemia.   Oxycodone refilled.      Electronically signed by:  Dr. Sharda Bautista, APRN CNP DNP

## 2025-07-31 ENCOUNTER — TRANSITIONAL CARE UNIT VISIT (OUTPATIENT)
Dept: GERIATRICS | Facility: CLINIC | Age: 75
End: 2025-07-31
Payer: COMMERCIAL

## 2025-07-31 ENCOUNTER — TELEPHONE (OUTPATIENT)
Dept: GERIATRICS | Facility: CLINIC | Age: 75
End: 2025-07-31

## 2025-07-31 DIAGNOSIS — I82.409 DVT (DEEP VENOUS THROMBOSIS) (H): Primary | ICD-10-CM

## 2025-07-31 RX ORDER — ASPIRIN 81 MG/1
81 TABLET ORAL DAILY
COMMUNITY

## 2025-07-31 RX ORDER — OMEPRAZOLE 20 MG/1
20 TABLET, DELAYED RELEASE ORAL DAILY
COMMUNITY

## 2025-07-31 NOTE — PROGRESS NOTES
[facility] GERIATRICS  NON-FACE TO FACE PROLONGED SERVICE  Reilly Borja 1950  Date of Related Face to Face Service: 7/30/25  INTENT OF SERVICE  This is an extended evaluation of patient's specific problem.  The service relates to a recent or future E/M visit.  Documentation supports medical necessity, relates to ongoing patient management and documents start times and stop times.    Regarding the following diagnoses:  Data Unavailable,   I discussed with [facility].    (Start time 1353 Stop time 1358) = 5 minutes.   I discussed with Dr. Marcin Parkinson, [facility] and [facility] surgeon of the [facility].  (Start time 1401  Stop time 1412) = 11 minutes.   I coordinated care with Lj (nurse manager) @ the nursing facility.    (Start time 1412  Stop time 1430) = 18 minutes.   I discussed with nurse Wallace who is the primary nurse for the patient.    (Start time 1443  Stop time 1445) = 2 minutes.   I discussed this case w/ PharmD who is familiar w/ this patient.    (Start time 1501  Stop time 1505) = 4 minutes.     ASSESSMENT/PLAN  Data Unavailable    ***    {for E/M code 28595 total time must be 31 min or greater and 74 min or less. For E/M code 04406 total time must be 76 min or greater and never code 56065 alone.  Delete instructions in red}      Orders:  Discontinue Bactrim.  Eliquis 10mg PO BID x 7 days, then 5mg PO BID. Dx: LLE DVT.  Decrease Aspirin to 81mg PO Qday. Dx: CAD.   Prilosec 20mg PO Qday. Dx: PPI ppx.   Discontinue CRP/ESR and K scheduled for 8/1 (keep K on 8/4).     TIME  Total time spent with Non-Face to Face prolonged service 40 minutes.     Electronically signed by:  Dr. Sharda Bautista, APRN CNP DNP

## 2025-07-31 NOTE — TELEPHONE ENCOUNTER
"[facility] Triage Lab Review Request    Provider: ANGELO Morton CNP  Facility: [facility]  Facility Type:  TCU    Caller: Cathy  Call Back Number: [phone]    Allergies:  No Known Allergies     Lab Results for Review: US of LLE positive for DVT. Currently on ASA 325mg daily.         Telephone encounter sent to:  ANGELO Kim CNP, DNP    Please send response/orders to \"Geriatrics Nurse Pool\"    Sarah Hussein RN      "

## 2025-07-31 NOTE — TELEPHONE ENCOUNTER
[facility]   2025     Name: Reilly Borja   : 1950     Background:  Please see corresponding NonF2F documentation from today (25) regarding interval hx and decision making.     Orders:  Discontinue Bactrim.  Eliquis 10mg PO BID x 7 days, then 5mg PO BID. Dx: LLE DVT.  Decrease Aspirin to 81mg PO Qday. Dx: CAD.   Prilosec 20mg PO Qday. Dx: PPI ppx.   Discontinue CRP/ESR and K scheduled for  (keep K on ).     Electronically signed by:  Dr. Sharda Bautista, APRN CNP DNP

## 2025-07-31 NOTE — LETTER
7/31/2025      Reilly Borja  [address] [address]        No notes on file      Sincerely,        ANGELO Dumont CNP    Electronically signed

## 2025-08-03 ENCOUNTER — LAB REQUISITION (OUTPATIENT)
Dept: LAB | Facility: CLINIC | Age: 75
End: 2025-08-03

## 2025-08-03 DIAGNOSIS — E87.5 HYPERKALEMIA: ICD-10-CM

## 2025-08-04 LAB — POTASSIUM SERPL-SCNC: 4.5 MMOL/L (ref 3.4–5.3)

## 2025-08-04 PROCEDURE — P9603 ONE-WAY ALLOW PRORATED MILES: HCPCS | Performed by: NURSE PRACTITIONER

## 2025-08-04 PROCEDURE — 36415 COLL VENOUS BLD VENIPUNCTURE: CPT | Performed by: NURSE PRACTITIONER

## 2025-08-04 PROCEDURE — 84132 ASSAY OF SERUM POTASSIUM: CPT | Performed by: NURSE PRACTITIONER

## 2025-08-05 DIAGNOSIS — M14.672 CHARCOT'S JOINT OF LEFT FOOT: ICD-10-CM

## 2025-08-05 DIAGNOSIS — R22.42 LOCALIZED SWELLING OF LEFT FOOT: ICD-10-CM

## 2025-08-05 RX ORDER — OXYCODONE HYDROCHLORIDE 5 MG/1
5-10 TABLET ORAL EVERY 4 HOURS PRN
Qty: 36 TABLET | Refills: 0 | Status: SHIPPED | OUTPATIENT
Start: 2025-08-05

## 2025-08-06 ENCOUNTER — DISCHARGE SUMMARY NURSING HOME (OUTPATIENT)
Dept: GERIATRICS | Facility: CLINIC | Age: 75
End: 2025-08-06
Payer: COMMERCIAL

## 2025-08-06 VITALS
SYSTOLIC BLOOD PRESSURE: 116 MMHG | WEIGHT: 240 LBS | HEART RATE: 72 BPM | TEMPERATURE: 98.2 F | DIASTOLIC BLOOD PRESSURE: 57 MMHG | OXYGEN SATURATION: 92 % | RESPIRATION RATE: 18 BRPM | BODY MASS INDEX: 30.81 KG/M2

## 2025-08-06 DIAGNOSIS — Z96.642 STATUS POST TOTAL REPLACEMENT OF LEFT HIP: ICD-10-CM

## 2025-08-06 DIAGNOSIS — M14.672 CHARCOT'S JOINT OF LEFT FOOT: Primary | ICD-10-CM

## 2025-08-06 DIAGNOSIS — Z79.4 TYPE 2 DIABETES MELLITUS WITH COMPLICATION, WITH LONG-TERM CURRENT USE OF INSULIN (H): ICD-10-CM

## 2025-08-06 DIAGNOSIS — R53.81 PHYSICAL DECONDITIONING: ICD-10-CM

## 2025-08-06 DIAGNOSIS — I82.412 ACUTE DEEP VEIN THROMBOSIS (DVT) OF FEMORAL VEIN OF LEFT LOWER EXTREMITY (H): ICD-10-CM

## 2025-08-06 DIAGNOSIS — E11.8 TYPE 2 DIABETES MELLITUS WITH COMPLICATION, WITH LONG-TERM CURRENT USE OF INSULIN (H): ICD-10-CM

## 2025-08-06 DIAGNOSIS — I10 ESSENTIAL HYPERTENSION: ICD-10-CM

## 2025-08-06 DIAGNOSIS — M79.672 LEFT FOOT PAIN: ICD-10-CM

## 2025-08-06 DIAGNOSIS — I50.32 CHRONIC HEART FAILURE WITH PRESERVED EJECTION FRACTION (H): ICD-10-CM

## 2025-08-06 PROCEDURE — 99316 NF DSCHRG MGMT 30 MIN+: CPT | Performed by: NURSE PRACTITIONER

## (undated) DEVICE — DRESSING XEROFORM PETROLATUM 5X9 33605

## (undated) DEVICE — PREP CHLORAPREP 26ML TINTED ORANGE  260815

## (undated) DEVICE — PACK LOWER EXTREMITY RIVERSIDE SOP32LEFSX

## (undated) DEVICE — GLOVE BIOGEL PI MICRO INDICATOR UNDERGLOVE SZ 6.5 48965

## (undated) DEVICE — PACK HIP LAKES WITH POUCH

## (undated) DEVICE — DRSG ADAPTIC 3X8" 6113

## (undated) DEVICE — SUCTION MANIFOLD NEPTUNE 2 SYS 4 PORT 0702-020-000

## (undated) DEVICE — CUSTOM PACK LOWER EXTREMITY SOP5BLEHEA

## (undated) DEVICE — GLOVE UNDER INDICATOR PI SZ 7.0 LF 41670

## (undated) DEVICE — GLOVE BIOGEL PI MICRO INDICATOR UNDERGLOVE SZ 8.0 48980

## (undated) DEVICE — GOWN LG DISP 9515

## (undated) DEVICE — CAST PADDING 4" UNSTERILE 9044

## (undated) DEVICE — CAST PLASTER SPLINT 5X30" 7395

## (undated) DEVICE — DRILL BIT QUICK CONNECT ZIM 2.5X110MM

## (undated) DEVICE — SOL NACL 0.9% IRRIG 3000ML BAG 07972-08

## (undated) DEVICE — GLOVE BIOGEL PI MICRO SZ 8.0 48580

## (undated) DEVICE — GEL ULTRASOUND AQUASONIC 20GM 01-01

## (undated) DEVICE — DRSG XEROFORM 1X8"

## (undated) DEVICE — TRAY PREP DRY SKIN SCRUB 067

## (undated) DEVICE — DRSG JUMPSTART ANTIMICROBIAL 1.5X8" ABS-4005

## (undated) DEVICE — GLOVE BIOGEL PI INDICATOR 8.0 LF 41680

## (undated) DEVICE — GOWN XLG DISP 9545

## (undated) DEVICE — LINEN ORTHO PACK 5446

## (undated) DEVICE — PADDING CAST 4IN WEBRIL STRL 2502

## (undated) DEVICE — BLADE KNIFE SURG 10 371110

## (undated) DEVICE — BANDAGE ELASTIC VELCRO 4IN REB3014

## (undated) DEVICE — Device

## (undated) DEVICE — TOURNIQUET SGL  BLADDER 30"X4" BLUE 5921030135

## (undated) DEVICE — SU PDO 1 STRATAFIX 36X36CM CTX TAPERPOINT SXPD2B405

## (undated) DEVICE — BANDAGE ELASTIC 4X550 LF DBL 593-94LF

## (undated) DEVICE — ESU PENCIL SMOKE EVAC W/ROCKER SWITCH 0703-047-000

## (undated) DEVICE — IMPLANTABLE DEVICE: Type: IMPLANTABLE DEVICE | Site: ANKLE | Status: NON-FUNCTIONAL

## (undated) DEVICE — SOL WATER IRRIG 1000ML BOTTLE 2F7114

## (undated) DEVICE — STRAP POSITIONING 60X31" BODY KNEE KBS 01

## (undated) DEVICE — SU STRATAFIX MONOCRYL 3-0 SPIRAL PS-2 30CM SXMP1B106

## (undated) DEVICE — GRAFT BONE INFUSE BMP SM 7510200: Type: IMPLANTABLE DEVICE | Site: ANKLE | Status: NON-FUNCTIONAL

## (undated) DEVICE — SUCTION CANISTER MEDIVAC LINER 3000ML W/LID 65651-530

## (undated) DEVICE — GLOVE BIOGEL PI ULTRATOUCH G SZ 7.5 42175

## (undated) DEVICE — PREP SCRUB SOL EXIDINE 4% CHG 4OZ 29002-404

## (undated) DEVICE — SUTURE ABSORBABLE VICRYL CT-B1 L36 IN BRAID VIOLET JB947H

## (undated) DEVICE — GLOVE BIOGEL PI SZ 6.5 40865

## (undated) DEVICE — SU VICRYL 2-0 CT-2 27" UND J269H

## (undated) DEVICE — SU ETHILON 3-0 PS-1 18" 1663G

## (undated) DEVICE — DRSG GAUZE 4X8" NON21842

## (undated) DEVICE — SPONGE RAY-TEC 4X8" 7318

## (undated) DEVICE — SYR 30ML LL W/O NDL 302832

## (undated) DEVICE — BLADE SAW SAGITTAL STRK 18X90X1.27MM HD SYS 6 6118-127-090

## (undated) DEVICE — SU ETHIBOND 5 V-37 4X30" MB66G

## (undated) DEVICE — SUCTION IRR SYSTEM W/TIP INTERPULSE

## (undated) DEVICE — GLOVE UNDER INDICATOR PI SZ 6.5 LF 41665

## (undated) DEVICE — GLOVE BIOGEL PI MICRO SZ 6.5 48565

## (undated) DEVICE — DRAPE C-ARM 60X42" 1013

## (undated) DEVICE — BNDG ELASTIC 4" DBL LENGTH UNSTERILE 6611-14

## (undated) DEVICE — SOL WATER IRRIG 1000ML BOTTLE 07139-09

## (undated) DEVICE — DRSG GAUZE 4X4" TRAY 6939

## (undated) DEVICE — DRSG ABDOMINAL 07 1/2X8" 7197D

## (undated) DEVICE — SU MONOCRYL 2-0 CT-1 36" UND Y945H

## (undated) DEVICE — DRAPE C-ARM MINI 54X85" 07-CA600

## (undated) DEVICE — DRSG ABD TNDRSRB WET PRUF 8IN X 10IN STRL  9194A

## (undated) DEVICE — DRSG COTTON ROLL DEROYAL STERILE 9866-01

## (undated) DEVICE — ESU GROUND PAD ADULT W/CORD E7507

## (undated) DEVICE — DRAPE C-ARMOR 5 SIDED 5523

## (undated) DEVICE — SOL NACL 0.9% IRRIG 1000ML BOTTLE 2F7124

## (undated) DEVICE — TOURNIQUET CUFF 30" REPRO BLUE 60-7070-105

## (undated) DEVICE — BONE CLEANING TIP INTERPULSE  0210-010-000

## (undated) DEVICE — GOWN IMPERVIOUS SPECIALTY XLG/XLONG 32474

## (undated) DEVICE — SU VICRYL 3-0 CT-2 27" UND J232H

## (undated) DEVICE — SUTURE VICRYL+ 2-0 CT-2 27" UND VCP269H

## (undated) DEVICE — PLATE GROUNDING ADULT W/CORD 9165L

## (undated) DEVICE — SYR 20ML LL W/O NDL

## (undated) DEVICE — STRAP KNEE/BODY 31143004

## (undated) DEVICE — SU ETHILON 3-0 PS-1 18" 1663H

## (undated) DEVICE — DRSG AQUACEL AG 3.5X9.75" HYDROFIBER 412011

## (undated) DEVICE — IMM LEG ELEVATOR 79-90191

## (undated) DEVICE — SUCTION MANIFOLD NEPTUNE 2 SYS 1 PORT 702-025-000

## (undated) DEVICE — ALCOHOL ISOPROPYL 4 OZ 70% IA7004

## (undated) DEVICE — SOLIDIFIER FLD 3.2OZ  CL-FREE F/ BIOHZRD WASTE 3000ML CANIST

## (undated) DEVICE — NDL 18GA 1.5" 305196

## (undated) DEVICE — DECANTER VIAL 2006S

## (undated) RX ORDER — ONDANSETRON 2 MG/ML
INJECTION INTRAMUSCULAR; INTRAVENOUS
Status: DISPENSED
Start: 2025-07-18

## (undated) RX ORDER — BUPIVACAINE HYDROCHLORIDE 5 MG/ML
INJECTION, SOLUTION EPIDURAL; INTRACAUDAL; PERINEURAL
Status: DISPENSED
Start: 2025-07-18

## (undated) RX ORDER — LIDOCAINE HYDROCHLORIDE 10 MG/ML
INJECTION, SOLUTION EPIDURAL; INFILTRATION; INTRACAUDAL; PERINEURAL
Status: DISPENSED
Start: 2022-09-27

## (undated) RX ORDER — FENTANYL CITRATE 50 UG/ML
INJECTION, SOLUTION INTRAMUSCULAR; INTRAVENOUS
Status: DISPENSED
Start: 2025-07-18

## (undated) RX ORDER — HYDROMORPHONE HYDROCHLORIDE 1 MG/ML
INJECTION, SOLUTION INTRAMUSCULAR; INTRAVENOUS; SUBCUTANEOUS
Status: DISPENSED
Start: 2025-07-18

## (undated) RX ORDER — BUPIVACAINE HYDROCHLORIDE 2.5 MG/ML
INJECTION, SOLUTION EPIDURAL; INFILTRATION; INTRACAUDAL
Status: DISPENSED
Start: 2022-09-27

## (undated) RX ORDER — FENTANYL CITRATE 50 UG/ML
INJECTION, SOLUTION INTRAMUSCULAR; INTRAVENOUS
Status: DISPENSED
Start: 2023-09-13

## (undated) RX ORDER — CEFAZOLIN SODIUM/WATER 2 G/20 ML
SYRINGE (ML) INTRAVENOUS
Status: DISPENSED
Start: 2025-07-18

## (undated) RX ORDER — PROPOFOL 10 MG/ML
INJECTION, EMULSION INTRAVENOUS
Status: DISPENSED
Start: 2025-07-18

## (undated) RX ORDER — FENTANYL CITRATE-0.9 % NACL/PF 10 MCG/ML
PLASTIC BAG, INJECTION (ML) INTRAVENOUS
Status: DISPENSED
Start: 2023-09-13

## (undated) RX ORDER — CEFAZOLIN SODIUM/WATER 2 G/20 ML
SYRINGE (ML) INTRAVENOUS
Status: DISPENSED
Start: 2023-09-13

## (undated) RX ORDER — GLYCOPYRROLATE 0.2 MG/ML
INJECTION, SOLUTION INTRAMUSCULAR; INTRAVENOUS
Status: DISPENSED
Start: 2023-09-13

## (undated) RX ORDER — BUPIVACAINE HYDROCHLORIDE AND EPINEPHRINE 5; 5 MG/ML; UG/ML
INJECTION, SOLUTION EPIDURAL; INTRACAUDAL; PERINEURAL
Status: DISPENSED
Start: 2025-07-18

## (undated) RX ORDER — DEXAMETHASONE SODIUM PHOSPHATE 4 MG/ML
INJECTION, SOLUTION INTRA-ARTICULAR; INTRALESIONAL; INTRAMUSCULAR; INTRAVENOUS; SOFT TISSUE
Status: DISPENSED
Start: 2025-07-18

## (undated) RX ORDER — HEPARIN SODIUM 1000 [USP'U]/ML
INJECTION, SOLUTION INTRAVENOUS; SUBCUTANEOUS
Status: DISPENSED
Start: 2021-12-20

## (undated) RX ORDER — DOBUTAMINE HYDROCHLORIDE 200 MG/100ML
INJECTION INTRAVENOUS
Status: DISPENSED
Start: 2023-09-08

## (undated) RX ORDER — METOPROLOL TARTRATE 1 MG/ML
INJECTION, SOLUTION INTRAVENOUS
Status: DISPENSED
Start: 2023-09-08

## (undated) RX ORDER — DEXMEDETOMIDINE HYDROCHLORIDE 100 UG/ML
INJECTION, SOLUTION INTRAVENOUS
Status: DISPENSED
Start: 2025-07-18

## (undated) RX ORDER — ONDANSETRON 2 MG/ML
INJECTION INTRAMUSCULAR; INTRAVENOUS
Status: DISPENSED
Start: 2023-09-13

## (undated) RX ORDER — ATROPINE SULFATE 0.4 MG/ML
AMPUL (ML) INJECTION
Status: DISPENSED
Start: 2023-09-08

## (undated) RX ORDER — SEVOFLURANE 250 ML/250ML
LIQUID RESPIRATORY (INHALATION)
Status: DISPENSED
Start: 2025-07-18

## (undated) RX ORDER — OXYCODONE HYDROCHLORIDE 5 MG/1
TABLET ORAL
Status: DISPENSED
Start: 2025-07-18